# Patient Record
Sex: FEMALE | Race: BLACK OR AFRICAN AMERICAN | NOT HISPANIC OR LATINO | Employment: UNEMPLOYED | URBAN - NONMETROPOLITAN AREA
[De-identification: names, ages, dates, MRNs, and addresses within clinical notes are randomized per-mention and may not be internally consistent; named-entity substitution may affect disease eponyms.]

---

## 2019-03-28 DIAGNOSIS — M17.12 PRIMARY OSTEOARTHRITIS OF LEFT KNEE: Primary | ICD-10-CM

## 2019-11-06 PROBLEM — M17.12 PRIMARY OSTEOARTHRITIS OF LEFT KNEE: Status: ACTIVE | Noted: 2019-11-06

## 2024-01-16 ENCOUNTER — HOSPITAL ENCOUNTER (INPATIENT)
Facility: HOSPITAL | Age: 44
LOS: 10 days | Discharge: HOME | DRG: 561 | End: 2024-01-26
Attending: INTERNAL MEDICINE | Admitting: ORTHOPAEDIC SURGERY
Payer: MEDICAID

## 2024-01-16 DIAGNOSIS — Z96.659 S/P TOTAL KNEE ARTHROPLASTY: ICD-10-CM

## 2024-01-16 DIAGNOSIS — E66.9 OBESITY (BMI 30-39.9): Primary | ICD-10-CM

## 2024-01-16 DIAGNOSIS — S78.119A UNILATERAL AKA: ICD-10-CM

## 2024-01-16 DIAGNOSIS — M17.12 OSTEOARTHRITIS OF LEFT KNEE: ICD-10-CM

## 2024-01-16 DIAGNOSIS — M17.12 PRIMARY OSTEOARTHRITIS OF LEFT KNEE: ICD-10-CM

## 2024-01-16 PROCEDURE — 25000003 PHARM REV CODE 250: Performed by: STUDENT IN AN ORGANIZED HEALTH CARE EDUCATION/TRAINING PROGRAM

## 2024-01-16 PROCEDURE — 11800000 HC REHAB PRIVATE ROOM

## 2024-01-16 PROCEDURE — 97167 OT EVAL HIGH COMPLEX 60 MIN: CPT

## 2024-01-16 RX ORDER — GABAPENTIN 300 MG/1
300 CAPSULE ORAL 3 TIMES DAILY
Status: DISCONTINUED | OUTPATIENT
Start: 2024-01-16 | End: 2024-01-26 | Stop reason: HOSPADM

## 2024-01-16 RX ORDER — METHOCARBAMOL 750 MG/1
750 TABLET, FILM COATED ORAL 3 TIMES DAILY
Status: DISCONTINUED | OUTPATIENT
Start: 2024-01-16 | End: 2024-01-17

## 2024-01-16 RX ORDER — FAMOTIDINE 20 MG/1
20 TABLET, FILM COATED ORAL 2 TIMES DAILY
Status: DISCONTINUED | OUTPATIENT
Start: 2024-01-16 | End: 2024-01-26 | Stop reason: HOSPADM

## 2024-01-16 RX ORDER — BISACODYL 10 MG/1
10 SUPPOSITORY RECTAL DAILY PRN
Status: DISCONTINUED | OUTPATIENT
Start: 2024-01-16 | End: 2024-01-26 | Stop reason: HOSPADM

## 2024-01-16 RX ORDER — ACETAMINOPHEN 500 MG
1000 TABLET ORAL EVERY 8 HOURS
Status: DISCONTINUED | OUTPATIENT
Start: 2024-01-16 | End: 2024-01-17

## 2024-01-16 RX ORDER — POLYETHYLENE GLYCOL 3350 17 G/17G
17 POWDER, FOR SOLUTION ORAL DAILY
Status: DISCONTINUED | OUTPATIENT
Start: 2024-01-17 | End: 2024-01-26 | Stop reason: HOSPADM

## 2024-01-16 RX ORDER — SODIUM CHLORIDE 0.9 % (FLUSH) 0.9 %
10 SYRINGE (ML) INJECTION
Status: DISCONTINUED | OUTPATIENT
Start: 2024-01-16 | End: 2024-01-26 | Stop reason: HOSPADM

## 2024-01-16 RX ORDER — ONDANSETRON 4 MG/1
8 TABLET, ORALLY DISINTEGRATING ORAL EVERY 8 HOURS PRN
Status: DISCONTINUED | OUTPATIENT
Start: 2024-01-16 | End: 2024-01-26 | Stop reason: HOSPADM

## 2024-01-16 RX ORDER — ZIPRASIDONE HYDROCHLORIDE 40 MG/1
40 CAPSULE ORAL NIGHTLY
Status: DISCONTINUED | OUTPATIENT
Start: 2024-01-16 | End: 2024-01-26 | Stop reason: HOSPADM

## 2024-01-16 RX ORDER — IBUPROFEN 400 MG/1
800 TABLET ORAL EVERY 6 HOURS
Status: DISCONTINUED | OUTPATIENT
Start: 2024-01-16 | End: 2024-01-17

## 2024-01-16 RX ORDER — OXYCODONE HYDROCHLORIDE 10 MG/1
10 TABLET ORAL EVERY 4 HOURS PRN
Status: DISCONTINUED | OUTPATIENT
Start: 2024-01-16 | End: 2024-01-26 | Stop reason: HOSPADM

## 2024-01-16 RX ORDER — AMOXICILLIN 250 MG
1 CAPSULE ORAL 2 TIMES DAILY
Status: DISCONTINUED | OUTPATIENT
Start: 2024-01-16 | End: 2024-01-18

## 2024-01-16 RX ORDER — OXYCODONE HYDROCHLORIDE 5 MG/1
5 TABLET ORAL EVERY 4 HOURS PRN
Status: DISCONTINUED | OUTPATIENT
Start: 2024-01-16 | End: 2024-01-26 | Stop reason: HOSPADM

## 2024-01-16 RX ADMIN — IBUPROFEN 800 MG: 400 TABLET, FILM COATED ORAL at 11:01

## 2024-01-16 RX ADMIN — METHOCARBAMOL TABLETS 750 MG: 750 TABLET, COATED ORAL at 09:01

## 2024-01-16 RX ADMIN — GABAPENTIN 300 MG: 300 CAPSULE ORAL at 03:01

## 2024-01-16 RX ADMIN — ZIPRASIDONE HYDROCHLORIDE 40 MG: 40 CAPSULE ORAL at 09:01

## 2024-01-16 RX ADMIN — ACETAMINOPHEN 1000 MG: 500 TABLET ORAL at 03:01

## 2024-01-16 RX ADMIN — ACETAMINOPHEN 1000 MG: 500 TABLET ORAL at 09:01

## 2024-01-16 RX ADMIN — METHOCARBAMOL TABLETS 750 MG: 750 TABLET, COATED ORAL at 03:01

## 2024-01-16 RX ADMIN — IBUPROFEN 800 MG: 400 TABLET, FILM COATED ORAL at 05:01

## 2024-01-16 RX ADMIN — RIVAROXABAN 20 MG: 10 TABLET, FILM COATED ORAL at 05:01

## 2024-01-16 RX ADMIN — GABAPENTIN 300 MG: 300 CAPSULE ORAL at 09:01

## 2024-01-16 RX ADMIN — SENNOSIDES AND DOCUSATE SODIUM 1 TABLET: 8.6; 5 TABLET ORAL at 09:01

## 2024-01-16 RX ADMIN — FAMOTIDINE 20 MG: 20 TABLET, FILM COATED ORAL at 09:01

## 2024-01-16 NOTE — PLAN OF CARE
Problem: Rehabilitation (IRF) Plan of Care  Goal: Plan of Care Review  Outcome: Ongoing, Progressing  Goal: Patient-Specific Goal (Individualized)  Outcome: Ongoing, Progressing  Goal: Absence of New-Onset Illness or Injury  Outcome: Ongoing, Progressing  Goal: Optimal Comfort and Wellbeing  Outcome: Ongoing, Progressing  Goal: Readiness for Transition of Care  Outcome: Ongoing, Progressing     Problem: Bariatric Environmental Safety  Goal: Safety Maintained with Care  Outcome: Ongoing, Progressing

## 2024-01-16 NOTE — PLAN OF CARE
Problem: Occupational Therapy  Goal: Occupational Therapy Goal  Description:  Long Term Goals to be met by: 01/30/24     Patient will increase functional independence with ADLs by performing:    Feeding with [place].  UE Dressing with Modified [place].  LE Dressing with Supervision.  Grooming while seated at sink with Modified [place].  Toileting from toilet with Set-up Assistance for hygiene and clothing management.   Bathing from  shower chair/bench with Set-up Assistance.  Toilet transfer to toilet with Set-up Assistance.  Increased functional strength to 4+/5 to 5/5 for bilateral UE's.    Outcome: Established OT POC

## 2024-01-16 NOTE — PLAN OF CARE
[facility] (Hospital)  Initial Discharge Assessment       Primary Care Provider: No, Primary Doctor    Admission Diagnosis: Unilateral AKA [S78.119A]  S/P total knee arthroplasty [Z96.659]  Osteoarthritis of left knee [M17.12]    Admission Date: 1/16/2024  Expected Discharge Date:     Transition of Care Barriers: Mobility    Payor: MEDICAID / Plan: [employer] [facility] ([employer]) / Product Type: Managed Medicaid /     Extended Emergency Contact Information  Primary Emergency Contact: Liliane Santizo  Address:             [place] [address] [facility]  Home Phone: [phone]  Mobile Phone: [phone]  Relation: Mother    Discharge Plan A: Home with family  Discharge Plan B: Home with family      [facility] [place] [address]  [address]  [place] [address]  Phone: [phone] Fax: [phone]      Initial Assessment (most recent)       Adult Discharge Assessment - 01/16/24 1505          Discharge Assessment    Assessment Type Discharge Planning Assessment     Confirmed/corrected address, phone number and insurance Yes     Confirmed Demographics Correct on Facesheet     Source of Information patient     If unable to respond/provide information was family/caregiver contacted? Yes     Contact Name/Number Liliane Santizo [phone]     Communicated FRED with patient/caregiver Yes     Reason For Admission IPR     People in Home parent(s)     Facility Arrived From: [facility]     Do you expect to return to your current living situation? Yes     Do you have help at home or someone to help you manage your care at home? Yes     Who are your caregiver(s) and their phone number(s)? Liliane Santizo [phone]     Prior to hospitilization cognitive status: Alert/Oriented   Patient is deaf and difficult w/communication    Current cognitive status: Alert/Oriented     Walking or Climbing Stairs Difficulty yes     Walking  or Climbing Stairs ambulation difficulty, requires equipment     Mobility Management Requires rollator or WC     Dressing/Bathing Difficulty yes     Dressing/Bathing bathing difficulty, assistance 1 person     Dressing/Bathing Management Mother assists w/getting in and out of shower     Home Accessibility stairs to enter home     Number of Stairs, Main Entrance three     Home Layout Able to live on 1st floor     Equipment Currently Used at Home wheelchair;rollator     Readmission within 30 days? No     Patient currently being followed by outpatient case management? No     Do you currently have service(s) that help you manage your care at home? No     Do you take prescription medications? Yes     Do you have prescription coverage? Yes     Coverage [email or web address]     Do you have any problems affording any of your prescribed medications? No     Is the patient taking medications as prescribed? yes     Who is going to help you get home at discharge? Liliane Santizo [phone]     How do you get to doctors appointments? family or friend will provide     Are you on dialysis? No     Do you take coumadin? No     Discharge Plan A Home with family     Discharge Plan B Home with family     DME Needed Upon Discharge  other (see comments)   TBD    Discharge Plan discussed with: Parent(s)     Name(s) and Number(s) Liliane Santizo [phone]     Transition of Care Barriers Mobility        Physical Activity    On average, how many days per week do you engage in moderate to strenuous exercise (like a brisk walk)? 0 days     On average, how many minutes do you engage in exercise at this level? 0 min        Financial Resource Strain    How hard is it for you to pay for the very basics like food, housing, medical care, and heating? Not hard at all        Housing Stability    In the last 12 months, was there a time when you were not able to pay the mortgage or rent on time? No     In the last 12 months, was there a time when you  did not have a steady place to sleep or slept in a shelter (including now)? No        Transportation Needs    In the past 12 months, has lack of transportation kept you from medical appointments or from getting medications? No     In the past 12 months, has lack of transportation kept you from meetings, work, or from getting things needed for daily living? No        Food Insecurity    Within the past 12 months, you worried that your food would run out before you got the money to buy more. Never true     Within the past 12 months, the food you bought just didn't last and you didn't have money to get more. Never true        Stress    Do you feel stress - tense, restless, nervous, or anxious, or unable to sleep at night because your mind is troubled all the time - these days? Only a little        Social Connections    In a typical week, how many times do you talk on the phone with family, friends, or neighbors? More than three times a week     How often do you get together with friends or relatives? More than three times a week     Do you belong to any clubs or organizations such as Sikhism groups, unions, fraternal or athletic groups, or school groups? No     Are you , , , , never , or living with a partner? Never         Alcohol Use    Q1: How often do you have a drink containing alcohol? Never     Q2: How many drinks containing alcohol do you have on a typical day when you are drinking? Patient does not drink     Q3: How often do you have six or more drinks on one occasion? Never        OTHER    Name(s) of People in Home Liliane Santizo [phone]                   DCP completed. No concerns noted at this time. CM to remain available for any needs.

## 2024-01-16 NOTE — PT/OT/SLP EVAL
Occupational Therapy Inpatient Rehab Evaluation    Name: Lupe Santizo  MRN: [id number]    Recommendations:     Discharge Recommendations:  Low Intensity Therapy   Discharge Equipment Recommendations: walker, rolling, bedside commode, bath bench   Barriers to discharge:  Medical and functional status    Assessment:  Lupe Santizo is a 43 y.o. female admitted with a medical diagnosis of <principal problem not specified>.  She presents with the following impairments/functional limitations:  weakness, impaired endurance, impaired self care skills, impaired functional mobility, gait instability, impaired balance, visual deficits, impaired cognition, decreased coordination, decreased upper extremity function, decreased lower extremity function, decreased safety awareness, pain, decreased ROM, impaired fine motor, impaired skin, edema, impaired cardiopulmonary response to activity, impaired joint extensibility, impaired muscle length .    General Precautions: Standard, fall, deaf (Communicates utilizing board)     Orthopedic Precautions:LLE weight bearing as tolerated     Braces: N/A    Rehab Prognosis: Good; patient would benefit from acute skilled OT services to address these deficits and reach maximum level of function.      History:     Past Medical History:   Diagnosis Date    Deaf     History of DVT (deep vein thrombosis)     Primary osteoarthritis of left knee        Past Surgical History:   Procedure Laterality Date    CHOLECYSTECTOMY      KNEE SURGERY      TOTAL KNEE ARTHROPLASTY Left 1/11/2024    Procedure: ARTHROPLASTY, KNEE, TOTAL;  Surgeon: Phoenix Godoy MD;  Location: [facility];  Service: Orthopedics;  Laterality: Left;       Subjective     Orientation: Oriented x4    Chief Complaint: weakness and pain    Patient/Family Comments/goals: Pt would like to improve her overall independence with ADL's including functional mobility.     Vitals  Vitals at Rest  BP     HR     O2 Sat     Pain Pain Rating 1:  10/10  Location - Side 1: Left  Location 1: knee  Pain Addressed 1: Reposition, Distraction, Cessation of Activity, Nurse notified  Pain Rating Post-Intervention 1: 6/10     Vitals With Activity  BP     HR     O2 Sat     Pain Pain Rating 1: 10/10  Location - Side 1: Left  Location 1: knee  Pain Addressed 1: Reposition, Distraction, Cessation of Activity, Nurse notified  Pain Rating Post-Intervention 1: 6/10     Respiratory Status: Room air    Patients cultural, spiritual, Druze conflicts given the current situation: no       Living Environment   Living Environment  People in Home: parent(s) (Pt lives with her mom.)  Name(s) of People in Home: Liliane Santizo [phone]  Living Arrangements: house  Home Accessibility: stairs to enter home (3 steps)  Number of Stairs, Main Entrance: three  Stair Railings at Home: none  Home Layout: Able to live on 1st floor  Transportation Anticipated: family or friend will provide  Equipment Currently Used at Home: wheelchair, rollator  Shower Setup: Tub/Shower combo    Prior Level of Function  BADL: Supervision or Set-up Assistance    IADL: Activity did not occur    Equipment used at home: wheelchair, rollator.  DME owned (not currently used): none.      Upon discharge, patient will have assistance from mother.    Objective:     Patient found HOB elevated with    upon OT entry to room.    Mobility   Patient completed:  Rolling/Turning to Left with contact guard assistance  Rolling/Turning to Right with contact guard assistance  Supine to Sit with contact guard assistance  Sit to Stand Transfer with moderate assistance with rolling walker  Bed to Chair Transfer using Step Transfer technique with moderate assistance with rolling walker  Toilet Transfer Step Transfer technique with moderate assistance with  grab bars  Shower Transfer Step Transfer technique with moderate assistance with grab bars and TTB.    Functional Mobility   Pt ambulated 17' between surfaces requiring  steadying assist utilizing RW.     ADLs     Current Status   Eating 5   Oral Hygiene 4   Shower, Bathe Self 2   Upper Body Dressing 3   Lower Body Dressing 2   Toileting Hygiene 2   Toilet Transfer 3   Putting On, Taking Off Footwear 1     Limiting Factors for ADLs: endurance, limited ROM, balance, weakness, body habitus, coordination, cognition, safety awareness, and pain    Exams     ROM: -       WFL    Hand Dominance: Left    ROM Hand  Left Hand: WFL  Right Hand: WFL    Strength  Overall Strength: -       Deficits, Left Upper    Strength Comment   Shoulder Flexion  4-/5    Shoulder Extension 4-/5    Shoulder Abduction  4-/5    Shoulder Adduction 4/5    Shoulder External Rotation 4-/5    Shoulder Internal Rotation 4-/5    Elbow Flexion 4/5    Elbow Extension  4/5    Forearm Pronation 4/5    Forearm Supination 4/5    Wrist Flexion 4/5    Wrist Extension 4/5    MP Flexion     MP Extension     Thumb Flexion     Thumb Extension       -       Deficits, Right Upper    Strength Comment   Shoulder Flexion  4-/5    Shoulder Extension 4-/5    Shoulder Abduction  4-/5    Shoulder Adduction 4/5    Shoulder External Rotation 4-/5    Shoulder Internal Rotation 4-/5    Elbow Flexion 4/5    Elbow Extension  4/5    Forearm Pronation 4/5    Forearm Supination 4/5    Wrist Flexion 4/5    Wrist Extension 4/5    MP Flexion     MP Extension     Thumb Flexion     Thumb Extension          Strength:   Left   Left  Strength Trial 1 Trial 2 Trail 3    Strength 45 38 38       Right   Right  Strength Trial 1 Trial 2 Trail 3    Strength 38 36 37       Sensation  Left: -       WNL  Right: -       WNL    Coordination:   -       Impaired  Right hand, finger to nose  , Right hand, manipulation of objects  , and Fine motor coordination tests 9-Hole Peg Test    Tone  Left: WNL  Right: WNL    Visual/Perceptual  Impaired  acuity    Cognition:   Deficits, STM    Balance    Sitting  Sitting Surface: EOB Unsupported  Static: No UE  Support, Good (-)  Dynamic: No UE extremity support, Fair (+)    Standing  Static: Bilateral UE Extremity Support, Fair (-)  Dynamic: Bilateral UE extremity support, Poor (+)    Righting Reaction:   Deficits     Comment   Left Protective Reactions Present    Right Protective Reactions Present    Forward Protective Reactions Present    Backward Protective Reactions Present    Left Rightign Reactions Delayed    Right Righting Reactions Delayed    Forward Righting Reactions Delayed    Backward Righting Reactions Delayed        Posture/Deviations  Rounded Shoulders    Additional Treatments   9-Hole Peg Test: (L) 34 seconds; (R) 48 seconds    LifeStyle Change and Education     Patient left up in chair with  nurse notified.    Education provided: Roles and goals of OT, ADLs, transfer training, bed mobility, body mechanics, assistive device, wheelchair precautions, safety precautions, fall prevention, post-op precautions, equipment recommendations, and home safety    Short Term Goals to be met by: 01/23/24     Patient will increase functional independence with ADLs by performing:    UE Dressing with Supervision.  LE Dressing with Moderate Assistance.  Grooming while seated at sink with Set-up Assistance.  Toileting from toilet with Minimal Assistance for hygiene and clothing management.   Bathing from  shower chair/bench with Minimal Assistance.  Toilet transfer to toilet with Minimal Assistance.  Increased functional strength to 4/5 for bilateral UE's.     Long Term Goals to be met by: 01/30/24     Patient will increase functional independence with ADLs by performing:    Feeding with [place].  UE Dressing with Modified [place].  LE Dressing with Supervision.  Grooming while seated at sink with Modified [place].  Toileting from toilet with Set-up Assistance for hygiene and clothing management.   Bathing from  shower chair/bench with Set-up Assistance.  Toilet transfer to toilet with Set-up  Assistance.  Increased functional strength to 4+/5 to 5/5 for bilateral UE's.      Plan     During this hospitalization, patient to be seen 5 x/week (for 90 min per day, for 14 days) to address the identified rehab impairments via self-care/home management, community/work re-entry, therapeutic activities, therapeutic exercises, cognitive retraining, wheelchair management/training and progress toward the following goals:    Plan of Care Expires:  01/30/24    Time Tracking     OT Received On: 01/16/24  Time In 1400     Time Out 1535  Total Time 95 min  Therapy Time: OT Individual: 95  Missed Time:    Missed Time Reason:      Billable Minutes: Evaluation 95    01/16/2024

## 2024-01-17 PROBLEM — E66.9 OBESITY (BMI 30-39.9): Status: ACTIVE | Noted: 2024-01-17

## 2024-01-17 PROBLEM — Z86.718 HISTORY OF DVT (DEEP VEIN THROMBOSIS): Status: ACTIVE | Noted: 2024-01-17

## 2024-01-17 PROBLEM — H91.93 BILATERAL DEAFNESS: Status: ACTIVE | Noted: 2024-01-17

## 2024-01-17 PROBLEM — I10 ESSENTIAL HYPERTENSION: Status: ACTIVE | Noted: 2021-01-16

## 2024-01-17 PROBLEM — F81.0: Status: ACTIVE | Noted: 2024-01-17

## 2024-01-17 PROBLEM — Z96.652 STATUS POST TOTAL LEFT KNEE REPLACEMENT: Status: ACTIVE | Noted: 2024-01-17

## 2024-01-17 PROCEDURE — 92523 SPEECH SOUND LANG COMPREHEN: CPT

## 2024-01-17 PROCEDURE — 97162 PT EVAL MOD COMPLEX 30 MIN: CPT

## 2024-01-17 PROCEDURE — 97530 THERAPEUTIC ACTIVITIES: CPT

## 2024-01-17 PROCEDURE — 11800000 HC REHAB PRIVATE ROOM

## 2024-01-17 PROCEDURE — 97110 THERAPEUTIC EXERCISES: CPT

## 2024-01-17 PROCEDURE — 25000003 PHARM REV CODE 250: Performed by: STUDENT IN AN ORGANIZED HEALTH CARE EDUCATION/TRAINING PROGRAM

## 2024-01-17 PROCEDURE — 97535 SELF CARE MNGMENT TRAINING: CPT

## 2024-01-17 PROCEDURE — 97116 GAIT TRAINING THERAPY: CPT

## 2024-01-17 RX ORDER — METHOCARBAMOL 750 MG/1
750 TABLET, FILM COATED ORAL 3 TIMES DAILY PRN
Status: DISCONTINUED | OUTPATIENT
Start: 2024-01-17 | End: 2024-01-26 | Stop reason: HOSPADM

## 2024-01-17 RX ORDER — ACETAMINOPHEN 325 MG/1
650 TABLET ORAL EVERY 6 HOURS PRN
Status: DISCONTINUED | OUTPATIENT
Start: 2024-01-17 | End: 2024-01-26 | Stop reason: HOSPADM

## 2024-01-17 RX ORDER — IBUPROFEN 600 MG/1
600 TABLET ORAL EVERY 6 HOURS PRN
Status: DISCONTINUED | OUTPATIENT
Start: 2024-01-17 | End: 2024-01-26 | Stop reason: HOSPADM

## 2024-01-17 RX ADMIN — POLYETHYLENE GLYCOL (3350) 17 G: 17 POWDER, FOR SOLUTION ORAL at 08:01

## 2024-01-17 RX ADMIN — GABAPENTIN 300 MG: 300 CAPSULE ORAL at 08:01

## 2024-01-17 RX ADMIN — SENNOSIDES AND DOCUSATE SODIUM 1 TABLET: 8.6; 5 TABLET ORAL at 08:01

## 2024-01-17 RX ADMIN — FAMOTIDINE 20 MG: 20 TABLET, FILM COATED ORAL at 08:01

## 2024-01-17 RX ADMIN — IBUPROFEN 800 MG: 400 TABLET, FILM COATED ORAL at 05:01

## 2024-01-17 RX ADMIN — OXYCODONE HYDROCHLORIDE 5 MG: 5 TABLET ORAL at 01:01

## 2024-01-17 RX ADMIN — GABAPENTIN 300 MG: 300 CAPSULE ORAL at 02:01

## 2024-01-17 RX ADMIN — ZIPRASIDONE HYDROCHLORIDE 40 MG: 40 CAPSULE ORAL at 08:01

## 2024-01-17 RX ADMIN — METHOCARBAMOL TABLETS 750 MG: 750 TABLET, COATED ORAL at 08:01

## 2024-01-17 RX ADMIN — RIVAROXABAN 20 MG: 10 TABLET, FILM COATED ORAL at 05:01

## 2024-01-17 RX ADMIN — ACETAMINOPHEN 1000 MG: 500 TABLET ORAL at 05:01

## 2024-01-17 NOTE — NURSING
Called patient's mother twice to let her know that her daughter needed some clothes for therapy. No one answered and was unable to leave message.

## 2024-01-17 NOTE — PLAN OF CARE
Recommendations  1. Rec'd regular diet.   2. RD to follow and make rec's accordingly.  Goals:   1. Pt will continue to consume > 75% of meals by next RD follow up.  Nutrition Goal Status: new

## 2024-01-17 NOTE — PLAN OF CARE
New patient, hearing impaired, communicates with dry erase board and some signing, afraid of the dark and does not want the lights out, no needs at this time, call bell in reach.

## 2024-01-17 NOTE — ASSESSMENT & PLAN NOTE
Patient's pain seems to be fairly well controlled postoperatively agree with current regimen and adjust as needed.

## 2024-01-17 NOTE — PLAN OF CARE
Problem: Physical Therapy  Goal: Physical Therapy Goal  Description:   Short Term Goals: 2024   Long Term Goals: 2024     Transfers Status    Sit to Stand  Short Term Goal: Complete sit to stand with Stand-by Assistance using Rolling Walker with minimal  verbal cues  Long Term Goal:  Complete sit to stand with Supervision or Set-up Assistance using Rolling Walker     Stand Step  Short Term Goal: Complete stand step with  Stand-by Assistance  using Rolling Walker with minimal   verbal cues  Long Term Goal:  Complete stand step with  Supervision or Set-up Assistance  using Rolling Walker     Supine <> Sit    Long Term Goal: Complete supine <> sit with Supervision or Set-up Assistance      Rolling Right/Left  Long Term Goal: Complete rolling  right/left with Supervision or Set-up Assistance     Balance/Coordination    Static Sitting  Long Term Goal: Complete static sitting with Modified Independent      Dynamic Sitting  Long Term Goal: Complete dynamic sitting with Supervision or Set-up Assistance  with  minimal  verbal cues minimal excursions    Static Standing  Short Term Goal: Complete static standing with  Stand-by Assistance with  minimal  verbal cues and RW  Long Term Goal: Complete static standing with Supervision or Set-up Assistance  with   minimal   verbal cues and RW    Dynamic Standing  Short Term Goal: Complete dynamic standing with Stand-by Assistance  with minimal   verbal cues minimal  excursions with RW  Long Term Goal: Complete dynamic standing with Supervision or Set-up Assistance with minimal   verbal cues and minimal  excursions with RW    Endurance    Short Term Goal:   Physical Therapy: 2 times a day, 30-45 minutes  Rest periods: multiple  Sitting: 3 hours/day    Long Term Goal:  Physical Therapy: 2 times a day, 45 minutes  Rest periods: minimal  Sittin-8 hours/day    Mobility/Gait  Short Term Goal: Will ambulate with Stand-by Assistance  Using Rolling Walker with minimal   verbal  cues x 100 ft  Long Term Goal: Will ambulate with Stand-by Assistance  using Rolling Walker with minimal  verbal cues x 150 ft    Stairs Assistance  Long Term Goal: Patient will ascend/descend 4 stairs/steps using both  handrails  nonreciprocal steps with Minimal Assistance and minimal  verbal cues    Wheelchair Skills/Surface  Long Term Goal: Patient will propel Standard wheelchair x 300 feet with Level tileWITH Bilateral upper extremity with Modified Independent       Ramp/Uneven 10 feet surface  Long  Term Goal; Patient will be able to navigate a 10 inch uneven surface with proper A.D. with  contact guard assistance/stand by assistance       2-6  inch curb  Long  Term Goal; Patient will be able to navigate a  2  inch curb with RW with  minimal assistance     Picking up object   Long  Term Goal; Patient will be able to  a small object from the floor  with a RW . with  stand by assistance     Car transfers  Long  Term Goal; Patient will be able to get in and out of a vehicle  with RW  with  minimal assistance     Education  Long Term Goals:  Patient/family/caregivers trained on physical mobility and precautions with Supervision.    Patient/family/caregivers trained on safety awareness with Supervision.    Order and obtain all appropriate equipment.     Outcome: Plan of care established

## 2024-01-17 NOTE — PT/OT/SLP PROGRESS
Occupational Therapy Inpatient Rehab Treatment    Name: Lupe Santizo  MRN: [id number]    Assessment:  Lupe Santizo is a 43 y.o. female admitted with a medical diagnosis of Status post total left knee replacement.  She presents with the following impairments/functional limitations:  weakness, impaired endurance, impaired self care skills, impaired functional mobility, gait instability, impaired balance, visual deficits, impaired cognition, decreased coordination, decreased upper extremity function, decreased lower extremity function, decreased safety awareness, pain, decreased ROM, impaired fine motor, impaired skin, edema, impaired cardiopulmonary response to activity, impaired joint extensibility, impaired muscle length.    Pt demonstrated improved functional performance with LB dressing as noted by max assist with use of adaptive equipment including reacher and sock aide in which patient able to thread (R) LE into pants and jared some of socks utilizing socks aide with cueing, repetition, and demonstration.     General Precautions: Standard, fall, deaf     Orthopedic Precautions:LLE weight bearing as tolerated     Braces: N/A    Rehab Prognosis: Good; patient would benefit from acute skilled OT services to address these deficits and reach maximum level of function.      History:     Past Medical History:   Diagnosis Date    Deaf     History of DVT (deep vein thrombosis)     Primary osteoarthritis of left knee        Past Surgical History:   Procedure Laterality Date    CHOLECYSTECTOMY      KNEE SURGERY      TOTAL KNEE ARTHROPLASTY Left 1/11/2024    Procedure: ARTHROPLASTY, KNEE, TOTAL;  Surgeon: Phoenix Godoy MD;  Location: [facility];  Service: Orthopedics;  Laterality: Left;       Subjective     Orientation: Oriented x4    Chief Complaint: weakness and pain     Patient/Family Comments/goals: Pt would like to improve her overall independence with ADL's including functional mobility.      Respiratory Status:  Room air    Patients cultural, spiritual, Denominational conflicts given the current situation: no       Objective:     Patient found HOB elevated with    upon OT entry to room.    Mobility   Patient completed:  Rolling/Turning to Right with stand by assistance  Supine to Sit with stand by assistance  Sit to Stand Transfer with moderate assistance with rolling walker  Bed to Chair Transfer using Step Transfer technique with moderate assistance with rolling walker  Toilet Transfer Step Transfer technique with moderate assistance with  grab bars    ADLs   Current Status   Eating     Oral Hygiene     Shower, Bathe Self     Upper Body Dressing 3   Lower Body Dressing 2   Toileting Hygiene     Toilet Transfer     Putting On, Taking Off Footwear 2     Limiting Factors for ADLs: endurance, limited ROM, balance, weakness, body habitus, coordination, cognition, safety awareness, and pain     Therapeutic Activities  Pt participated in functional transfer retraining to / from bed, wheelchair, and chair emphasizing fall prevention providing extra time requiring mod assist secondary to lifting assist and steadying with additional tactile cueing for safety awareness and technique. Also, she participated in therapeutic activity addressing trunk mobility, trunk control, dynamic sitting balance, and trunk strength utilizing large stability ball challenging her to perform trunk flexion, extension, and lateral flexion requiring verbal and tactile cueing to facilitate optimal movement patterns and increased range per trial in order to improve active ROM impacting performance with functional tasks below waist.    Therapeutic Exercise  Pt participated in therapeutic exercise performing 5x12 seated pushups requiring verbal and tactile cueing for technique challenging him to lift buttocks off seated surface to end range elbow extension in order to strengthen triceps brachii to improve performance with sit <> stand transitions particularly from  lower surfaces.       Additional Treatments: Pt participated in ADL retraining as further noted above emphasizing use of adaptive equipment, assistive devices, and compensatory strategies providing much extra time with demonstration requiring verbal and tactile cueing for safety awareness and technique.     LifeStyle Change and Education:             Patient left up in chair with  nurse notified.     Education provided: Roles and goals of OT, ADLs, transfer training, bed mobility, body mechanics, assistive device, wheelchair precautions, safety precautions, fall prevention, post-op precautions, equipment recommendations, and home safety    Multidisciplinary Problems       Occupational Therapy Goals          Problem: Occupational Therapy    Goal Priority Disciplines Outcome Interventions   Occupational Therapy Goal     OT, PT/OT Ongoing, Progressing    Description:  Long Term Goals to be met by: 01/30/24     Patient will increase functional independence with ADLs by performing:    Feeding with [place].  UE Dressing with Modified [place].  LE Dressing with Supervision.  Grooming while seated at sink with Modified [place].  Toileting from toilet with Set-up Assistance for hygiene and clothing management.   Bathing from  shower chair/bench with Set-up Assistance.  Toilet transfer to toilet with Set-up Assistance.  Increased functional strength to 4+/5 to 5/5 for bilateral UE's.                         Time Tracking     OT Received On: 01/17/24  Time In 0930     Time Out 1100  Total Time 90 min  Therapy Time: OT Individual: 60  OT Concurrent: 30  Missed Time:    Missed Time Reason:      Billable Minutes: Self Care/Home Management 30, Therapeutic Activity 30, and Therapeutic Exercise 30    01/17/2024

## 2024-01-17 NOTE — PT/OT/SLP PROGRESS
Physical Therapy Inpatient Rehab Treatment    Patient Name:  Lupe Santizo   MRN:  [id number]    Recommendations:     Discharge Recommendations:  Low Intensity Therapy   Discharge Equipment Recommendations: walker, rolling, bedside commode, bath bench   Barriers to discharge: None    Assessment:     Lupe Santizo is a 43 y.o. female admitted with a medical diagnosis of Status post total left knee replacement.  She presents with the following impairments/functional limitations:      weakness, pain, cognitive deficits, impaired coordination, impaired balance, gait deviations, difficulty with functional transfers, difficulty with bed mobility, difficulty with wheelchair propulsion, impaired sensation, impaired proprioception, decreased motor control, decreased safety, and decreased endurance. .    Patient participated in gait training with multiple rest breaks in between.  C/o fatigue and pain.    Rehab Diagnosis:   Left TKA, WBAT     Recent Surgery: * No surgery found *      General Precautions: Standard, deaf, fall     Orthopedic Precautions:LLE weight bearing as tolerated     Braces: N/A    Rehab Prognosis: Fair; patient would benefit from acute skilled PT services to address these deficits and reach maximum level of function.      History:     Past Medical History:   Diagnosis Date    Deaf     History of DVT (deep vein thrombosis)     Primary osteoarthritis of left knee        Past Surgical History:   Procedure Laterality Date    CHOLECYSTECTOMY      KNEE SURGERY      TOTAL KNEE ARTHROPLASTY Left 1/11/2024    Procedure: ARTHROPLASTY, KNEE, TOTAL;  Surgeon: Phoenix Godoy MD;  Location: [facility];  Service: Orthopedics;  Laterality: Left;       Subjective     Chief Complaint: Knee pain     Respiratory Status: Room air    Patients cultural, spiritual, Denominational conflicts given the current situation: no      Objective:     Communicated with nurse and patient  prior to session.  Patient found up in chair with     upon PT entry to room.    Pt is Oriented x3, Oriented to place, and Oriented to situation and Alert, Cooperative, and Motivated.      Pain Left knee pain        Functional Mobility:   Transfers:     Sit to Stand: Supervision or touching assistance  with rolling walker  Gait: Pt ambulates 5 feet, 15 feet, 30 feet, 50 feet and 25 feet  with RW with Partial/moderate assistance .      Current   Status  Discharge   Goal   Functional Area: Care Score:    Roll Left and Right 4 Set-up/clean-up   Sit to Lying 4 Set-up/clean-up   Lying to Sitting on Side of Bed 4 Set-up/clean-up   Sit to Stand 4 Set-up/clean-up   Chair/Bed-to-Chair Transfer 4 Set-up/clean-up   Car Transfer 10 Supervision or touching assistance   Walk 10 Feet 3 Supervision or touching assistance   Walk 50 Feet with Two Turns 88 Supervision or touching assistance   Walk 150 Feet 88 Supervision or touching assistance   Walk 10 Feet Uneven Surface 88 Supervision or touching assistance   1 Step (Curb) 88 Supervision or touching assistance   4 Steps 88 Supervision or touching assistance   12 Steps 88 Not applicable   Picking Up Object 4 Set-up/clean-up   Wheel 50 Feet with Two Turns 5 Independent   Wheel 150 Feet 5 Independent       Therapeutic Activities and Exercises:  Sit <> stand with RW  Gait training with RW  Standing tolerance     Activity Tolerance: Fair    Patient left up in chair with  nurse  notified and ST  present.    Education provided: roles and goals of PT/PTA, transfer training, bed mob, gait training, balance training, safety awareness, assistive device, wheelchair management, strengthening exercises, and fall prevention    Expected compliance: Moderate compliance    GOALS:   Multidisciplinary Problems       Physical Therapy Goals          Problem: Physical Therapy    Goal Priority Disciplines Outcome Goal Variances Interventions   Physical Therapy Goal     PT, PT/OT Ongoing, Progressing     Description:   Short Term Goals: 1/24/2024   Long Term  Goals: 2024     Transfers Status    Sit to Stand  Short Term Goal: Complete sit to stand with Stand-by Assistance using Rolling Walker with minimal  verbal cues  Long Term Goal:  Complete sit to stand with Supervision or Set-up Assistance using Rolling Walker     Stand Step  Short Term Goal: Complete stand step with  Stand-by Assistance  using Rolling Walker with minimal   verbal cues  Long Term Goal:  Complete stand step with  Supervision or Set-up Assistance  using Rolling Walker     Supine <> Sit    Long Term Goal: Complete supine <> sit with Supervision or Set-up Assistance      Rolling Right/Left  Long Term Goal: Complete rolling  right/left with Supervision or Set-up Assistance     Balance/Coordination    Static Sitting  Long Term Goal: Complete static sitting with Modified Independent      Dynamic Sitting  Long Term Goal: Complete dynamic sitting with Supervision or Set-up Assistance  with  minimal  verbal cues minimal excursions    Static Standing  Short Term Goal: Complete static standing with  Stand-by Assistance with  minimal  verbal cues and RW  Long Term Goal: Complete static standing with Supervision or Set-up Assistance  with   minimal   verbal cues and RW    Dynamic Standing  Short Term Goal: Complete dynamic standing with Stand-by Assistance  with minimal   verbal cues minimal  excursions with RW  Long Term Goal: Complete dynamic standing with Supervision or Set-up Assistance with minimal   verbal cues and minimal  excursions with RW    Endurance    Short Term Goal:   Physical Therapy: 2 times a day, 30-45 minutes  Rest periods: multiple  Sitting: 3 hours/day    Long Term Goal:  Physical Therapy: 2 times a day, 45 minutes  Rest periods: minimal  Sittin-8 hours/day    Mobility/Gait  Short Term Goal: Will ambulate with Stand-by Assistance  Using Rolling Walker with minimal   verbal cues x 100 ft  Long Term Goal: Will ambulate with Stand-by Assistance  using Rolling Walker with minimal  verbal  cues x 150 ft    Stairs Assistance  Long Term Goal: Patient will ascend/descend 4 stairs/steps using both  handrails  nonreciprocal steps with Minimal Assistance and minimal  verbal cues    Wheelchair Skills/Surface  Long Term Goal: Patient will propel Standard wheelchair x 300 feet with Level tileWITH Bilateral upper extremity with Modified Independent       Ramp/Uneven 10 feet surface  Long  Term Goal; Patient will be able to navigate a 10 inch uneven surface with proper A.D. with  contact guard assistance/stand by assistance       2-6  inch curb  Long  Term Goal; Patient will be able to navigate a  2  inch curb with RW with  minimal assistance     Picking up object   Long  Term Goal; Patient will be able to  a small object from the floor  with a RW . with  stand by assistance     Car transfers  Long  Term Goal; Patient will be able to get in and out of a vehicle  with RW  with  minimal assistance     Education  Long Term Goals:  Patient/family/caregivers trained on physical mobility and precautions with Supervision.    Patient/family/caregivers trained on safety awareness with Supervision.    Order and obtain all appropriate equipment.                          Plan:     During this hospitalization, patient to be seen 5 x/week to address the identified rehab impairments via gait training, therapeutic activities, therapeutic exercises, neuromuscular re-education, wheelchair management/training and progress toward the following goals:    Plan of Care Expires:  01/31/24  PT Next Visit Date: 01/18/24  Plan of Care reviewed with: patient    Additional Information:         Time Tracking:     Therapy Time  PT Received On: 01/17/24  PT Start Time: 1315  PT Stop Time: 1345  PT Total Time (min): 30 min   PT Individual: 30  PT Concurrent: 30      Billable Minutes: Gait Training 30 01/17/2024

## 2024-01-17 NOTE — SUBJECTIVE & OBJECTIVE
Past Medical History:   Diagnosis Date    Deaf     History of DVT (deep vein thrombosis)     Primary osteoarthritis of left knee        Past Surgical History:   Procedure Laterality Date    CHOLECYSTECTOMY      KNEE SURGERY      TOTAL KNEE ARTHROPLASTY Left 2024    Procedure: ARTHROPLASTY, KNEE, TOTAL;  Surgeon: Phoenix Godoy MD;  Location: [facility];  Service: Orthopedics;  Laterality: Left;       Review of patient's allergies indicates:  No Known Allergies    Current Facility-Administered Medications on File Prior to Encounter   Medication    methylPREDNISolone acetate injection 40 mg    [] mupirocin 2 % ointment 1 g    [DISCONTINUED] acetaminophen tablet 1,000 mg    [DISCONTINUED] bisacodyL suppository 10 mg    [DISCONTINUED] famotidine tablet 20 mg    [DISCONTINUED] gabapentin capsule 300 mg    [DISCONTINUED] ibuprofen tablet 800 mg    [DISCONTINUED] methocarbamoL tablet 750 mg    [DISCONTINUED] metoclopramide injection 5 mg    [DISCONTINUED] morphine injection 2 mg    [DISCONTINUED] ondansetron disintegrating tablet 8 mg    [DISCONTINUED] oxyCODONE immediate release tablet 5 mg    [DISCONTINUED] oxyCODONE immediate release tablet Tab 10 mg    [DISCONTINUED] polyethylene glycol packet 17 g    [DISCONTINUED] rivaroxaban tablet 20 mg    [DISCONTINUED] senna-docusate 8.6-50 mg per tablet 1 tablet    [DISCONTINUED] sodium chloride 0.9% flush 10 mL    [DISCONTINUED] ziprasidone capsule 40 mg     Current Outpatient Medications on File Prior to Encounter   Medication Sig    acetaminophen (TYLENOL) 500 MG tablet Take 2 tablets (1,000 mg total) by mouth every 8 (eight) hours as needed for Pain.    diclofenac (VOLTAREN) 75 MG EC tablet Take 1 tablet (75 mg total) by mouth 2 (two) times daily.    gabapentin (NEURONTIN) 300 MG capsule Take 1 capsule (300 mg total) by mouth 3 (three) times daily.    methocarbamoL (ROBAXIN) 750 MG Tab Take 1 tablet (750 mg total) by mouth 3 (three) times daily as needed (Pain not  relieved with Tylenol, diclofenac, or gabapentin).    oxyCODONE (ROXICODONE) 5 MG immediate release tablet Take 1 tablet (5 mg total) by mouth every 4 (four) hours as needed for Pain (pain not relieved by all other medications).    rivaroxaban (XARELTO) 20 mg Tab Take 20 mg by mouth.    ziprasidone (GEODON) 40 MG Cap TAKE 1 CAPSULE BY MOUTH DAILY AT BEDTIME TAKE WITH FOOD     Family History    None       Tobacco Use    Smoking status: Never    Smokeless tobacco: Current   Substance and Sexual Activity    Alcohol use: Never    Drug use: Never    Sexual activity: Not Currently     Review of Systems   Unable to perform ROS: Patient nonverbal     Objective:     Vital Signs (Most Recent):  Temp: 97.8 °F (36.6 °C) (01/17/24 0427)  Pulse: 71 (01/17/24 0427)  Resp: 20 (01/17/24 0826)  BP: (!) 109/58 (01/17/24 0427)  SpO2: 97 % (01/17/24 0427) Vital Signs (24h Range):  Temp:  [96.7 °F (35.9 °C)-97.8 °F (36.6 °C)] 97.8 °F (36.6 °C)  Pulse:  [71-78] 71  Resp:  [18-20] 20  SpO2:  [97 %] 97 %  BP: (107-115)/(50-75) 109/58     Weight: 94.4 kg (208 lb 3.2 oz)  Body mass index is 35.74 kg/m².     Physical Exam  Constitutional:       General: She is awake. She is not in acute distress.     Appearance: Normal appearance. She is obese. She is not toxic-appearing.   HENT:      Head: Normocephalic and atraumatic.      Nose: Nose normal. No congestion or rhinorrhea.      Mouth/Throat:      Mouth: Mucous membranes are moist.      Pharynx: Oropharynx is clear. No oropharyngeal exudate or posterior oropharyngeal erythema.   Eyes:      General: No scleral icterus.        Right eye: No discharge.         Left eye: No discharge.      Extraocular Movements: Extraocular movements intact.   Neck:      Thyroid: No thyroid mass or thyromegaly.      Vascular: No carotid bruit.      Meningeal: Brudzinski's sign and Kernig's sign absent.   Cardiovascular:      Rate and Rhythm: Normal rate and regular rhythm.      Chest Wall: PMI is not displaced. No  thrill.      Pulses: Normal pulses.      Heart sounds: Normal heart sounds. No murmur heard.     No friction rub. No gallop.   Pulmonary:      Effort: Pulmonary effort is normal. No tachypnea, accessory muscle usage, prolonged expiration or respiratory distress.      Breath sounds: Normal breath sounds. No stridor or decreased air movement. No wheezing, rhonchi or rales.   Chest:      Chest wall: No tenderness.   Abdominal:      General: Bowel sounds are normal. There is no distension.      Palpations: Abdomen is soft. There is no hepatomegaly, splenomegaly or mass.      Tenderness: There is no abdominal tenderness. There is no right CVA tenderness, left CVA tenderness, guarding or rebound.      Hernia: No hernia is present.   Musculoskeletal:         General: Tenderness present. No swelling or deformity.      Cervical back: Neck supple. No rigidity. No muscular tenderness.      Right lower leg: No edema.      Left lower leg: Edema present.      Comments: Surgical site clear   Lymphadenopathy:      Cervical: No cervical adenopathy.   Skin:     General: Skin is warm.      Capillary Refill: Capillary refill takes less than 2 seconds.      Coloration: Skin is not cyanotic, jaundiced or pale.      Findings: No erythema, petechiae or rash.   Neurological:      Mental Status: She is alert. Mental status is at baseline.      Cranial Nerves: No cranial nerve deficit, dysarthria or facial asymmetry.      Motor: Weakness present. No tremor.   Psychiatric:         Mood and Affect: Mood is not anxious or depressed. Affect is not flat.         Speech: Speech is not rapid and pressured or slurred.         Behavior: Behavior is not agitated, aggressive or combative.         Thought Content: Thought content is not paranoid or delusional.         Cognition and Memory: Cognition is not impaired. Memory is not impaired.      Comments: + learning impairment, + deaf              Significant Imaging: I have reviewed all pertinent imaging  results/findings within the past 24 hours.

## 2024-01-17 NOTE — PT/OT/SLP EVAL
"Speech-Language Pathology Evaluation    Lupe Santizo   MRN: [id number]   Admitting Diagnosis: Status post total left knee replacement    Diet recommendations: Solid Diet Level: Regular Diet - IDDSI Level 7  Liquid Diet Level: Thin liquids - IDDSI Level 0 Standard aspiration precautions    SLP Treatment Date: 01/17/24  Speech Start Time: 1345     Speech Stop Time: 1435     Speech Total (min): 50 min       TREATMENT BILLABLE MINUTES:  Eval x 50 min     Diagnosis: Status post total left knee replacement    Past Medical History:   Diagnosis Date    Anticoagulant long-term use     Deaf     History of DVT (deep vein thrombosis)     Primary osteoarthritis of left knee      Past Surgical History:   Procedure Laterality Date    CHOLECYSTECTOMY      KNEE SURGERY      TOTAL KNEE ARTHROPLASTY Left 1/11/2024    Procedure: ARTHROPLASTY, KNEE, TOTAL;  Surgeon: Phoenix Godoy MD;  Location: [facility];  Service: Orthopedics;  Laterality: Left;              General Precautions: Standard, fall, deaf          Social Hx: Patient lives with mother.    Subjective:  Pt received sitting upright in wheelchair. Spoke w/ nursing who denies dysphagia-related reports/concerns.   Pt communicates w/ SLP primarily using a white board 2/2 bilateral deafness; uses some gestures to communicate. Pt denies difficulty w/ swallowing.  Spoke w/ pt's mother who reports pt is able to functionally communicate w/ family via Sign language, written language (on white board and cell phone), and gestures. Pt's mother also reports pt's cognitive-communication skills are at baseline at this time. Mother/pt do not endorse current need/desire for skilled ST services.  Patient goals: "don't know."          Objective: results of this assessment are limited 2/2 communication barrier       Oral Musculature Evaluation  Oral Musculature: WFL  Dentition: present and adequate  Secretion Management: adequate  Mucosal Quality: adequate  Oral Labial Strength and Mobility: " functional retraction     Cognitive Status:  Behavioral Observations: alert, appropriate, and cooperative  Orientation: pt oriented to person, month, date, and state only; not oriented to year, day of week, city, and/or place  Memory: delayed recall of 0/5 words independently; 1/5 words given semantic cueing  Attention:  unable to assess 2/2 to communication barrier.  Problem Solving: unable to assess 2/2 to communication barrier  Pragmatics:  WNL  Executive Function:  unable to fully assess 2/2 communication barrier. Pt demonstrates appropriate clock drawing; however, unable to independently set clock to correct time    Auditory Comprehension: able to identify objects, answers yes/no questions, and able to follow commands    Expressive Language: pt nonverbal 2/2 bilateral deafness. Pt uses a white board as primary method of communication 2/2 bilateral deafness; also demonstrates use of gestures via shaking/nodding head in response to yes/no questions  Naming: Confrontation 4/5 correct and Single word responsive naming 1/3 correct   Object function: 1/3 correct    Pragmatics:  WNL    Assessment:  Lupe Santizo is a 43 y.o. female with a medical diagnosis of Status post total left knee replacement and presents w/ cognitive-communication difficulties for the purpose of assessment, likely 2/2 bilateral deafness. The results of this assessment are limited 2/2 communication barrier; however, pt demonstrates baseline cognitive-communication skills, per caregiver report. Skilled ST services not warranted at this time. Please re-consult if change in cognitive-linguistic/swallowing status occurs.     Spiritual, Cultural Beliefs, Gnosticist Practices, Values that Affect Care: no    Discharge recommendations: Therapy Intensity Recommendations at Discharge: No Therapy Indicated        Plan:     Plan of Care reviewed with: patient, mother  SLP Follow-up?: No; ST to educate medical staff on alternate means of communication and  provide pt/staff w/ relevant materials/resources                01/18/2024

## 2024-01-17 NOTE — CONSULTS
"  [facility])  Adult Nutrition  Consult Note    SUMMARY     Recommendations  1. Rec'd regular diet.   2. RD to follow and make rec's accordingly.  Goals:   1. Pt will continue to consume > 75% of meals by next RD follow up.  Nutrition Goal Status: new  Communication of RD Recs: reviewed with RN    Assessment and Plan    Nutrition Problem  Obese, Class II    Related to (etiology):   Predicted excessive energy intake    Signs and Symptoms (as evidenced by):   BMI = 35.72     Interventions/Recommendations (treatment strategy):  1. Rec'd regular diet.   2. RD to follow and make rec's accordingly.    Nutrition Diagnosis Status:   New     Reason for Assessment    Reason For Assessment: consult (New IPR admit)  Diagnosis: other (see comments) (Status post total knee replacement)  Relevant Medical History: Deaf, History of DVT, Primary osteoarthritis of left knee  Interdisciplinary Rounds: did not attend  General Information Comments: RD consulted for new IPR admit. Spoke with pt about current appetite and PO intake. Pt stated her appetite is good. Pt typically eats % of meals. Attempted to get food preferences/dislikes from pt and pt stated "I dont know" for dislikes. Pt did state that her favorite food is salad. RD to follow and make rec's accordingly.  Nutrition Discharge Planning: TBD as care progresses    Nutrition Risk Screen    Nutrition Risk Screen: no indicators present    Nutrition/Diet History    Patient Reported Diet/Restrictions/Preferences: general  Food Preferences: Likes: Salad  Spiritual, Cultural Beliefs, Jewish Practices, Values that Affect Care: no  Food Allergies: NKFA    Anthropometrics    Temp: 97.8 °F (36.6 °C)  Height Method: Stated  Height: 5' 4" (162.6 cm)  Height (inches): 64 in  Weight Method: Standard Scale  Weight: 94.4 kg (208 lb 1.8 oz)  Weight (lb): 208.12 lb  Ideal Body Weight (IBW), Female: 120 lb  % Ideal Body Weight, Female (lb): 173.43 %  BMI (Calculated): " 35.7    Lab/Procedures/Meds    Pertinent Labs Reviewed: reviewed  Pertinent Medications Reviewed: reviewed    Estimated/Assessed Needs    Weight Used For Calorie Calculations: 64.4 kg (142 lb) (AdjBW)  Energy Calorie Requirements (kcal): 7447-0199 (20-25kcal/kg)  Energy Need Method: Kcal/kg  Protein Requirements: 43-54 (0.8-1.0g/kg IBW)  Weight Used For Protein Calculations: 54.4 kg (119 lb 15.9 oz)  Fluid Requirements (mL): 1511-1273 (1mL/kcal)  Estimated Fluid Requirement Method: RDA Method  RDA Method (mL): 1288    Nutrition Prescription Ordered    Current Diet Order: Regular  Oral Nutrition Supplement: None    Evaluation of Received Nutrient/Fluid Intake    % Kcal Needs: 75%  % Protein Needs: 75%  I/O: -750  Protein Required: meeting needs  Fluid Required: meeting needs  Tolerance: tolerating  % Intake of Estimated Energy Needs: 75 - 100 %  % Meal Intake: 75 - 100 %    Nutrition Risk    Level of Risk/Frequency of Follow-up: low     Monitor and Evaluation    Food and Nutrient Intake: energy intake, food and beverage intake  Food and Nutrient Adminstration: diet order  Knowledge/Beliefs/Attitudes: food and nutrition knowledge/skill, beliefs and attitudes  Physical Activity and Function: nutrition-related ADLs and IADLs  Anthropometric Measurements: height/length, weight, weight change, body mass index  Biochemical Data, Medical Tests and Procedures: electrolyte and renal panel, gastrointestinal profile, glucose/endocrine profile, inflammatory profile, lipid profile  Nutrition-Focused Physical Findings: overall appearance     Nutrition Follow-Up    RD Follow-up?: Yes

## 2024-01-17 NOTE — ASSESSMENT & PLAN NOTE
Chronic, controlled. Latest blood pressure and vitals reviewed-     Temp:  [96.7 °F (35.9 °C)-97.8 °F (36.6 °C)]   Pulse:  [71-78]   Resp:  [18-20]   BP: (107-115)/(50-75)   SpO2:  [97 %] .   Home meds for hypertension were reviewed and noted below.       While in the hospital, will manage blood pressure as follows; Continue home antihypertensive regimen    Will utilize p.r.n. blood pressure medication only if patient's blood pressure greater than 140/90 and she develops symptoms such as worsening chest pain or shortness of breath.

## 2024-01-17 NOTE — H&P
[facility] Medicine  History & Physical / TRACY    Patient Name: Lupe Santizo  MRN: [id number]  Patient Class: IP- Rehab  Admission Date: 1/16/2024  Attending Physician: Ricardo Mckeon III, MD  Primary Care Provider: Alicia Primary Doctor         Patient information was obtained from patient, past medical records, and ER records.     Subjective:     Principal Problem:Status post total left knee replacement    Chief Complaint:   Chief Complaint   Patient presents with    I had surgery        HPI:   History of present illness:      Lupe Santizo is a 43 y.o. female who presents for evaluation of their left knee pain. Mother reports daughter's pain has been present for years. Patient is both unable to hear and unable to speak, therefore history is from mother who is also MPOA. Patient also has a learning disability. Mother reports that the knee is the primary source of pain for the patient. Pain is mostly located medially. She had an injection at her last visit which gave her temporarily relief. She is on Xarelto for a DVT following gallbladder surgery, which she will be on for life. Patient returns today for f/u of L knee OA. She has since obtained clearance form her PCP and cardiologist which shows that she is low to intermediate risk and is optimized for surgery and okay to hold Xarelto 3 days preop that she is a high risk for recurrent DVT. She would like to proceed with TKA.      Patient underwent a successful left total knee arthroplasty and postoperatively her recovery has been uneventful.  Her care team reached out to us because of her learning impairment and communication challenges and felt that it was safest for her to recover and an inpatient rehab setting to avoid further complications and morbidity.  I have seen and evaluated the patient this morning we are communicating through a tablet patient is smiling her mood seems to be good she is complaining of pain at the knee her  surgical site seems clear her dressing does have some serosanguineous discharge.  Patient's chart has been reviewed and reconciled all medications updated.     Pt. Requires acute inpatient rehab admission with 24-hour nursing and active physician oversight to monitor and manage acute medical comorbid conditions, labs, pain, and functional deficits. Patient/family will also require teaching and integration of improving functional skills into daily living. She will also require an individualized, interdisciplinary approach to her care, receiving PT, OT services 3 hours per day, 5 days per week. Required care cannot be provided at a lower level of care. Patient is anticipated to require approximately 10-14 days LOS with expected discharge home with mother and with      Impairment group (IGC):   Unilateral Kneee Replacements 08.61 Etiologic diagnosis/description:   M17.12: Osteoarthritis of left knee   Date of onset:  1/11/2024 Date of surgery:  1/11/2024   Allergies: Patient has no known allergies.   Comorbid condition: Deaf, hx of CVA, unable to speak, hx of DVT, OA   Medical/functional conditions requiring inpatient rehabilitation:      This patient requires medical management/24-hour nursing of complex co morbidities Deaf, hx of CVA, unable to speak, hx of DVT, OA, labs, medications (see medications list), pain, sleep hygiene, anticoagulation, nutrition, hydration, neurological,  and preventive healthcare.     This patient requires intense therapy and an integrated, interdisciplinary approach to address safety, impaired mobility, impaired ADLs (dressing, toileting, grooming, showering), judgment, and memory, communication, bowel/bladder problems,preventive healthcare, medication management, integration of functional skills into daily living, and home caregiver support and training.      Risk for medical/clinical complications:      This patient is at risk for the following complications: DVT/PE, pneumonia,  malnutrition, worsening activity intolerance, complications from anticoagulation,  skin breakdown, inadequate sleep, recurring stroke, and constipation.        Past Medical History:   Diagnosis Date    Deaf     History of DVT (deep vein thrombosis)     Primary osteoarthritis of left knee        Past Surgical History:   Procedure Laterality Date    CHOLECYSTECTOMY      KNEE SURGERY      TOTAL KNEE ARTHROPLASTY Left 2024    Procedure: ARTHROPLASTY, KNEE, TOTAL;  Surgeon: Phoenix Godoy MD;  Location: [facility];  Service: Orthopedics;  Laterality: Left;       Review of patient's allergies indicates:  No Known Allergies    Current Facility-Administered Medications on File Prior to Encounter   Medication    methylPREDNISolone acetate injection 40 mg    [] mupirocin 2 % ointment 1 g    [DISCONTINUED] acetaminophen tablet 1,000 mg    [DISCONTINUED] bisacodyL suppository 10 mg    [DISCONTINUED] famotidine tablet 20 mg    [DISCONTINUED] gabapentin capsule 300 mg    [DISCONTINUED] ibuprofen tablet 800 mg    [DISCONTINUED] methocarbamoL tablet 750 mg    [DISCONTINUED] metoclopramide injection 5 mg    [DISCONTINUED] morphine injection 2 mg    [DISCONTINUED] ondansetron disintegrating tablet 8 mg    [DISCONTINUED] oxyCODONE immediate release tablet 5 mg    [DISCONTINUED] oxyCODONE immediate release tablet Tab 10 mg    [DISCONTINUED] polyethylene glycol packet 17 g    [DISCONTINUED] rivaroxaban tablet 20 mg    [DISCONTINUED] senna-docusate 8.6-50 mg per tablet 1 tablet    [DISCONTINUED] sodium chloride 0.9% flush 10 mL    [DISCONTINUED] ziprasidone capsule 40 mg     Current Outpatient Medications on File Prior to Encounter   Medication Sig    acetaminophen (TYLENOL) 500 MG tablet Take 2 tablets (1,000 mg total) by mouth every 8 (eight) hours as needed for Pain.    diclofenac (VOLTAREN) 75 MG EC tablet Take 1 tablet (75 mg total) by mouth 2 (two) times daily.    gabapentin (NEURONTIN) 300 MG capsule Take 1 capsule (300  mg total) by mouth 3 (three) times daily.    methocarbamoL (ROBAXIN) 750 MG Tab Take 1 tablet (750 mg total) by mouth 3 (three) times daily as needed (Pain not relieved with Tylenol, diclofenac, or gabapentin).    oxyCODONE (ROXICODONE) 5 MG immediate release tablet Take 1 tablet (5 mg total) by mouth every 4 (four) hours as needed for Pain (pain not relieved by all other medications).    rivaroxaban (XARELTO) 20 mg Tab Take 20 mg by mouth.    ziprasidone (GEODON) 40 MG Cap TAKE 1 CAPSULE BY MOUTH DAILY AT BEDTIME TAKE WITH FOOD     Family History    None       Tobacco Use    Smoking status: Never    Smokeless tobacco: Current   Substance and Sexual Activity    Alcohol use: Never    Drug use: Never    Sexual activity: Not Currently     Review of Systems   Unable to perform ROS: Patient nonverbal     Objective:     Vital Signs (Most Recent):  Temp: 97.8 °F (36.6 °C) (01/17/24 0427)  Pulse: 71 (01/17/24 0427)  Resp: 20 (01/17/24 0826)  BP: (!) 109/58 (01/17/24 0427)  SpO2: 97 % (01/17/24 0427) Vital Signs (24h Range):  Temp:  [96.7 °F (35.9 °C)-97.8 °F (36.6 °C)] 97.8 °F (36.6 °C)  Pulse:  [71-78] 71  Resp:  [18-20] 20  SpO2:  [97 %] 97 %  BP: (107-115)/(50-75) 109/58     Weight: 94.4 kg (208 lb 3.2 oz)  Body mass index is 35.74 kg/m².     Physical Exam  Constitutional:       General: She is awake. She is not in acute distress.     Appearance: Normal appearance. She is obese. She is not toxic-appearing.   HENT:      Head: Normocephalic and atraumatic.      Nose: Nose normal. No congestion or rhinorrhea.      Mouth/Throat:      Mouth: Mucous membranes are moist.      Pharynx: Oropharynx is clear. No oropharyngeal exudate or posterior oropharyngeal erythema.   Eyes:      General: No scleral icterus.        Right eye: No discharge.         Left eye: No discharge.      Extraocular Movements: Extraocular movements intact.   Neck:      Thyroid: No thyroid mass or thyromegaly.      Vascular: No carotid bruit.      Meningeal:  Brudzinski's sign and Kernig's sign absent.   Cardiovascular:      Rate and Rhythm: Normal rate and regular rhythm.      Chest Wall: PMI is not displaced. No thrill.      Pulses: Normal pulses.      Heart sounds: Normal heart sounds. No murmur heard.     No friction rub. No gallop.   Pulmonary:      Effort: Pulmonary effort is normal. No tachypnea, accessory muscle usage, prolonged expiration or respiratory distress.      Breath sounds: Normal breath sounds. No stridor or decreased air movement. No wheezing, rhonchi or rales.   Chest:      Chest wall: No tenderness.   Abdominal:      General: Bowel sounds are normal. There is no distension.      Palpations: Abdomen is soft. There is no hepatomegaly, splenomegaly or mass.      Tenderness: There is no abdominal tenderness. There is no right CVA tenderness, left CVA tenderness, guarding or rebound.      Hernia: No hernia is present.   Musculoskeletal:         General: Tenderness present. No swelling or deformity.      Cervical back: Neck supple. No rigidity. No muscular tenderness.      Right lower leg: No edema.      Left lower leg: Edema present.      Comments: Surgical site clear   Lymphadenopathy:      Cervical: No cervical adenopathy.   Skin:     General: Skin is warm.      Capillary Refill: Capillary refill takes less than 2 seconds.      Coloration: Skin is not cyanotic, jaundiced or pale.      Findings: No erythema, petechiae or rash.   Neurological:      Mental Status: She is alert. Mental status is at baseline.      Cranial Nerves: No cranial nerve deficit, dysarthria or facial asymmetry.      Motor: Weakness present. No tremor.   Psychiatric:         Mood and Affect: Mood is not anxious or depressed. Affect is not flat.         Speech: Speech is not rapid and pressured or slurred.         Behavior: Behavior is not agitated, aggressive or combative.         Thought Content: Thought content is not paranoid or delusional.         Cognition and Memory: Cognition  is not impaired. Memory is not impaired.      Comments: + learning impairment, + deaf              Significant Imaging: I have reviewed all pertinent imaging results/findings within the past 24 hours.  Assessment/Plan:     * Status post total left knee replacement  Patient's pain seems to be fairly well controlled postoperatively agree with current regimen and adjust as needed.  PT/OT evaluations reviewed.      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      Essential hypertension  Chronic, controlled. Latest blood pressure and vitals reviewed-     Temp:  [96.7 °F (35.9 °C)-97.8 °F (36.6 °C)]   Pulse:  [71-78]   Resp:  [18-20]   BP: (107-115)/(50-75)   SpO2:  [97 %] .   Home meds for hypertension were reviewed and noted below.       While in the hospital, will manage blood pressure as follows; Continue home antihypertensive regimen    Will utilize p.r.n. blood pressure medication only if patient's blood pressure greater than 140/90 and she develops symptoms such as worsening chest pain or shortness of breath.    Bilateral deafness  Patient able to communicate with tablet and has some basic reading and writing skills.      Obesity (BMI 30-39.9)  Body mass index is 35.74 kg/m². Morbid obesity complicates all aspects of disease management from diagnostic modalities to treatment. Weight loss encouraged and health benefits explained to patient.         Primary osteoarthritis of left knee  Patient's pain seems to be fairly well controlled postoperatively agree with current regimen and adjust as needed.        VTE Risk Mitigation (From admission, onward)           Ordered     rivaroxaban tablet 20 mg  with dinner         01/16/24 1435     IP VTE LOW RISK PATIENT  Once         01/16/24 1435     Place sequential compression device  Until discontinued         01/16/24 1435                                    Ricardo Mckeon  III, MD  Department of Hospital Medicine  [facility]

## 2024-01-17 NOTE — ASSESSMENT & PLAN NOTE
Patient's pain seems to be fairly well controlled postoperatively agree with current regimen and adjust as needed.  PT/OT evaluations reviewed.

## 2024-01-17 NOTE — HPI
History of present illness:      Lupe Santizo is a 43 y.o. female who presents for evaluation of their left knee pain. Mother reports daughter's pain has been present for years. Patient is both unable to hear and unable to speak, therefore history is from mother who is also MPOA. Patient also has a learning disability. Mother reports that the knee is the primary source of pain for the patient. Pain is mostly located medially. She had an injection at her last visit which gave her temporarily relief. She is on Xarelto for a DVT following gallbladder surgery, which she will be on for life. Patient returns today for f/u of L knee OA. She has since obtained clearance form her PCP and cardiologist which shows that she is low to intermediate risk and is optimized for surgery and okay to hold Xarelto 3 days preop that she is a high risk for recurrent DVT. She would like to proceed with TKA.      Patient underwent a successful left total knee arthroplasty and postoperatively her recovery has been uneventful.  Her care team reached out to us because of her learning impairment and communication challenges and felt that it was safest for her to recover and an inpatient rehab setting to avoid further complications and morbidity.  I have seen and evaluated the patient this morning we are communicating through a tablet patient is smiling her mood seems to be good she is complaining of pain at the knee her surgical site seems clear her dressing does have some serosanguineous discharge.  Patient's chart has been reviewed and reconciled all medications updated.     Pt. Requires acute inpatient rehab admission with 24-hour nursing and active physician oversight to monitor and manage acute medical comorbid conditions, labs, pain, and functional deficits. Patient/family will also require teaching and integration of improving functional skills into daily living. She will also require an individualized, interdisciplinary approach to  her care, receiving PT, OT services 3 hours per day, 5 days per week. Required care cannot be provided at a lower level of care. Patient is anticipated to require approximately 10-14 days LOS with expected discharge home with mother and with      Impairment group (IGC):   Unilateral Kneee Replacements 08.61 Etiologic diagnosis/description:   M17.12: Osteoarthritis of left knee   Date of onset:  1/11/2024 Date of surgery:  1/11/2024   Allergies: Patient has no known allergies.   Comorbid condition: Deaf, hx of CVA, unable to speak, hx of DVT, OA   Medical/functional conditions requiring inpatient rehabilitation:      This patient requires medical management/24-hour nursing of complex co morbidities Deaf, hx of CVA, unable to speak, hx of DVT, OA, labs, medications (see medications list), pain, sleep hygiene, anticoagulation, nutrition, hydration, neurological,  and preventive healthcare.     This patient requires intense therapy and an integrated, interdisciplinary approach to address safety, impaired mobility, impaired ADLs (dressing, toileting, grooming, showering), judgment, and memory, communication, bowel/bladder problems,preventive healthcare, medication management, integration of functional skills into daily living, and home caregiver support and training.      Risk for medical/clinical complications:      This patient is at risk for the following complications: DVT/PE, pneumonia, malnutrition, worsening activity intolerance, complications from anticoagulation,  skin breakdown, inadequate sleep, recurring stroke, and constipation.

## 2024-01-17 NOTE — PT/OT/SLP EVAL
Physical Therapy Rehab Evaluation    Patient Name:  Lupe Santizo   MRN:  [id number]    Recommendations:     Discharge Recommendations:  Low Intensity Therapy   Discharge Equipment Recommendations: walker, rolling, bedside commode, bath bench   Barriers to discharge: None    Assessment:     Lupe Santizo is a 43 y.o. female admitted with a medical diagnosis of Status post total left knee replacement.  She presents with the following impairments/functional limitations:    weakness, pain, cognitive deficits, impaired coordination, impaired balance, gait deviations, difficulty with functional transfers, difficulty with bed mobility, difficulty with wheelchair propulsion, impaired sensation, impaired proprioception, decreased motor control, decreased safety, and decreased endurance.    Patient is deaf, she communicates via writing. Patient presents with 0/5 strength on the right ankle dorsiflexors, decrease active ROM on both ankle (can go to neutral only), hyperextension of the right knee passively done. Absent sensation on the right leg.  Patient presents with plantarflexed foot, knee flexed and forward flexed posture with antalgic gait pattern.  Attempted to do stairs, curb and ramp but patient is unable to complete  due to fear and/or pain.     .    Rehab Diagnosis:  Left TKA, WBAT    Recent Surgery: * No surgery found *      General Precautions: Standard, fall, deaf     Orthopedic Precautions: LLE weight bearing as tolerated     Braces: N/A    Rehab Prognosis: Good and Fair; patient would benefit from acute skilled PT services to address these deficits and reach maximum level of function.      History:     Past Medical History:   Diagnosis Date    Deaf     History of DVT (deep vein thrombosis)     Primary osteoarthritis of left knee        Past Surgical History:   Procedure Laterality Date    CHOLECYSTECTOMY      KNEE SURGERY      TOTAL KNEE ARTHROPLASTY Left 1/11/2024    Procedure: ARTHROPLASTY, KNEE, TOTAL;   Surgeon: Phoenix Godoy MD;  Location: [facility];  Service: Orthopedics;  Laterality: Left;       Subjective     Chief Complaint: Pain on the left knee   Patient/Family Comments/goals: Unstated     Patients cultural, spiritual, Taoism conflicts given the current situation: no       Living Environment  People in Home: parent(s)  Name(s) of People in Home: Liliane Santizo [phone]  Living Arrangements: house  Home Accessibility: stairs to enter home  Number of Stairs, Main Entrance: three  Stair Railings at Home: none  Home Layout: Able to live on 1st floor  Transportation Anticipated: family or friend will provide  Equipment Currently Used at Home: wheelchair, rollator    Prior Level of Function  Ambulation Skills: needs device and assist  Stairs: unable to perform  Transfer Skills: needs device and assist  ADL Skills: needs device and assist  Work/Leisure Activity: needs device and assist  Cognitive Communication: needs device    Equipment used at home: wheelchair, rollator.  DME owned (not currently used): none.      Upon discharge, patient will have assistance from mother. .    Objective:     Communicated with nurse, patient and OT  prior to session.  Patient found up in chair with    upon PT entry to room.    Vitals   Vitals at Rest  BP  120/72 mmHg   HR  78 bpm    O2 Sat  96%    Pain Pain Rating 1: 10/10  Location - Side 1: Left  Location 1: knee  Pain Addressed 1: Distraction, Reposition, Nurse notified  Pain Rating Post-Intervention 1: 10/10       Respiratory Status: Room air    Exams  Cognitive Exam:  Patient is oriented to Person, Place, and Situation  Postural Exam:  Patient presented with the following abnormalities:    -       Rounded shoulders  -       left knee flexed  Sensation:    impaired on the right LE       Skin Integrity/Edema:   edema on the left LE  RLE ROM:    decrease hip, knee, and ankle (neutral ankle dorsiflexion)       RLE Strength:    grossly graded 3-/5    except ankle  dorsiflexion 0/5   LLE ROM:  decrease hip, knee (-45 to 85 degrees done actively, -15 to 95 degrees done passively, measured in sitting) and ankle (neutral dorsiflexion)         LLE Strength:   grossly graded 3/5        Tone:   normotonic        -       WFL  Coordination:  intact           Functional Mobility  Bed Mobility:     Rolling Left:  Supervision or touching assistance   Rolling Right: Supervision or touching assistance   Scooting: Supervision or touching assistance   Supine to Sit: Supervision or touching assistance   Sit to Supine: Supervision or touching assistance   Transfers:     Sit to Stand: Supervision or touching assistance  with rolling walker  Chair to mat: Supervision or touching assistance  with  rolling walker  using  Step Transfer  Gait: Pt ambulates 15 feet  with RW with Partial/moderate assistance .   Balance: Static Sitting/Standing  Patient performed static sitting on low mat using  no A.D.   with Supervision or touching assistance  and minimal verbal cues.    Static Sit: FAIR: Maintains without assist, but unable to take any challenges   Static Stand: FAIR: Maintains without assist but unable to take challenges    Dynamic Sitting/Standing  Patient performed dynamic sitting on low mat using  no A.D.   with Supervision or touching assistance  and minimal verbal cues during minimal excursions.    Dynamic Sit: FAIR+: Maintains balance through MINIMAL excursions of active trunk motion  Dynamic Stand: POOR+: Needs MIN (minimal ) assist during gait   object from ground in standing position with reacher with rolling walker with Supervision or touching assistance   Wheelchair Propulsion:  Pt propelled Standard wheelchair x 300 feet on Level tile with  Bilateral upper extremity with Set-up or clean-up assistance .     GG quality indicator     Advance gait activities  .   Going up and down a ramp (10 feet uneven surface) Initiated but unable to complete the task   2 inch curb  Activity did  not occur due to safety    Car transfers Activity did not occur due to environmental limitations      GGs   Admit Current   Status Goal   Functional Area: Care Score: Care Score: Care Score:   Roll Left and Right 4 4 Set-up/clean-up   Sit to Lying 4 4 Set-up/clean-up   Lying to Sitting on Side of Bed 4 4 Set-up/clean-up   Sit to Stand 4 4 Set-up/clean-up   Chair/Bed-to-Chair Transfer 4 4 Set-up/clean-up   Car Transfer 10 10 Supervision or touching assistance   Walk 10 Feet 3 3 Supervision or touching assistance   Walk 50 Feet with Two Turns 88 88 Supervision or touching assistance   Walk 150 Feet 88 88 Supervision or touching assistance   Walk 10 Feet Uneven Surface 88 88 Supervision or touching assistance   1 Step (Curb) 88 88 Supervision or touching assistance   4 Steps 88 88 Supervision or touching assistance   12 Steps 88 88 Not applicable   Picking Up Object 4 4 Set-up/clean-up   Wheel 50 Feet with Two Turns 5 5 Independent   Wheel 150 Feet 5 5 Independent     Therapeutic Activities and Exercises:  P.T. evaluation, education on P.T. roles and goals, safety with functional mobility         Activity Tolerance: Fair    Patient left up in chair with call button in reach and nurse  notified.    Education Provided: roles and goals of PT/PTA, transfer training, bed mob, gait training, balance training, safety awareness, body mechanics, assistive device, wheelchair management, strengthening exercises, fall prevention, and WBAT     Expected compliance: Moderate compliance    GOALS:   Multidisciplinary Problems       Physical Therapy Goals          Problem: Physical Therapy    Goal Priority Disciplines Outcome Goal Variances Interventions   Physical Therapy Goal     PT, PT/OT Ongoing, Progressing     Description:   Short Term Goals: 1/24/2024   Long Term Goals: 1/31/2024     Transfers Status    Sit to Stand  Short Term Goal: Complete sit to stand with Stand-by Assistance using Rolling Walker with minimal  verbal  cues  Long Term Goal:  Complete sit to stand with Supervision or Set-up Assistance using Rolling Walker     Stand Step  Short Term Goal: Complete stand step with  Stand-by Assistance  using Rolling Walker with minimal   verbal cues  Long Term Goal:  Complete stand step with  Supervision or Set-up Assistance  using Rolling Walker     Supine <> Sit    Long Term Goal: Complete supine <> sit with Supervision or Set-up Assistance      Rolling Right/Left  Long Term Goal: Complete rolling  right/left with Supervision or Set-up Assistance     Balance/Coordination    Static Sitting  Long Term Goal: Complete static sitting with Modified Independent      Dynamic Sitting  Long Term Goal: Complete dynamic sitting with Supervision or Set-up Assistance  with  minimal  verbal cues minimal excursions    Static Standing  Short Term Goal: Complete static standing with  Stand-by Assistance with  minimal  verbal cues and RW  Long Term Goal: Complete static standing with Supervision or Set-up Assistance  with   minimal   verbal cues and RW    Dynamic Standing  Short Term Goal: Complete dynamic standing with Stand-by Assistance  with minimal   verbal cues minimal  excursions with RW  Long Term Goal: Complete dynamic standing with Supervision or Set-up Assistance with minimal   verbal cues and minimal  excursions with RW    Endurance    Short Term Goal:   Physical Therapy: 2 times a day, 30-45 minutes  Rest periods: multiple  Sitting: 3 hours/day    Long Term Goal:  Physical Therapy: 2 times a day, 45 minutes  Rest periods: minimal  Sittin-8 hours/day    Mobility/Gait  Short Term Goal: Will ambulate with Stand-by Assistance  Using Rolling Walker with minimal   verbal cues x 100 ft  Long Term Goal: Will ambulate with Stand-by Assistance  using Rolling Walker with minimal  verbal cues x 150 ft    Stairs Assistance  Long Term Goal: Patient will ascend/descend 4 stairs/steps using both  handrails  nonreciprocal steps with Minimal  Assistance and minimal  verbal cues    Wheelchair Skills/Surface  Long Term Goal: Patient will propel Standard wheelchair x 300 feet with Level tileWITH Bilateral upper extremity with Modified Independent       Ramp/Uneven 10 feet surface  Long  Term Goal; Patient will be able to navigate a 10 inch uneven surface with proper A.D. with  contact guard assistance/stand by assistance       2-6  inch curb  Long  Term Goal; Patient will be able to navigate a  2  inch curb with RW with  minimal assistance     Picking up object   Long  Term Goal; Patient will be able to  a small object from the floor  with a RW . with  stand by assistance     Car transfers  Long  Term Goal; Patient will be able to get in and out of a vehicle  with RW  with  minimal assistance     Education  Long Term Goals:  Patient/family/caregivers trained on physical mobility and precautions with Supervision.    Patient/family/caregivers trained on safety awareness with Supervision.    Order and obtain all appropriate equipment.                          Plan:     During this hospitalization, patient to be seen 5 x/week to address the identified rehab impairments via gait training, therapeutic activities, therapeutic exercises, neuromuscular re-education, wheelchair management/training and progress toward the following goals:    Plan of Care Expires:  01/31/24  PT Next Visit Date: 01/18/24  Plan of Care reviewed with: patient      Additional Infomation:           Time Tracking:     Therapy Time   PT Received On: 01/17/24  PT Start Time: 1100  PT Stop Time: 1200  PT Total Time (min): 60 min  PT Individual: 30  PT Concurrent: 30      Billable Minutes: Evaluation moderate complexity     01/17/2024

## 2024-01-17 NOTE — PLAN OF CARE
No issues reported over night.     Problem: Rehabilitation (IRF) Plan of Care  Goal: Plan of Care Review  Outcome: Ongoing, Progressing  Goal: Patient-Specific Goal (Individualized)  Outcome: Ongoing, Progressing  Goal: Absence of New-Onset Illness or Injury  Outcome: Ongoing, Progressing  Goal: Optimal Comfort and Wellbeing  Outcome: Ongoing, Progressing  Goal: Readiness for Transition of Care  Outcome: Ongoing, Progressing     Problem: Bariatric Environmental Safety  Goal: Safety Maintained with Care  Outcome: Ongoing, Progressing     Problem: Fall Injury Risk  Goal: Absence of Fall and Fall-Related Injury  Outcome: Ongoing, Progressing     Problem: Bariatric Environmental Safety  Goal: Safety Maintained with Care  Outcome: Ongoing, Progressing     Problem: Fall Injury Risk  Goal: Absence of Fall and Fall-Related Injury  Outcome: Ongoing, Progressing     Problem: Skin Injury Risk Increased  Goal: Skin Health and Integrity  Outcome: Ongoing, Progressing     Problem: Pain Acute  Goal: Acceptable Pain Control and Functional Ability  Outcome: Ongoing, Progressing     Problem: Surgical Site Infection  Goal: Absence of Infection Signs and Symptoms  Outcome: Ongoing, Progressing     Problem: Mobility Impairment  Goal: Optimal Mobility  Outcome: Ongoing, Progressing

## 2024-01-18 PROBLEM — Z79.899 ON DEEP VEIN THROMBOSIS (DVT) PROPHYLAXIS: Status: ACTIVE | Noted: 2024-01-18

## 2024-01-18 LAB
ALBUMIN SERPL BCP-MCNC: 2.9 G/DL (ref 3.5–5.2)
ALP SERPL-CCNC: 62 U/L (ref 55–135)
ALT SERPL W/O P-5'-P-CCNC: 39 U/L (ref 10–44)
ANION GAP SERPL CALC-SCNC: 4 MMOL/L (ref 3–11)
AST SERPL-CCNC: 44 U/L (ref 10–40)
BASOPHILS # BLD AUTO: 0.05 K/UL (ref 0–0.2)
BASOPHILS NFR BLD: 0.6 % (ref 0–1.9)
BILIRUB SERPL-MCNC: 0.4 MG/DL (ref 0.1–1)
BUN SERPL-MCNC: 14 MG/DL (ref 6–20)
CALCIUM SERPL-MCNC: 9 MG/DL (ref 8.7–10.5)
CHLORIDE SERPL-SCNC: 108 MMOL/L (ref 95–110)
CO2 SERPL-SCNC: 29 MMOL/L (ref 23–29)
CREAT SERPL-MCNC: 0.8 MG/DL (ref 0.5–1.4)
DIFFERENTIAL METHOD BLD: ABNORMAL
EOSINOPHIL # BLD AUTO: 0.3 K/UL (ref 0–0.5)
EOSINOPHIL NFR BLD: 3.4 % (ref 0–8)
ERYTHROCYTE [DISTWIDTH] IN BLOOD BY AUTOMATED COUNT: 14.4 % (ref 11.5–14.5)
EST. GFR  (NO RACE VARIABLE): >60 ML/MIN/1.73 M^2
GLUCOSE SERPL-MCNC: 101 MG/DL (ref 70–110)
HCT VFR BLD AUTO: 36.6 % (ref 37–48.5)
HGB BLD-MCNC: 11.7 G/DL (ref 12–16)
IMM GRANULOCYTES # BLD AUTO: 0.02 K/UL (ref 0–0.04)
IMM GRANULOCYTES NFR BLD AUTO: 0.3 % (ref 0–0.5)
LYMPHOCYTES # BLD AUTO: 2.1 K/UL (ref 1–4.8)
LYMPHOCYTES NFR BLD: 27.2 % (ref 18–48)
MCH RBC QN AUTO: 26.6 PG (ref 27–31)
MCHC RBC AUTO-ENTMCNC: 32 G/DL (ref 32–36)
MCV RBC AUTO: 83 FL (ref 82–98)
MONOCYTES # BLD AUTO: 0.7 K/UL (ref 0.3–1)
MONOCYTES NFR BLD: 8.8 % (ref 4–15)
NEUTROPHILS # BLD AUTO: 4.6 K/UL (ref 1.8–7.7)
NEUTROPHILS NFR BLD: 59.7 % (ref 38–73)
NRBC BLD-RTO: 0 /100 WBC
PLATELET # BLD AUTO: 247 K/UL (ref 150–450)
PMV BLD AUTO: 11.6 FL (ref 9.2–12.9)
POTASSIUM SERPL-SCNC: 3.5 MMOL/L (ref 3.5–5.1)
PROT SERPL-MCNC: 6.7 G/DL (ref 6–8.4)
RBC # BLD AUTO: 4.4 M/UL (ref 4–5.4)
SODIUM SERPL-SCNC: 141 MMOL/L (ref 136–145)
WBC # BLD AUTO: 7.73 K/UL (ref 3.9–12.7)

## 2024-01-18 PROCEDURE — 97112 NEUROMUSCULAR REEDUCATION: CPT

## 2024-01-18 PROCEDURE — 80053 COMPREHEN METABOLIC PANEL: CPT | Performed by: INTERNAL MEDICINE

## 2024-01-18 PROCEDURE — 85025 COMPLETE CBC W/AUTO DIFF WBC: CPT | Performed by: INTERNAL MEDICINE

## 2024-01-18 PROCEDURE — 97530 THERAPEUTIC ACTIVITIES: CPT

## 2024-01-18 PROCEDURE — 25000003 PHARM REV CODE 250: Performed by: STUDENT IN AN ORGANIZED HEALTH CARE EDUCATION/TRAINING PROGRAM

## 2024-01-18 PROCEDURE — 97110 THERAPEUTIC EXERCISES: CPT

## 2024-01-18 PROCEDURE — 97535 SELF CARE MNGMENT TRAINING: CPT

## 2024-01-18 PROCEDURE — 25000003 PHARM REV CODE 250: Performed by: INTERNAL MEDICINE

## 2024-01-18 PROCEDURE — 97116 GAIT TRAINING THERAPY: CPT

## 2024-01-18 PROCEDURE — 11800000 HC REHAB PRIVATE ROOM

## 2024-01-18 PROCEDURE — 36415 COLL VENOUS BLD VENIPUNCTURE: CPT | Performed by: INTERNAL MEDICINE

## 2024-01-18 RX ORDER — AMOXICILLIN 250 MG
1 CAPSULE ORAL DAILY
Status: DISCONTINUED | OUTPATIENT
Start: 2024-01-19 | End: 2024-01-26 | Stop reason: HOSPADM

## 2024-01-18 RX ADMIN — FAMOTIDINE 20 MG: 20 TABLET, FILM COATED ORAL at 08:01

## 2024-01-18 RX ADMIN — GABAPENTIN 300 MG: 300 CAPSULE ORAL at 02:01

## 2024-01-18 RX ADMIN — ACETAMINOPHEN 650 MG: 325 TABLET ORAL at 08:01

## 2024-01-18 RX ADMIN — OXYCODONE HYDROCHLORIDE 5 MG: 5 TABLET ORAL at 11:01

## 2024-01-18 RX ADMIN — ZIPRASIDONE HYDROCHLORIDE 40 MG: 40 CAPSULE ORAL at 08:01

## 2024-01-18 RX ADMIN — GABAPENTIN 300 MG: 300 CAPSULE ORAL at 08:01

## 2024-01-18 RX ADMIN — SENNOSIDES AND DOCUSATE SODIUM 1 TABLET: 8.6; 5 TABLET ORAL at 08:01

## 2024-01-18 RX ADMIN — RIVAROXABAN 20 MG: 10 TABLET, FILM COATED ORAL at 04:01

## 2024-01-18 NOTE — PT/OT/SLP PROGRESS
Occupational Therapy Inpatient Rehab Treatment    Name: Lupe Santizo  MRN: [id number]    Assessment:  Lupe Santizo is a 43 y.o. female admitted with a medical diagnosis of Status post total left knee replacement.  She presents with the following impairments/functional limitations:  weakness, impaired endurance, impaired self care skills, impaired functional mobility, gait instability, impaired balance, visual deficits, impaired cognition, decreased coordination, decreased upper extremity function, decreased lower extremity function, decreased safety awareness, pain, decreased ROM, impaired fine motor, impaired skin, edema, impaired cardiopulmonary response to activity, impaired joint extensibility, impaired muscle length.    Pt demonstrated improved functional performance with toileting as noted by mod assist in which patient able to perform clothing management with steadying assist requiring most assist with perineal hygiene with additional tactile cueing for technique.     General Precautions: Standard, deaf, fall     Orthopedic Precautions:LLE weight bearing as tolerated     Braces: N/A    Rehab Prognosis: Good; patient would benefit from acute skilled OT services to address these deficits and reach maximum level of function.      History:     Past Medical History:   Diagnosis Date    Anticoagulant long-term use     Deaf     History of DVT (deep vein thrombosis)     Primary osteoarthritis of left knee        Past Surgical History:   Procedure Laterality Date    CHOLECYSTECTOMY      KNEE SURGERY      TOTAL KNEE ARTHROPLASTY Left 1/11/2024    Procedure: ARTHROPLASTY, KNEE, TOTAL;  Surgeon: Phoenix Godoy MD;  Location: [facility];  Service: Orthopedics;  Laterality: Left;       Subjective     Orientation: Oriented x4    Chief Complaint: weakness and pain     Patient/Family Comments/goals: Pt would like to improve her overall independence with ADL's including functional mobility.       Respiratory Status: Room  air    Patients cultural, spiritual, Mandaen conflicts given the current situation: no       Objective:     Patient found up in chair  upon OT entry to room.    Mobility   Patient completed:  Sit to Stand Transfer with minimum assistance with rolling walker  Bed to Chair Transfer using Step Transfer technique with minimum assistance with rolling walker  Toilet Transfer Step Transfer technique with minimum assistance with  grab bars    ADLs   Current Status   Eating     Oral Hygiene     Shower, Bathe Self     Upper Body Dressing     Lower Body Dressing     Toileting Hygiene 3   Toilet Transfer 3   Putting On, Taking Off Footwear       Limiting Factors for ADLs: endurance, limited ROM, balance, weakness, body habitus, coordination, cognition, safety awareness, and pain     Therapeutic Activities  Pt participated in therapeutic activity addressing trunk mobility, trunk control, dynamic sitting balance, and trunk strength utilizing large stability ball challenging her to perform trunk flexion, extension, and lateral flexion requiring verbal and tactile cueing to facilitate optimal movement patterns and increased range per trial in order to improve active ROM impacting performance with functional tasks below waist.     Therapeutic Exercise  Pt participated in therapeutic exercise performing 5x12 seated pushups requiring verbal and tactile cueing for technique challenging her to lift buttocks off seated surface to end range elbow extension in order to strengthen triceps brachii to improve performance with sit <> stand transitions particularly from lower surfaces. Pt then participated in therapeutic exercise performing 3x12 bilateral UE proximal strengthening exercises utilizing heavy resistance theraband with scapular retraction, shoulder extension, shoulder adduction, horizontal abduction, shoulder flexion in plane of scaption, internal rotation and external rotation requiring verbal and tactile cueing for technique  while emphasizing quality of movement.        Additional Treatments: Pt participated in ADL retraining as further noted above emphasizing use of adaptive equipment, assistive devices, and compensatory strategies providing much extra time with demonstration requiring verbal and tactile cueing for safety awareness and technique.      LifeStyle Change and Education:             Patient left up in chair with  nurse notified.     Education provided: Roles and goals of OT, ADLs, transfer training, bed mobility, body mechanics, assistive device, wheelchair precautions, safety precautions, fall prevention, post-op precautions, equipment recommendations, and home safety    Multidisciplinary Problems       Occupational Therapy Goals          Problem: Occupational Therapy    Goal Priority Disciplines Outcome Interventions   Occupational Therapy Goal     OT, PT/OT Ongoing, Progressing    Description:  Long Term Goals to be met by: 01/30/24     Patient will increase functional independence with ADLs by performing:    Feeding with Carter.  UE Dressing with Modified Carter.  LE Dressing with Supervision.  Grooming while seated at sink with Modified Carter.  Toileting from toilet with Set-up Assistance for hygiene and clothing management.   Bathing from  shower chair/bench with Set-up Assistance.  Toilet transfer to toilet with Set-up Assistance.  Increased functional strength to 4+/5 to 5/5 for bilateral UE's.                         Time Tracking     OT Received On: 01/18/24  Time In 1100     Time Out 1230  Total Time 90 min  Therapy Time: OT Individual: 60  OT Concurrent: 30  Missed Time:    Missed Time Reason:      Billable Minutes: Self Care/Home Management 30, Therapeutic Activity 15, and Therapeutic Exercise 45    01/18/2024

## 2024-01-18 NOTE — PLAN OF CARE
Problem: Physical Therapy  Goal: Physical Therapy Goal  Description:   Short Term Goals: 2024   Long Term Goals: 2024     Transfers Status    Sit to Stand  Short Term Goal: Complete sit to stand with Stand-by Assistance using Rolling Walker with minimal  verbal cues  Long Term Goal:  Complete sit to stand with Supervision or Set-up Assistance using Rolling Walker     Stand Step  Short Term Goal: Complete stand step with  Stand-by Assistance  using Rolling Walker with minimal   verbal cues  Long Term Goal:  Complete stand step with  Supervision or Set-up Assistance  using Rolling Walker     Supine <> Sit    Long Term Goal: Complete supine <> sit with Supervision or Set-up Assistance      Rolling Right/Left  Long Term Goal: Complete rolling  right/left with Supervision or Set-up Assistance     Balance/Coordination    Static Sitting  Long Term Goal: Complete static sitting with Modified Independent      Dynamic Sitting  Long Term Goal: Complete dynamic sitting with Supervision or Set-up Assistance  with  minimal  verbal cues minimal excursions    Static Standing  Short Term Goal: Complete static standing with  Stand-by Assistance with  minimal  verbal cues and RW  Long Term Goal: Complete static standing with Supervision or Set-up Assistance  with   minimal   verbal cues and RW    Dynamic Standing  Short Term Goal: Complete dynamic standing with Stand-by Assistance  with minimal   verbal cues minimal  excursions with RW  Long Term Goal: Complete dynamic standing with Supervision or Set-up Assistance with minimal   verbal cues and minimal  excursions with RW    Endurance    Short Term Goal:   Physical Therapy: 2 times a day, 30-45 minutes  Rest periods: multiple  Sitting: 3 hours/day    Long Term Goal:  Physical Therapy: 2 times a day, 45 minutes  Rest periods: minimal  Sittin-8 hours/day    Mobility/Gait  Short Term Goal: Will ambulate with Stand-by Assistance  Using Rolling Walker with minimal   verbal  cues x 100 ft  Long Term Goal: Will ambulate with Stand-by Assistance  using Rolling Walker with minimal  verbal cues x 150 ft    Stairs Assistance  Long Term Goal: Patient will ascend/descend 4 stairs/steps using both  handrails  nonreciprocal steps with Minimal Assistance and minimal  verbal cues    Wheelchair Skills/Surface  Long Term Goal: Patient will propel Standard wheelchair x 300 feet with Level tileWITH Bilateral upper extremity with Modified Independent       Ramp/Uneven 10 feet surface  Long  Term Goal; Patient will be able to navigate a 10 inch uneven surface with proper A.D. with  contact guard assistance/stand by assistance       2-6  inch curb  Long  Term Goal; Patient will be able to navigate a  2  inch curb with RW with  minimal assistance     Picking up object   Long  Term Goal; Patient will be able to  a small object from the floor  with a RW . with  stand by assistance     Car transfers  Long  Term Goal; Patient will be able to get in and out of a vehicle  with RW  with  minimal assistance     Education  Long Term Goals:  Patient/family/caregivers trained on physical mobility and precautions with Supervision.    Patient/family/caregivers trained on safety awareness with Supervision.    Order and obtain all appropriate equipment.     Outcome: Ongoing, Progressing

## 2024-01-18 NOTE — SUBJECTIVE & OBJECTIVE
Past Medical History:   Diagnosis Date    Anticoagulant long-term use     Deaf     History of DVT (deep vein thrombosis)     Primary osteoarthritis of left knee        Past Surgical History:   Procedure Laterality Date    CHOLECYSTECTOMY      KNEE SURGERY      TOTAL KNEE ARTHROPLASTY Left 1/11/2024    Procedure: ARTHROPLASTY, KNEE, TOTAL;  Surgeon: Phoenix Godoy MD;  Location: [facility];  Service: Orthopedics;  Laterality: Left;       Review of patient's allergies indicates:  No Known Allergies    Current Facility-Administered Medications on File Prior to Encounter   Medication    methylPREDNISolone acetate injection 40 mg     Current Outpatient Medications on File Prior to Encounter   Medication Sig    acetaminophen (TYLENOL) 500 MG tablet Take 2 tablets (1,000 mg total) by mouth every 8 (eight) hours as needed for Pain.    diclofenac (VOLTAREN) 75 MG EC tablet Take 1 tablet (75 mg total) by mouth 2 (two) times daily.    gabapentin (NEURONTIN) 300 MG capsule Take 1 capsule (300 mg total) by mouth 3 (three) times daily.    methocarbamoL (ROBAXIN) 750 MG Tab Take 1 tablet (750 mg total) by mouth 3 (three) times daily as needed (Pain not relieved with Tylenol, diclofenac, or gabapentin).    oxyCODONE (ROXICODONE) 5 MG immediate release tablet Take 1 tablet (5 mg total) by mouth every 4 (four) hours as needed for Pain (pain not relieved by all other medications).    rivaroxaban (XARELTO) 20 mg Tab Take 20 mg by mouth.    ziprasidone (GEODON) 40 MG Cap TAKE 1 CAPSULE BY MOUTH DAILY AT BEDTIME TAKE WITH FOOD     Family History    None       Tobacco Use    Smoking status: Never    Smokeless tobacco: Current   Substance and Sexual Activity    Alcohol use: Never    Drug use: Never    Sexual activity: Not Currently     Review of Systems   Constitutional:  Positive for activity change and fatigue. Negative for appetite change, chills, diaphoresis, fever and unexpected weight change.   HENT:  Positive for hearing loss.  Negative for congestion, dental problem, drooling, facial swelling, mouth sores, nosebleeds, sore throat and trouble swallowing.    Eyes:  Negative for visual disturbance.   Respiratory:  Negative for cough, shortness of breath and wheezing.    Cardiovascular:  Negative for chest pain, palpitations and leg swelling.   Gastrointestinal:  Positive for diarrhea. Negative for abdominal distention, abdominal pain, blood in stool, constipation, nausea and vomiting.   Endocrine: Negative for polyphagia.   Genitourinary:  Negative for difficulty urinating, dysuria, flank pain and frequency.   Musculoskeletal:  Positive for arthralgias, gait problem, joint swelling and myalgias. Negative for back pain.   Skin:  Negative for rash.        Surgical incision site L knee   Allergic/Immunologic: Negative.    Neurological:  Positive for speech difficulty and weakness. Negative for dizziness, tremors, seizures, syncope, facial asymmetry, light-headedness, numbness and headaches.   Hematological:  Negative for adenopathy. Bruises/bleeds easily.   Psychiatric/Behavioral:  Negative for agitation, confusion and hallucinations. The patient is not nervous/anxious.      Objective:     Vital Signs (Most Recent):  Temp: 97.9 °F (36.6 °C) (01/18/24 0514)  Pulse: 73 (01/18/24 0514)  Resp: 20 (01/18/24 0514)  BP: 124/60 (01/18/24 0514)  SpO2: 98 % (01/18/24 0514) Vital Signs (24h Range):  Temp:  [97.9 °F (36.6 °C)-98 °F (36.7 °C)] 97.9 °F (36.6 °C)  Pulse:  [73-82] 73  Resp:  [18-20] 20  SpO2:  [98 %-99 %] 98 %  BP: (117-124)/(59-60) 124/60     Weight: 94.4 kg (208 lb 1.8 oz)  Body mass index is 35.72 kg/m².     Physical Exam  Vitals and nursing note reviewed.   Constitutional:       General: She is awake. She is not in acute distress.     Appearance: Normal appearance. She is obese. She is not ill-appearing, toxic-appearing or diaphoretic.   HENT:      Head: Normocephalic and atraumatic.      Right Ear: External ear normal. Decreased hearing  noted.      Left Ear: External ear normal. Decreased hearing noted.      Nose: Nose normal. No congestion or rhinorrhea.      Mouth/Throat:      Mouth: Mucous membranes are moist.      Pharynx: Oropharynx is clear. No oropharyngeal exudate or posterior oropharyngeal erythema.   Eyes:      General: No scleral icterus.        Right eye: No discharge.         Left eye: No discharge.      Extraocular Movements: Extraocular movements intact.      Conjunctiva/sclera: Conjunctivae normal.      Pupils: Pupils are equal, round, and reactive to light.   Neck:      Thyroid: No thyroid mass or thyromegaly.      Vascular: No carotid bruit.      Meningeal: Brudzinski's sign and Kernig's sign absent.   Cardiovascular:      Rate and Rhythm: Normal rate and regular rhythm.      Chest Wall: PMI is not displaced. No thrill.      Pulses: Normal pulses.      Heart sounds: Normal heart sounds. No murmur heard.     No friction rub. No gallop.   Pulmonary:      Effort: Pulmonary effort is normal. No tachypnea, accessory muscle usage, prolonged expiration or respiratory distress.      Breath sounds: Normal breath sounds. No stridor or decreased air movement. No wheezing, rhonchi or rales.   Chest:      Chest wall: No tenderness.   Abdominal:      General: Bowel sounds are normal. There is no distension.      Palpations: Abdomen is soft. There is no hepatomegaly, splenomegaly or mass.      Tenderness: There is no abdominal tenderness. There is no right CVA tenderness, left CVA tenderness, guarding or rebound.      Hernia: No hernia is present.   Musculoskeletal:         General: Tenderness present. No swelling or deformity.      Cervical back: Neck supple. No rigidity. No muscular tenderness.      Right lower leg: No edema.      Left lower leg: Edema present.      Comments: Surgical site clear   Lymphadenopathy:      Cervical: No cervical adenopathy.   Skin:     General: Skin is warm.      Capillary Refill: Capillary refill takes less than 2  seconds.      Coloration: Skin is not cyanotic, jaundiced or pale.      Findings: No erythema, petechiae or rash.   Neurological:      Mental Status: She is alert and oriented to person, place, and time. Mental status is at baseline.      Cranial Nerves: Cranial nerve deficit present. No dysarthria or facial asymmetry.      Motor: Weakness present. No tremor.      Gait: Gait abnormal.   Psychiatric:         Mood and Affect: Mood normal. Mood is not anxious or depressed. Affect is not flat.         Speech: Speech is not rapid and pressured or slurred.         Behavior: Behavior normal. Behavior is not agitated, aggressive or combative.         Thought Content: Thought content normal. Thought content is not paranoid or delusional.         Cognition and Memory: Cognition is not impaired. Memory is not impaired.      Comments: + learning impairment, + deaf              Significant Imaging: I have reviewed all pertinent imaging results/findings within the past 24 hours.

## 2024-01-18 NOTE — PLAN OF CARE
Problem: Occupational Therapy  Goal: Occupational Therapy Goal  Description:  Long Term Goals to be met by: 01/30/24     Patient will increase functional independence with ADLs by performing:    Feeding with [place].  UE Dressing with Modified [place].  LE Dressing with Supervision.  Grooming while seated at sink with Modified [place].  Toileting from toilet with Set-up Assistance for hygiene and clothing management.   Bathing from  shower chair/bench with Set-up Assistance.  Toilet transfer to toilet with Set-up Assistance.  Increased functional strength to 4+/5 to 5/5 for bilateral UE's.    Outcome: Ongoing, Progressing

## 2024-01-18 NOTE — ASSESSMENT & PLAN NOTE
Chronic, controlled. Latest blood pressure and vitals reviewed-     Temp:  [97.9 °F (36.6 °C)-98 °F (36.7 °C)]   Pulse:  [73-82]   Resp:  [18-20]   BP: (117-124)/(59-60)   SpO2:  [98 %-99 %] .   Home meds for hypertension were reviewed and noted below.       While in the hospital, will manage blood pressure as follows; Continue home antihypertensive regimen    Will utilize p.r.n. blood pressure medication only if patient's blood pressure greater than 140/90 and she develops symptoms such as worsening chest pain or shortness of breath.

## 2024-01-18 NOTE — PLAN OF CARE
No issues reported over night. Will continue to monitor.  Problem: Rehabilitation (IRF) Plan of Care  Goal: Plan of Care Review  Outcome: Ongoing, Progressing  Goal: Patient-Specific Goal (Individualized)  Outcome: Ongoing, Progressing  Goal: Absence of New-Onset Illness or Injury  Outcome: Ongoing, Progressing  Goal: Optimal Comfort and Wellbeing  Outcome: Ongoing, Progressing  Goal: Readiness for Transition of Care  Outcome: Ongoing, Progressing     Problem: Bariatric Environmental Safety  Goal: Safety Maintained with Care  Outcome: Ongoing, Progressing     Problem: Fall Injury Risk  Goal: Absence of Fall and Fall-Related Injury  Outcome: Ongoing, Progressing     Problem: Skin Injury Risk Increased  Goal: Skin Health and Integrity  Outcome: Ongoing, Progressing     Problem: Pain Acute  Goal: Acceptable Pain Control and Functional Ability  Outcome: Ongoing, Progressing     Problem: Surgical Site Infection  Goal: Absence of Infection Signs and Symptoms  Outcome: Ongoing, Progressing     Problem: Mobility Impairment  Goal: Optimal Mobility  Outcome: Ongoing, Progressing

## 2024-01-18 NOTE — PROGRESS NOTES
[facility] Medicine  Progress Note    Patient Name: Lupe Santizo  MRN: [id number]  Patient Class: IP- Rehab   Admission Date: 1/16/2024  Length of Stay: 2 days  Attending Physician: Ricardo Mckeon III, MD  Primary Care Provider: Alicia, Primary Doctor        Subjective:     Principal Problem:Status post total left knee replacement        HPI:  History of present illness:      Lupe Santizo is a 43 y.o. female who presents for evaluation of their left knee pain. Mother reports daughter's pain has been present for years. Patient is both unable to hear and unable to speak, therefore history is from mother who is also MPOA. Patient also has a learning disability. Mother reports that the knee is the primary source of pain for the patient. Pain is mostly located medially. She had an injection at her last visit which gave her temporarily relief. She is on Xarelto for a DVT following gallbladder surgery, which she will be on for life. Patient returns today for f/u of L knee OA. She has since obtained clearance form her PCP and cardiologist which shows that she is low to intermediate risk and is optimized for surgery and okay to hold Xarelto 3 days preop that she is a high risk for recurrent DVT. She would like to proceed with TKA.      Patient underwent a successful left total knee arthroplasty and postoperatively her recovery has been uneventful.  Her care team reached out to us because of her learning impairment and communication challenges and felt that it was safest for her to recover and an inpatient rehab setting to avoid further complications and morbidity.  I have seen and evaluated the patient this morning we are communicating through a tablet patient is smiling her mood seems to be good she is complaining of pain at the knee her surgical site seems clear her dressing does have some serosanguineous discharge.  Patient's chart has been reviewed and reconciled all medications updated.      Pt. Requires acute inpatient rehab admission with 24-hour nursing and active physician oversight to monitor and manage acute medical comorbid conditions, labs, pain, and functional deficits. Patient/family will also require teaching and integration of improving functional skills into daily living. She will also require an individualized, interdisciplinary approach to her care, receiving PT, OT services 3 hours per day, 5 days per week. Required care cannot be provided at a lower level of care. Patient is anticipated to require approximately 10-14 days LOS with expected discharge home with mother and with      Impairment group (IGC):   Unilateral Kneee Replacements 08.61 Etiologic diagnosis/description:   M17.12: Osteoarthritis of left knee   Date of onset:  1/11/2024 Date of surgery:  1/11/2024   Allergies: Patient has no known allergies.   Comorbid condition: Deaf, hx of CVA, unable to speak, hx of DVT, OA   Medical/functional conditions requiring inpatient rehabilitation:      This patient requires medical management/24-hour nursing of complex co morbidities Deaf, hx of CVA, unable to speak, hx of DVT, OA, labs, medications (see medications list), pain, sleep hygiene, anticoagulation, nutrition, hydration, neurological,  and preventive healthcare.     This patient requires intense therapy and an integrated, interdisciplinary approach to address safety, impaired mobility, impaired ADLs (dressing, toileting, grooming, showering), judgment, and memory, communication, bowel/bladder problems,preventive healthcare, medication management, integration of functional skills into daily living, and home caregiver support and training.      Risk for medical/clinical complications:      This patient is at risk for the following complications: DVT/PE, pneumonia, malnutrition, worsening activity intolerance, complications from anticoagulation,  skin breakdown, inadequate sleep, recurring stroke, and constipation.         Overview/Hospital Course:  01/18 CP: Pt c/o pain/soreness after working with therapy. Also reports loose stools. Med orders placed. Pt is in good spirits and working hard with PT this am. Encouraged good therapy efforts. Unable to visualize surgical site today.    Past Medical History:   Diagnosis Date    Anticoagulant long-term use     Deaf     History of DVT (deep vein thrombosis)     Primary osteoarthritis of left knee        Past Surgical History:   Procedure Laterality Date    CHOLECYSTECTOMY      KNEE SURGERY      TOTAL KNEE ARTHROPLASTY Left 1/11/2024    Procedure: ARTHROPLASTY, KNEE, TOTAL;  Surgeon: Phoenix Godoy MD;  Location: [facility];  Service: Orthopedics;  Laterality: Left;       Review of patient's allergies indicates:  No Known Allergies    Current Facility-Administered Medications on File Prior to Encounter   Medication    methylPREDNISolone acetate injection 40 mg     Current Outpatient Medications on File Prior to Encounter   Medication Sig    acetaminophen (TYLENOL) 500 MG tablet Take 2 tablets (1,000 mg total) by mouth every 8 (eight) hours as needed for Pain.    diclofenac (VOLTAREN) 75 MG EC tablet Take 1 tablet (75 mg total) by mouth 2 (two) times daily.    gabapentin (NEURONTIN) 300 MG capsule Take 1 capsule (300 mg total) by mouth 3 (three) times daily.    methocarbamoL (ROBAXIN) 750 MG Tab Take 1 tablet (750 mg total) by mouth 3 (three) times daily as needed (Pain not relieved with Tylenol, diclofenac, or gabapentin).    oxyCODONE (ROXICODONE) 5 MG immediate release tablet Take 1 tablet (5 mg total) by mouth every 4 (four) hours as needed for Pain (pain not relieved by all other medications).    rivaroxaban (XARELTO) 20 mg Tab Take 20 mg by mouth.    ziprasidone (GEODON) 40 MG Cap TAKE 1 CAPSULE BY MOUTH DAILY AT BEDTIME TAKE WITH FOOD     Family History    None       Tobacco Use    Smoking status: Never    Smokeless tobacco: Current   Substance and Sexual Activity    Alcohol use:  Never    Drug use: Never    Sexual activity: Not Currently     Review of Systems   Constitutional:  Positive for activity change and fatigue. Negative for appetite change, chills, diaphoresis, fever and unexpected weight change.   HENT:  Positive for hearing loss. Negative for congestion, dental problem, drooling, facial swelling, mouth sores, nosebleeds, sore throat and trouble swallowing.    Eyes:  Negative for visual disturbance.   Respiratory:  Negative for cough, shortness of breath and wheezing.    Cardiovascular:  Negative for chest pain, palpitations and leg swelling.   Gastrointestinal:  Positive for diarrhea. Negative for abdominal distention, abdominal pain, blood in stool, constipation, nausea and vomiting.   Endocrine: Negative for polyphagia.   Genitourinary:  Negative for difficulty urinating, dysuria, flank pain and frequency.   Musculoskeletal:  Positive for arthralgias, gait problem, joint swelling and myalgias. Negative for back pain.   Skin:  Negative for rash.        Surgical incision site L knee   Allergic/Immunologic: Negative.    Neurological:  Positive for speech difficulty and weakness. Negative for dizziness, tremors, seizures, syncope, facial asymmetry, light-headedness, numbness and headaches.   Hematological:  Negative for adenopathy. Bruises/bleeds easily.   Psychiatric/Behavioral:  Negative for agitation, confusion and hallucinations. The patient is not nervous/anxious.      Objective:     Vital Signs (Most Recent):  Temp: 97.9 °F (36.6 °C) (01/18/24 0514)  Pulse: 73 (01/18/24 0514)  Resp: 20 (01/18/24 0514)  BP: 124/60 (01/18/24 0514)  SpO2: 98 % (01/18/24 0514) Vital Signs (24h Range):  Temp:  [97.9 °F (36.6 °C)-98 °F (36.7 °C)] 97.9 °F (36.6 °C)  Pulse:  [73-82] 73  Resp:  [18-20] 20  SpO2:  [98 %-99 %] 98 %  BP: (117-124)/(59-60) 124/60     Weight: 94.4 kg (208 lb 1.8 oz)  Body mass index is 35.72 kg/m².     Physical Exam  Vitals and nursing note reviewed.   Constitutional:        General: She is awake. She is not in acute distress.     Appearance: Normal appearance. She is obese. She is not ill-appearing, toxic-appearing or diaphoretic.   HENT:      Head: Normocephalic and atraumatic.      Right Ear: External ear normal. Decreased hearing noted.      Left Ear: External ear normal. Decreased hearing noted.      Nose: Nose normal. No congestion or rhinorrhea.      Mouth/Throat:      Mouth: Mucous membranes are moist.      Pharynx: Oropharynx is clear. No oropharyngeal exudate or posterior oropharyngeal erythema.   Eyes:      General: No scleral icterus.        Right eye: No discharge.         Left eye: No discharge.      Extraocular Movements: Extraocular movements intact.      Conjunctiva/sclera: Conjunctivae normal.      Pupils: Pupils are equal, round, and reactive to light.   Neck:      Thyroid: No thyroid mass or thyromegaly.      Vascular: No carotid bruit.      Meningeal: Brudzinski's sign and Kernig's sign absent.   Cardiovascular:      Rate and Rhythm: Normal rate and regular rhythm.      Chest Wall: PMI is not displaced. No thrill.      Pulses: Normal pulses.      Heart sounds: Normal heart sounds. No murmur heard.     No friction rub. No gallop.   Pulmonary:      Effort: Pulmonary effort is normal. No tachypnea, accessory muscle usage, prolonged expiration or respiratory distress.      Breath sounds: Normal breath sounds. No stridor or decreased air movement. No wheezing, rhonchi or rales.   Chest:      Chest wall: No tenderness.   Abdominal:      General: Bowel sounds are normal. There is no distension.      Palpations: Abdomen is soft. There is no hepatomegaly, splenomegaly or mass.      Tenderness: There is no abdominal tenderness. There is no right CVA tenderness, left CVA tenderness, guarding or rebound.      Hernia: No hernia is present.   Musculoskeletal:         General: Tenderness present. No swelling or deformity.      Cervical back: Neck supple. No rigidity. No muscular  tenderness.      Right lower leg: No edema.      Left lower leg: Edema present.      Comments: Surgical site clear   Lymphadenopathy:      Cervical: No cervical adenopathy.   Skin:     General: Skin is warm.      Capillary Refill: Capillary refill takes less than 2 seconds.      Coloration: Skin is not cyanotic, jaundiced or pale.      Findings: No erythema, petechiae or rash.   Neurological:      Mental Status: She is alert and oriented to person, place, and time. Mental status is at baseline.      Cranial Nerves: Cranial nerve deficit present. No dysarthria or facial asymmetry.      Motor: Weakness present. No tremor.      Gait: Gait abnormal.   Psychiatric:         Mood and Affect: Mood normal. Mood is not anxious or depressed. Affect is not flat.         Speech: Speech is not rapid and pressured or slurred.         Behavior: Behavior normal. Behavior is not agitated, aggressive or combative.         Thought Content: Thought content normal. Thought content is not paranoid or delusional.         Cognition and Memory: Cognition is not impaired. Memory is not impaired.      Comments: + learning impairment, + deaf              Significant Imaging: I have reviewed all pertinent imaging results/findings within the past 24 hours.    Assessment/Plan:      * Status post total left knee replacement  Patient's pain seems to be fairly well controlled postoperatively agree with current regimen and adjust as needed.  PT/OT evaluations reviewed.      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      Essential hypertension  Chronic, controlled. Latest blood pressure and vitals reviewed-     Temp:  [97.9 °F (36.6 °C)-98 °F (36.7 °C)]   Pulse:  [73-82]   Resp:  [18-20]   BP: (117-124)/(59-60)   SpO2:  [98 %-99 %] .    Home meds for hypertension were reviewed and noted below.       While in the hospital, will manage blood pressure as follows; Continue home antihypertensive regimen    Will utilize p.r.n. blood pressure medication only if patient's blood pressure greater than 140/90 and she develops symptoms such as worsening chest pain or shortness of breath.    Bilateral deafness  Patient able to communicate with tablet and has some basic reading and writing skills.      Obesity (BMI 30-39.9)  Body mass index is 35.72 kg/m². Morbid obesity complicates all aspects of disease management from diagnostic modalities to treatment. Weight loss encouraged and health benefits explained to patient.         Primary osteoarthritis of left knee  Patient's pain seems to be fairly well controlled postoperatively agree with current regimen and adjust as needed.        VTE Risk Mitigation (From admission, onward)           Ordered     rivaroxaban tablet 20 mg  with dinner         01/16/24 1435     IP VTE LOW RISK PATIENT  Once         01/16/24 1435     Place sequential compression device  Until discontinued         01/16/24 1435                    Discharge Planning   FRED:      Code Status: Full Code   Is the patient medically ready for discharge?:     Reason for patient still in hospital (select all that apply): Patient trending condition, Laboratory test, Treatment, and PT / OT recommendations  Discharge Plan A: Home with family                  CARMELO WOOTEN  Department of Hospital Medicine   [facility] ([facility])

## 2024-01-18 NOTE — PLAN OF CARE
Problem: Rehabilitation (IRF) Plan of Care  Goal: Plan of Care Review  Outcome: Ongoing, Progressing  Goal: Patient-Specific Goal (Individualized)  Outcome: Ongoing, Progressing  Goal: Absence of New-Onset Illness or Injury  Outcome: Ongoing, Progressing  Goal: Optimal Comfort and Wellbeing  Outcome: Ongoing, Progressing  Goal: Readiness for Transition of Care  Outcome: Ongoing, Progressing     Problem: Bariatric Environmental Safety  Goal: Safety Maintained with Care  Outcome: Ongoing, Progressing     Problem: Fall Injury Risk  Goal: Absence of Fall and Fall-Related Injury  Outcome: Ongoing, Progressing     Problem: Skin Injury Risk Increased  Goal: Skin Health and Integrity  Outcome: Ongoing, Progressing     Problem: Pain Acute  Goal: Acceptable Pain Control and Functional Ability  Outcome: Ongoing, Progressing     Problem: Surgical Site Infection  Goal: Absence of Infection Signs and Symptoms  Outcome: Ongoing, Progressing     Problem: Mobility Impairment  Goal: Optimal Mobility  Outcome: Ongoing, Progressing

## 2024-01-18 NOTE — HOSPITAL COURSE
01/18 CP: Pt c/o pain/soreness after working with therapy. Also reports loose stools. Med orders placed. Pt is in good spirits and working hard with PT this am. Encouraged good therapy efforts. Unable to visualize surgical site today.  1/19 DL:  Patient doing well.  No acute events reported.  Labs and vital signs stable.  Patient doing well with therapy.  Encouraged to continue great therapy efforts.  1/21 [place] weekend crossover. No reported concerns with therapy efforts.   1/22 DL:  Patient doing well.  She is sitting in dining room and is awake, alert, oriented.  Vital signs stable.  Patient reports mild pain that is well controlled with current p.r.n. medications.  Surgical dressing to left knee dry and intact.  We have contacted patient's orthopedic surgeon who states patient has appointment 1/29.  Surgeon states he will remove dressing and staples at that appointment.  Patient continues to tolerate therapy well.  Encouraged to keep up great therapy efforts.  1/23 FM:  Patient seen and examined after team conference, patient's mood is good she is smiling patient has regained function and getting close to her baseline.  May discharge home before the weekend as she has improved more rapidly than expected.  1/24 DL:  Patient continues to do well.  No acute events reported.  Labs and vital signs stable.  Patient continues to be highly motivated and is working well with therapy.  Patient encouraged to continue great therapy efforts.  1/25:  Patient continues to do well.  She is sitting up in wheelchair in dining room and is awake, alert, oriented.  No acute events reported.  No acute distress noted.  Patient currently working with family and occupational therapist for home training.  Labs and vital signs stable.  Patient continues to work well with therapy.  Encouraged patient to keep up great therapy efforts.    Discharge Note:  Patient transitioned well into the inpatient rehab unit and was very comfortable with our  staff and rehabilitative care.  Patient has a learning and communication barrier but was able to put in good effort and make functional gains.  Patient's dressing was left intact for her surgeon to remove patient participated and made her daily hours in therapy and overall improved with our team functionally.  Patient's safety strength range of motion all improved and there were no significant complications during this stay.

## 2024-01-18 NOTE — PT/OT/SLP PROGRESS
Physical Therapy Inpatient Rehab Treatment    Patient Name:  Lupe Santizo   MRN:  [id number]    Recommendations:     Discharge Recommendations:  Low Intensity Therapy   Discharge Equipment Recommendations: walker, rolling, bedside commode, bath bench   Barriers to discharge: None    Assessment:     Lupe Santizo is a 43 y.o. female admitted with a medical diagnosis of Status post total left knee replacement.  She presents with the following impairments/functional limitations:      weakness, pain, cognitive deficits, impaired coordination, impaired balance, gait deviations, difficulty with functional transfers, difficulty with bed mobility, difficulty with wheelchair propulsion, impaired sensation, impaired proprioception, decreased motor control, decreased safety, and decreased endurance.Patient is deaf, she communicates via writing. Patient presents with 0/5 strength on the right ankle dorsiflexors, decrease active ROM on both ankle (can go to neutral only), hyperextension of the right knee passively done. Absent sensation on the right leg.     Patient tolerated treatment with tolerable amount of left knee pain, multiple rest breaks needed during gait training.  Increase knee extension noted while supine, lacking 25 degrees actively  and lacking 21 degrees passively towards neutral extension.     Rehab Diagnosis:   Left TKA, WBAT     Recent Surgery: * No surgery found *      General Precautions: Standard, deaf, fall     Orthopedic Precautions:LLE weight bearing as tolerated     Braces: N/A    Rehab Prognosis: Fair; patient would benefit from acute skilled PT services to address these deficits and reach maximum level of function.      History:     Past Medical History:   Diagnosis Date    Anticoagulant long-term use     Deaf     History of DVT (deep vein thrombosis)     Primary osteoarthritis of left knee        Past Surgical History:   Procedure Laterality Date    CHOLECYSTECTOMY      KNEE SURGERY      TOTAL  KNEE ARTHROPLASTY Left 1/11/2024    Procedure: ARTHROPLASTY, KNEE, TOTAL;  Surgeon: Phoenix Godoy MD;  Location: [facility];  Service: Orthopedics;  Laterality: Left;       Subjective     Chief Complaint: Patient wrote knee pain on the dry erase board..     Respiratory Status: Room air    Patients cultural, spiritual, Jew conflicts given the current situation: no      Objective:     Communicated with nurse and patient  prior to session.  Patient found up in chair with    upon PT entry to room.    Pt is Oriented x3 and Oriented to place and Alert and Cooperative.    Vitals   Vitals at Rest  /61 mmHg    HR 82 bpm    O2 Sat 96%    Pain Left knee, did not quantify          Functional Mobility:   Bed Mobility:     Rolling Left:  Supervision or touching assistance   Rolling Right: Supervision or touching assistance   Scooting: Supervision or touching assistance   Supine to Sit: Supervision or touching assistance   Sit to Supine: Supervision or touching assistance   Transfers:     Sit to Stand: Supervision or touching assistance  with rolling walker  Chair to mat: Supervision or touching assistance  with  rolling walker  using  Step Transfer  Gait: Pt ambulates 14 feet, 11 feet, 5 feet, 26 feet, 28 feet, 29 feet and 17 feet with RW with Partial/moderate assistance .   Balance: Static Sitting/Standing  Patient performed static standing on level surface using rolling walker with Supervision or touching assistance  and minimal verbal cues.    Static Sit: GOOD: Takes MODERATE challenges from all directions  Static Stand: FAIR: Maintains without assist but unable to take challenges     object from ground in standing position with reacher with rolling walker with Supervision or touching assistance   Wheelchair Propulsion:  Pt propelled Standard wheelchair x 300 feet on Level tile with  Bilateral upper extremity with Set-up or clean-up assistance .      Current   Status  Discharge   Goal   Functional Area: Care  Score:    Roll Left and Right 4 Set-up/clean-up   Sit to Lying 4 Set-up/clean-up   Lying to Sitting on Side of Bed 4 Set-up/clean-up   Sit to Stand 4 Set-up/clean-up   Chair/Bed-to-Chair Transfer 4 Set-up/clean-up   Car Transfer 10 Supervision or touching assistance   Walk 10 Feet 3 Supervision or touching assistance   Walk 50 Feet with Two Turns 88 Supervision or touching assistance   Walk 150 Feet 88 Supervision or touching assistance   Walk 10 Feet Uneven Surface 88 Supervision or touching assistance   1 Step (Curb) 88 Supervision or touching assistance   4 Steps 88 Supervision or touching assistance   12 Steps 88 Not applicable   Picking Up Object 4 Set-up/clean-up   Wheel 50 Feet with Two Turns 5 Independent   Wheel 150 Feet 5 Independent       Therapeutic Activities and Exercises:  Wheelchair mobility using both UE  Gait training with RW on flat level surface   Picking up a small object from the floor   Sit <> stand with both UE support  Stand step transfer with a RW   Sit <> supine  Standing balance   Patient education on DME needs, family training, care giver assistance and follow up therapy    SUPINE Left Lower Extremity   Active assisted  ROM Sets / Reps / Resistance / Etc.   Ankle PF and DF 3 x 10   Short Arc Quads 3 x 10    Quad Sets 3 x 10      Stretching of left knee towards extension with overpressure at end range as tolerated       Activity Tolerance: Good and Fair    Patient left up in chair with call button in reach, nurse  notified, and OT  present.    Education provided: roles and goals of PT/PTA, transfer training, bed mob, gait training, balance training, safety awareness, assistive device, wheelchair management, and strengthening exercises    Expected compliance: Moderate compliance    GOALS:   Multidisciplinary Problems       Physical Therapy Goals          Problem: Physical Therapy    Goal Priority Disciplines Outcome Goal Variances Interventions   Physical Therapy Goal     PT, PT/OT  Ongoing, Progressing     Description:   Short Term Goals: 2024   Long Term Goals: 2024     Transfers Status    Sit to Stand  Short Term Goal: Complete sit to stand with Stand-by Assistance using Rolling Walker with minimal  verbal cues  Long Term Goal:  Complete sit to stand with Supervision or Set-up Assistance using Rolling Walker     Stand Step  Short Term Goal: Complete stand step with  Stand-by Assistance  using Rolling Walker with minimal   verbal cues  Long Term Goal:  Complete stand step with  Supervision or Set-up Assistance  using Rolling Walker     Supine <> Sit    Long Term Goal: Complete supine <> sit with Supervision or Set-up Assistance      Rolling Right/Left  Long Term Goal: Complete rolling  right/left with Supervision or Set-up Assistance     Balance/Coordination    Static Sitting  Long Term Goal: Complete static sitting with Modified Independent      Dynamic Sitting  Long Term Goal: Complete dynamic sitting with Supervision or Set-up Assistance  with  minimal  verbal cues minimal excursions    Static Standing  Short Term Goal: Complete static standing with  Stand-by Assistance with  minimal  verbal cues and RW  Long Term Goal: Complete static standing with Supervision or Set-up Assistance  with   minimal   verbal cues and RW    Dynamic Standing  Short Term Goal: Complete dynamic standing with Stand-by Assistance  with minimal   verbal cues minimal  excursions with RW  Long Term Goal: Complete dynamic standing with Supervision or Set-up Assistance with minimal   verbal cues and minimal  excursions with RW    Endurance    Short Term Goal:   Physical Therapy: 2 times a day, 30-45 minutes  Rest periods: multiple  Sitting: 3 hours/day    Long Term Goal:  Physical Therapy: 2 times a day, 45 minutes  Rest periods: minimal  Sittin-8 hours/day    Mobility/Gait  Short Term Goal: Will ambulate with Stand-by Assistance  Using Rolling Walker with minimal   verbal cues x 100 ft  Long Term Goal:  Will ambulate with Stand-by Assistance  using Rolling Walker with minimal  verbal cues x 150 ft    Stairs Assistance  Long Term Goal: Patient will ascend/descend 4 stairs/steps using both  handrails  nonreciprocal steps with Minimal Assistance and minimal  verbal cues    Wheelchair Skills/Surface  Long Term Goal: Patient will propel Standard wheelchair x 300 feet with Level tileWITH Bilateral upper extremity with Modified Independent       Ramp/Uneven 10 feet surface  Long  Term Goal; Patient will be able to navigate a 10 inch uneven surface with proper A.D. with  contact guard assistance/stand by assistance       2-6  inch curb  Long  Term Goal; Patient will be able to navigate a  2  inch curb with RW with  minimal assistance     Picking up object   Long  Term Goal; Patient will be able to  a small object from the floor  with a RW . with  stand by assistance     Car transfers  Long  Term Goal; Patient will be able to get in and out of a vehicle  with RW  with  minimal assistance     Education  Long Term Goals:  Patient/family/caregivers trained on physical mobility and precautions with Supervision.    Patient/family/caregivers trained on safety awareness with Supervision.    Order and obtain all appropriate equipment.                          Plan:     During this hospitalization, patient to be seen 5 x/week to address the identified rehab impairments via gait training, therapeutic activities, therapeutic exercises, neuromuscular re-education, wheelchair management/training and progress toward the following goals:    Plan of Care Expires:  01/31/24  PT Next Visit Date: 01/19/24  Plan of Care reviewed with: patient    Additional Information:     Utilizing concurrent therapy to address goals related to safe bed mobility, transfers, and  gait activities.      Time Tracking:     Therapy Time  PT Received On: 01/18/24  PT Start Time: 0855  PT Stop Time: 1035  PT Total Time (min): 100 min   PT Individual: 60  PT  Concurrent: 40      Billable Minutes: Gait Training 45, Therapeutic Activity 15, Therapeutic Exercise 30, and Neuromuscular Re-education 10    01/18/2024

## 2024-01-19 PROCEDURE — 97530 THERAPEUTIC ACTIVITIES: CPT

## 2024-01-19 PROCEDURE — 25000003 PHARM REV CODE 250: Performed by: INTERNAL MEDICINE

## 2024-01-19 PROCEDURE — 97535 SELF CARE MNGMENT TRAINING: CPT

## 2024-01-19 PROCEDURE — 97110 THERAPEUTIC EXERCISES: CPT

## 2024-01-19 PROCEDURE — 25000003 PHARM REV CODE 250: Performed by: STUDENT IN AN ORGANIZED HEALTH CARE EDUCATION/TRAINING PROGRAM

## 2024-01-19 PROCEDURE — 25000003 PHARM REV CODE 250

## 2024-01-19 PROCEDURE — 11800000 HC REHAB PRIVATE ROOM

## 2024-01-19 PROCEDURE — 97116 GAIT TRAINING THERAPY: CPT

## 2024-01-19 RX ADMIN — ZIPRASIDONE HYDROCHLORIDE 40 MG: 40 CAPSULE ORAL at 09:01

## 2024-01-19 RX ADMIN — GABAPENTIN 300 MG: 300 CAPSULE ORAL at 08:01

## 2024-01-19 RX ADMIN — OXYCODONE HYDROCHLORIDE 5 MG: 5 TABLET ORAL at 11:01

## 2024-01-19 RX ADMIN — SENNOSIDES AND DOCUSATE SODIUM 1 TABLET: 8.6; 5 TABLET ORAL at 08:01

## 2024-01-19 RX ADMIN — FAMOTIDINE 20 MG: 20 TABLET, FILM COATED ORAL at 08:01

## 2024-01-19 RX ADMIN — METHOCARBAMOL TABLETS 750 MG: 750 TABLET, COATED ORAL at 08:01

## 2024-01-19 RX ADMIN — FAMOTIDINE 20 MG: 20 TABLET, FILM COATED ORAL at 09:01

## 2024-01-19 RX ADMIN — GABAPENTIN 300 MG: 300 CAPSULE ORAL at 04:01

## 2024-01-19 RX ADMIN — OXYCODONE HYDROCHLORIDE 5 MG: 5 TABLET ORAL at 08:01

## 2024-01-19 RX ADMIN — POLYETHYLENE GLYCOL (3350) 17 G: 17 POWDER, FOR SOLUTION ORAL at 08:01

## 2024-01-19 RX ADMIN — GABAPENTIN 300 MG: 300 CAPSULE ORAL at 09:01

## 2024-01-19 RX ADMIN — RIVAROXABAN 20 MG: 10 TABLET, FILM COATED ORAL at 04:01

## 2024-01-19 NOTE — PT/OT/SLP PROGRESS
"Physical Therapy Inpatient Rehab Treatment    Patient Name:  Lupe Santizo   MRN:  [id number]    Recommendations:     Discharge Recommendations:  Low Intensity Therapy   Discharge Equipment Recommendations: walker, rolling, bedside commode, bath bench   Barriers to discharge: None    Assessment:     Lupe Santizo is a 43 y.o. female admitted with a medical diagnosis of Status post total left knee replacement.  She presents with the following impairments/functional limitations:      weakness, pain, cognitive deficits, impaired coordination, impaired balance, gait deviations, difficulty with functional transfers, difficulty with bed mobility, difficulty with wheelchair propulsion, impaired sensation, impaired proprioception, decreased motor control, decreased safety, and decreased endurance.Patient is deaf, she communicates via writing. Patient presents with 0/5 strength on the right ankle dorsiflexors, decrease active ROM on both ankle (can go to neutral only), hyperextension of the right knee passively done. Absent sensation on the right leg.       Communicated with patient's mother and "aunt/caregiver" in the presence of attending nurse Evon Cummings LPN to get a prior level of function and learn a better communication strategy to assist with patient's treatment.  Mother reports that patient had a "stroke: and she can not remember when it happened, mother seems to have difficulty with details..  They reported that patient needs help and assistance with all basic ADL's and she can only ambulate about 20 feet with a RW with assistance.  Patient  can understand one word gestures such as "walk, mother and work" and can communicate via writing but only simple things. They reported that the patient has trouble with her foot and she is seeing an orthopedic doctor but they do not know which foot and what the problem is being treated.     Patient had an incontinent bladder episode  during this session, patient is " "unaware that she had a bowel movement.     Rehab Diagnosis:  Left TKA, WBAT   and CVA with residual deficits     Recent Surgery: * No surgery found *      General Precautions: Standard, deaf, fall     Orthopedic Precautions:LLE weight bearing as tolerated     Braces: N/A    Rehab Prognosis: Fair and Poor; patient would benefit from acute skilled PT services to address these deficits and reach maximum level of function.      History:     Past Medical History:   Diagnosis Date    Anticoagulant long-term use     Deaf     History of DVT (deep vein thrombosis)     Primary osteoarthritis of left knee        Past Surgical History:   Procedure Laterality Date    CHOLECYSTECTOMY      KNEE SURGERY      TOTAL KNEE ARTHROPLASTY Left 1/11/2024    Procedure: ARTHROPLASTY, KNEE, TOTAL;  Surgeon: Phoenix Godoy MD;  Location: [facility];  Service: Orthopedics;  Laterality: Left;       Subjective     Chief Complaint: "Hurt"     Respiratory Status: Room air    Patients cultural, spiritual, Bahai conflicts given the current situation: no      Objective:     Communicated with nurse, patient, mother and aunt prior to session.  Patient found up in chair with    upon PT entry to room.    Pt is Oriented x3, Not oriented to place, Not oriented to situation, and Not oriented to time and Alert and Cooperative.      Pain Left knee       Functional Mobility:   Bed Mobility:     Rolling Left:  Supervision or touching assistance   Rolling Right: Supervision or touching assistance   Scooting: Supervision or touching assistance   Bridging: Supervision or touching assistance   Supine to Sit: Supervision or touching assistance   Sit to Supine: Supervision or touching assistance   Transfers:     Sit to Stand: Supervision or touching assistance  with rolling walker  Bed to Chair: Supervision or touching assistance  with rolling walker  using  Step Transfer  Chair to mat: Supervision or touching assistance  with  rolling walker  using  Step " Transfer  Toilet Transfer: Supervision or touching assistance  with  rolling walker  using  Step Transfer  Balance: Static Sitting/Standing  Patient performed static standing on level surface using rolling walker with Supervision or touching assistance  and minimal verbal cues.    Static Sit: GOOD-: Takes MODERATE challenges from all directions but inconsistently  Static Stand: FAIR+: Takes MINIMAL challenges from all directions     Current   Status  Discharge   Goal   Functional Area: Care Score:    Roll Left and Right 4 Set-up/clean-up   Sit to Lying 4 Set-up/clean-up   Lying to Sitting on Side of Bed 4 Set-up/clean-up   Sit to Stand 4 Set-up/clean-up   Chair/Bed-to-Chair Transfer 4 Set-up/clean-up   Car Transfer 10 Supervision or touching assistance   Walk 10 Feet 3 Supervision or touching assistance   Walk 50 Feet with Two Turns 88 Supervision or touching assistance   Walk 150 Feet 88 Supervision or touching assistance   Walk 10 Feet Uneven Surface 88 Supervision or touching assistance   1 Step (Curb) 88 Supervision or touching assistance   4 Steps 88 Supervision or touching assistance   12 Steps 88 Not applicable   Picking Up Object 4 Set-up/clean-up   Wheel 50 Feet with Two Turns 6 Independent   Wheel 150 Feet 6 Independent       Therapeutic Activities and Exercises:  Sit <> stand with both UE support  Stand step transfer with a RW, wheelchair <> mat, wheelchair <> regular toilet, wheelchair to bed    Sit <> supine  Rolling side <> side   Standing balance      SUPINE Left Lower Extremity   Active assisted  ROM Sets / Reps / Resistance / Etc.   Ankle PF and DF 3 x 10   Short Arc Quads 3 x 10    Quad Sets 3 x 10       Stretching of left knee towards extension with overpressure at end range as tolerated       Activity Tolerance: Fair    Patient left HOB elevated with call button in reach, bed alarm on, and nurse  notified.    Education provided: roles and goals of PT/PTA, transfer training, bed mob, balance  training, safety awareness, strengthening exercises, and stretching     Expected compliance: Low compliance    GOALS:   Multidisciplinary Problems       Physical Therapy Goals          Problem: Physical Therapy    Goal Priority Disciplines Outcome Goal Variances Interventions   Physical Therapy Goal     PT, PT/OT Ongoing, Progressing     Description:   Short Term Goals: 1/24/2024   Long Term Goals: 1/31/2024     Transfers Status    Sit to Stand  Short Term Goal: Complete sit to stand with Stand-by Assistance using Rolling Walker with minimal  verbal cues  Long Term Goal:  Complete sit to stand with Supervision or Set-up Assistance using Rolling Walker     Stand Step  Short Term Goal: Complete stand step with  Stand-by Assistance  using Rolling Walker with minimal   verbal cues  Long Term Goal:  Complete stand step with  Supervision or Set-up Assistance  using Rolling Walker     Supine <> Sit    Long Term Goal: Complete supine <> sit with Supervision or Set-up Assistance      Rolling Right/Left  Long Term Goal: Complete rolling  right/left with Supervision or Set-up Assistance     Balance/Coordination    Static Sitting  Long Term Goal: Complete static sitting with Modified Independent      Dynamic Sitting  Long Term Goal: Complete dynamic sitting with Supervision or Set-up Assistance  with  minimal  verbal cues minimal excursions    Static Standing  Short Term Goal: Complete static standing with  Stand-by Assistance with  minimal  verbal cues and RW  Long Term Goal: Complete static standing with Supervision or Set-up Assistance  with   minimal   verbal cues and RW    Dynamic Standing  Short Term Goal: Complete dynamic standing with Stand-by Assistance  with minimal   verbal cues minimal  excursions with RW  Long Term Goal: Complete dynamic standing with Supervision or Set-up Assistance with minimal   verbal cues and minimal  excursions with RW    Endurance    Short Term Goal:   Physical Therapy: 2 times a day, 30-45  minutes  Rest periods: multiple  Sitting: 3 hours/day    Long Term Goal:  Physical Therapy: 2 times a day, 45 minutes  Rest periods: minimal  Sittin-8 hours/day    Mobility/Gait  Short Term Goal: Will ambulate with Stand-by Assistance  Using Rolling Walker with minimal   verbal cues x 100 ft  Long Term Goal: Will ambulate with Stand-by Assistance  using Rolling Walker with minimal  verbal cues x 150 ft    Stairs Assistance  Long Term Goal: Patient will ascend/descend 4 stairs/steps using both  handrails  nonreciprocal steps with Minimal Assistance and minimal  verbal cues    Wheelchair Skills/Surface  Long Term Goal: Patient will propel Standard wheelchair x 300 feet with Level tileWITH Bilateral upper extremity with Modified Independent       Ramp/Uneven 10 feet surface  Long  Term Goal; Patient will be able to navigate a 10 inch uneven surface with proper A.D. with  contact guard assistance/stand by assistance       2-6  inch curb  Long  Term Goal; Patient will be able to navigate a  2  inch curb with RW with  minimal assistance     Picking up object   Long  Term Goal; Patient will be able to  a small object from the floor  with a RW . with  stand by assistance     Car transfers  Long  Term Goal; Patient will be able to get in and out of a vehicle  with RW  with  minimal assistance     Education  Long Term Goals:  Patient/family/caregivers trained on physical mobility and precautions with Supervision.    Patient/family/caregivers trained on safety awareness with Supervision.    Order and obtain all appropriate equipment.                          Plan:     During this hospitalization, patient to be seen 5 x/week to address the identified rehab impairments via gait training, therapeutic activities, therapeutic exercises, neuromuscular re-education, wheelchair management/training and progress toward the following goals:    Plan of Care Expires:  24  PT Next Visit Date: 24  Plan of Care reviewed  with: patient    Additional Information:         Time Tracking:     Therapy Time  PT Received On: 01/19/24  PT Start Time: 1300  PT Stop Time: 1400  PT Total Time (min): 60 min   PT Individual: 60      Billable Minutes: Therapeutic Activity 45 and Therapeutic Exercise 15    01/19/2024

## 2024-01-19 NOTE — SUBJECTIVE & OBJECTIVE
Past Medical History:   Diagnosis Date    Anticoagulant long-term use     Deaf     History of DVT (deep vein thrombosis)     Primary osteoarthritis of left knee        Past Surgical History:   Procedure Laterality Date    CHOLECYSTECTOMY      KNEE SURGERY      TOTAL KNEE ARTHROPLASTY Left 1/11/2024    Procedure: ARTHROPLASTY, KNEE, TOTAL;  Surgeon: Phoenix Godoy MD;  Location: [facility];  Service: Orthopedics;  Laterality: Left;       Review of patient's allergies indicates:  No Known Allergies    Current Facility-Administered Medications on File Prior to Encounter   Medication    methylPREDNISolone acetate injection 40 mg     Current Outpatient Medications on File Prior to Encounter   Medication Sig    acetaminophen (TYLENOL) 500 MG tablet Take 2 tablets (1,000 mg total) by mouth every 8 (eight) hours as needed for Pain.    diclofenac (VOLTAREN) 75 MG EC tablet Take 1 tablet (75 mg total) by mouth 2 (two) times daily.    gabapentin (NEURONTIN) 300 MG capsule Take 1 capsule (300 mg total) by mouth 3 (three) times daily.    methocarbamoL (ROBAXIN) 750 MG Tab Take 1 tablet (750 mg total) by mouth 3 (three) times daily as needed (Pain not relieved with Tylenol, diclofenac, or gabapentin).    oxyCODONE (ROXICODONE) 5 MG immediate release tablet Take 1 tablet (5 mg total) by mouth every 4 (four) hours as needed for Pain (pain not relieved by all other medications).    rivaroxaban (XARELTO) 20 mg Tab Take 20 mg by mouth.    ziprasidone (GEODON) 40 MG Cap TAKE 1 CAPSULE BY MOUTH DAILY AT BEDTIME TAKE WITH FOOD     Family History    None       Tobacco Use    Smoking status: Never    Smokeless tobacco: Current   Substance and Sexual Activity    Alcohol use: Never    Drug use: Never    Sexual activity: Not Currently     Review of Systems   Constitutional:  Positive for activity change and fatigue. Negative for appetite change, chills, diaphoresis, fever and unexpected weight change.   HENT:  Positive for hearing loss.  Negative for congestion, dental problem, drooling, facial swelling, mouth sores, nosebleeds, sore throat and trouble swallowing.    Eyes:  Negative for visual disturbance.   Respiratory:  Negative for cough, shortness of breath and wheezing.    Cardiovascular:  Negative for chest pain, palpitations and leg swelling.   Gastrointestinal:  Positive for diarrhea. Negative for abdominal distention, abdominal pain, blood in stool, constipation, nausea and vomiting.   Endocrine: Negative for polyphagia.   Genitourinary:  Negative for difficulty urinating, dysuria, flank pain and frequency.   Musculoskeletal:  Positive for arthralgias, gait problem, joint swelling and myalgias. Negative for back pain.   Skin:  Negative for rash.        Surgical incision site L knee   Allergic/Immunologic: Negative.    Neurological:  Positive for speech difficulty and weakness. Negative for dizziness, tremors, seizures, syncope, facial asymmetry, light-headedness, numbness and headaches.   Hematological:  Negative for adenopathy. Bruises/bleeds easily.   Psychiatric/Behavioral:  Negative for agitation, confusion and hallucinations. The patient is not nervous/anxious.      Objective:     Vital Signs (Most Recent):  Temp: 98.4 °F (36.9 °C) (01/19/24 0515)  Pulse: 101 (01/19/24 0515)  Resp: 20 (01/19/24 0515)  BP: 117/62 (01/19/24 0515)  SpO2: 95 % (01/19/24 0515) Vital Signs (24h Range):  Temp:  [98.1 °F (36.7 °C)-98.4 °F (36.9 °C)] 98.4 °F (36.9 °C)  Pulse:  [] 101  Resp:  [18-20] 20  SpO2:  [95 %-98 %] 95 %  BP: (117-124)/(60-62) 117/62     Weight: 94.4 kg (208 lb 1.8 oz)  Body mass index is 35.72 kg/m².     Physical Exam  Vitals and nursing note reviewed.   Constitutional:       General: She is awake. She is not in acute distress.     Appearance: Normal appearance. She is obese. She is not ill-appearing, toxic-appearing or diaphoretic.   HENT:      Head: Normocephalic and atraumatic.      Right Ear: External ear normal. Decreased  hearing noted.      Left Ear: External ear normal. Decreased hearing noted.      Nose: Nose normal. No congestion or rhinorrhea.      Mouth/Throat:      Mouth: Mucous membranes are moist.      Pharynx: Oropharynx is clear. No oropharyngeal exudate or posterior oropharyngeal erythema.   Eyes:      General: No scleral icterus.        Right eye: No discharge.         Left eye: No discharge.      Extraocular Movements: Extraocular movements intact.      Conjunctiva/sclera: Conjunctivae normal.      Pupils: Pupils are equal, round, and reactive to light.   Neck:      Thyroid: No thyroid mass or thyromegaly.      Vascular: No carotid bruit.      Meningeal: Brudzinski's sign and Kernig's sign absent.   Cardiovascular:      Rate and Rhythm: Normal rate and regular rhythm.      Chest Wall: PMI is not displaced. No thrill.      Pulses: Normal pulses.      Heart sounds: Normal heart sounds. No murmur heard.     No friction rub. No gallop.   Pulmonary:      Effort: Pulmonary effort is normal. No tachypnea, accessory muscle usage, prolonged expiration or respiratory distress.      Breath sounds: Normal breath sounds. No stridor or decreased air movement. No wheezing, rhonchi or rales.   Chest:      Chest wall: No tenderness.   Abdominal:      General: Bowel sounds are normal. There is no distension.      Palpations: Abdomen is soft. There is no hepatomegaly, splenomegaly or mass.      Tenderness: There is no abdominal tenderness. There is no right CVA tenderness, left CVA tenderness, guarding or rebound.      Hernia: No hernia is present.   Musculoskeletal:         General: Tenderness present. No swelling or deformity.      Cervical back: Neck supple. No rigidity. No muscular tenderness.      Right lower leg: No edema.      Left lower leg: Edema present.      Comments: Surgical site clear   Lymphadenopathy:      Cervical: No cervical adenopathy.   Skin:     General: Skin is warm.      Capillary Refill: Capillary refill takes  less than 2 seconds.      Coloration: Skin is not cyanotic, jaundiced or pale.      Findings: No erythema, petechiae or rash.   Neurological:      Mental Status: She is alert and oriented to person, place, and time. Mental status is at baseline.      Cranial Nerves: Cranial nerve deficit present. No dysarthria or facial asymmetry.      Motor: Weakness present. No tremor.      Gait: Gait abnormal.   Psychiatric:         Mood and Affect: Mood normal. Mood is not anxious or depressed. Affect is not flat.         Speech: Speech is not rapid and pressured or slurred.         Behavior: Behavior normal. Behavior is not agitated, aggressive or combative.         Thought Content: Thought content normal. Thought content is not paranoid or delusional.         Cognition and Memory: Cognition is not impaired. Memory is not impaired.      Comments: + learning impairment, + deaf              Significant Imaging: I have reviewed all pertinent imaging results/findings within the past 24 hours.

## 2024-01-19 NOTE — PROGRESS NOTES
[facility] Medicine  Progress Note    Patient Name: Lupe Santizo  MRN: [id number]  Patient Class: IP- Rehab   Admission Date: 1/16/2024  Length of Stay: 3 days  Attending Physician: Ricardo Mckeon III, MD  Primary Care Provider: Alicia, Primary Doctor        Subjective:     Principal Problem:Status post total left knee replacement        HPI:  History of present illness:      Lupe Santizo is a 43 y.o. female who presents for evaluation of their left knee pain. Mother reports daughter's pain has been present for years. Patient is both unable to hear and unable to speak, therefore history is from mother who is also MPOA. Patient also has a learning disability. Mother reports that the knee is the primary source of pain for the patient. Pain is mostly located medially. She had an injection at her last visit which gave her temporarily relief. She is on Xarelto for a DVT following gallbladder surgery, which she will be on for life. Patient returns today for f/u of L knee OA. She has since obtained clearance form her PCP and cardiologist which shows that she is low to intermediate risk and is optimized for surgery and okay to hold Xarelto 3 days preop that she is a high risk for recurrent DVT. She would like to proceed with TKA.      Patient underwent a successful left total knee arthroplasty and postoperatively her recovery has been uneventful.  Her care team reached out to us because of her learning impairment and communication challenges and felt that it was safest for her to recover and an inpatient rehab setting to avoid further complications and morbidity.  I have seen and evaluated the patient this morning we are communicating through a tablet patient is smiling her mood seems to be good she is complaining of pain at the knee her surgical site seems clear her dressing does have some serosanguineous discharge.  Patient's chart has been reviewed and reconciled all medications updated.      Pt. Requires acute inpatient rehab admission with 24-hour nursing and active physician oversight to monitor and manage acute medical comorbid conditions, labs, pain, and functional deficits. Patient/family will also require teaching and integration of improving functional skills into daily living. She will also require an individualized, interdisciplinary approach to her care, receiving PT, OT services 3 hours per day, 5 days per week. Required care cannot be provided at a lower level of care. Patient is anticipated to require approximately 10-14 days LOS with expected discharge home with mother and with      Impairment group (IGC):   Unilateral Kneee Replacements 08.61 Etiologic diagnosis/description:   M17.12: Osteoarthritis of left knee   Date of onset:  1/11/2024 Date of surgery:  1/11/2024   Allergies: Patient has no known allergies.   Comorbid condition: Deaf, hx of CVA, unable to speak, hx of DVT, OA   Medical/functional conditions requiring inpatient rehabilitation:      This patient requires medical management/24-hour nursing of complex co morbidities Deaf, hx of CVA, unable to speak, hx of DVT, OA, labs, medications (see medications list), pain, sleep hygiene, anticoagulation, nutrition, hydration, neurological,  and preventive healthcare.     This patient requires intense therapy and an integrated, interdisciplinary approach to address safety, impaired mobility, impaired ADLs (dressing, toileting, grooming, showering), judgment, and memory, communication, bowel/bladder problems,preventive healthcare, medication management, integration of functional skills into daily living, and home caregiver support and training.      Risk for medical/clinical complications:      This patient is at risk for the following complications: DVT/PE, pneumonia, malnutrition, worsening activity intolerance, complications from anticoagulation,  skin breakdown, inadequate sleep, recurring stroke, and constipation.         Overview/Hospital Course:  01/18 CP: Pt c/o pain/soreness after working with therapy. Also reports loose stools. Med orders placed. Pt is in good spirits and working hard with PT this am. Encouraged good therapy efforts. Unable to visualize surgical site today.  1/19 DL:  Patient doing well.  No acute events reported.  Labs and vital signs stable.  Patient doing well with therapy.  Encouraged to continue great therapy efforts.    Past Medical History:   Diagnosis Date    Anticoagulant long-term use     Deaf     History of DVT (deep vein thrombosis)     Primary osteoarthritis of left knee        Past Surgical History:   Procedure Laterality Date    CHOLECYSTECTOMY      KNEE SURGERY      TOTAL KNEE ARTHROPLASTY Left 1/11/2024    Procedure: ARTHROPLASTY, KNEE, TOTAL;  Surgeon: Phoenix Godoy MD;  [facility]  Service: Orthopedics;  Laterality: Left;       Review of patient's allergies indicates:  No Known Allergies    Current Facility-Administered Medications on File Prior to Encounter   Medication    methylPREDNISolone acetate injection 40 mg     Current Outpatient Medications on File Prior to Encounter   Medication Sig    acetaminophen (TYLENOL) 500 MG tablet Take 2 tablets (1,000 mg total) by mouth every 8 (eight) hours as needed for Pain.    diclofenac (VOLTAREN) 75 MG EC tablet Take 1 tablet (75 mg total) by mouth 2 (two) times daily.    gabapentin (NEURONTIN) 300 MG capsule Take 1 capsule (300 mg total) by mouth 3 (three) times daily.    methocarbamoL (ROBAXIN) 750 MG Tab Take 1 tablet (750 mg total) by mouth 3 (three) times daily as needed (Pain not relieved with Tylenol, diclofenac, or gabapentin).    oxyCODONE (ROXICODONE) 5 MG immediate release tablet Take 1 tablet (5 mg total) by mouth every 4 (four) hours as needed for Pain (pain not relieved by all other medications).    rivaroxaban (XARELTO) 20 mg Tab Take 20 mg by mouth.    ziprasidone (GEODON) 40 MG Cap TAKE 1 CAPSULE BY MOUTH DAILY AT  BEDTIME TAKE WITH FOOD     Family History    None       Tobacco Use    Smoking status: Never    Smokeless tobacco: Current   Substance and Sexual Activity    Alcohol use: Never    Drug use: Never    Sexual activity: Not Currently     Review of Systems   Constitutional:  Positive for activity change and fatigue. Negative for appetite change, chills, diaphoresis, fever and unexpected weight change.   HENT:  Positive for hearing loss. Negative for congestion, dental problem, drooling, facial swelling, mouth sores, nosebleeds, sore throat and trouble swallowing.    Eyes:  Negative for visual disturbance.   Respiratory:  Negative for cough, shortness of breath and wheezing.    Cardiovascular:  Negative for chest pain, palpitations and leg swelling.   Gastrointestinal:  Positive for diarrhea. Negative for abdominal distention, abdominal pain, blood in stool, constipation, nausea and vomiting.   Endocrine: Negative for polyphagia.   Genitourinary:  Negative for difficulty urinating, dysuria, flank pain and frequency.   Musculoskeletal:  Positive for arthralgias, gait problem, joint swelling and myalgias. Negative for back pain.   Skin:  Negative for rash.        Surgical incision site L knee   Allergic/Immunologic: Negative.    Neurological:  Positive for speech difficulty and weakness. Negative for dizziness, tremors, seizures, syncope, facial asymmetry, light-headedness, numbness and headaches.   Hematological:  Negative for adenopathy. Bruises/bleeds easily.   Psychiatric/Behavioral:  Negative for agitation, confusion and hallucinations. The patient is not nervous/anxious.      Objective:     Vital Signs (Most Recent):  Temp: 98.4 °F (36.9 °C) (01/19/24 0515)  Pulse: 101 (01/19/24 0515)  Resp: 20 (01/19/24 0515)  BP: 117/62 (01/19/24 0515)  SpO2: 95 % (01/19/24 0515) Vital Signs (24h Range):  Temp:  [98.1 °F (36.7 °C)-98.4 °F (36.9 °C)] 98.4 °F (36.9 °C)  Pulse:  [] 101  Resp:  [18-20] 20  SpO2:  [95 %-98 %] 95  %  BP: (117-124)/(60-62) 117/62     Weight: 94.4 kg (208 lb 1.8 oz)  Body mass index is 35.72 kg/m².     Physical Exam  Vitals and nursing note reviewed.   Constitutional:       General: She is awake. She is not in acute distress.     Appearance: Normal appearance. She is obese. She is not ill-appearing, toxic-appearing or diaphoretic.   HENT:      Head: Normocephalic and atraumatic.      Right Ear: External ear normal. Decreased hearing noted.      Left Ear: External ear normal. Decreased hearing noted.      Nose: Nose normal. No congestion or rhinorrhea.      Mouth/Throat:      Mouth: Mucous membranes are moist.      Pharynx: Oropharynx is clear. No oropharyngeal exudate or posterior oropharyngeal erythema.   Eyes:      General: No scleral icterus.        Right eye: No discharge.         Left eye: No discharge.      Extraocular Movements: Extraocular movements intact.      Conjunctiva/sclera: Conjunctivae normal.      Pupils: Pupils are equal, round, and reactive to light.   Neck:      Thyroid: No thyroid mass or thyromegaly.      Vascular: No carotid bruit.      Meningeal: Brudzinski's sign and Kernig's sign absent.   Cardiovascular:      Rate and Rhythm: Normal rate and regular rhythm.      Chest Wall: PMI is not displaced. No thrill.      Pulses: Normal pulses.      Heart sounds: Normal heart sounds. No murmur heard.     No friction rub. No gallop.   Pulmonary:      Effort: Pulmonary effort is normal. No tachypnea, accessory muscle usage, prolonged expiration or respiratory distress.      Breath sounds: Normal breath sounds. No stridor or decreased air movement. No wheezing, rhonchi or rales.   Chest:      Chest wall: No tenderness.   Abdominal:      General: Bowel sounds are normal. There is no distension.      Palpations: Abdomen is soft. There is no hepatomegaly, splenomegaly or mass.      Tenderness: There is no abdominal tenderness. There is no right CVA tenderness, left CVA tenderness, guarding or  rebound.      Hernia: No hernia is present.   Musculoskeletal:         General: Tenderness present. No swelling or deformity.      Cervical back: Neck supple. No rigidity. No muscular tenderness.      Right lower leg: No edema.      Left lower leg: Edema present.      Comments: Surgical site clear   Lymphadenopathy:      Cervical: No cervical adenopathy.   Skin:     General: Skin is warm.      Capillary Refill: Capillary refill takes less than 2 seconds.      Coloration: Skin is not cyanotic, jaundiced or pale.      Findings: No erythema, petechiae or rash.   Neurological:      Mental Status: She is alert and oriented to person, place, and time. Mental status is at baseline.      Cranial Nerves: Cranial nerve deficit present. No dysarthria or facial asymmetry.      Motor: Weakness present. No tremor.      Gait: Gait abnormal.   Psychiatric:         Mood and Affect: Mood normal. Mood is not anxious or depressed. Affect is not flat.         Speech: Speech is not rapid and pressured or slurred.         Behavior: Behavior normal. Behavior is not agitated, aggressive or combative.         Thought Content: Thought content normal. Thought content is not paranoid or delusional.         Cognition and Memory: Cognition is not impaired. Memory is not impaired.      Comments: + learning impairment, + deaf              Significant Imaging: I have reviewed all pertinent imaging results/findings within the past 24 hours.    Assessment/Plan:      * Status post total left knee replacement  Patient's pain seems to be fairly well controlled postoperatively agree with current regimen and adjust as needed.  PT/OT evaluations reviewed.      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      Essential hypertension  Chronic,  controlled. Latest blood pressure and vitals reviewed-     Temp:  [98.1 °F (36.7 °C)-98.4 °F (36.9 °C)]   Pulse:  []   Resp:  [18-20]   BP: (117-124)/(60-62)   SpO2:  [95 %-98 %] .   Home meds for hypertension were reviewed and noted below.       While in the hospital, will manage blood pressure as follows; Continue home antihypertensive regimen    Will utilize p.r.n. blood pressure medication only if patient's blood pressure greater than 140/90 and she develops symptoms such as worsening chest pain or shortness of breath.    Bilateral deafness  Patient able to communicate with tablet and has some basic reading and writing skills.      Obesity (BMI 30-39.9)  Body mass index is 35.72 kg/m². Morbid obesity complicates all aspects of disease management from diagnostic modalities to treatment. Weight loss encouraged and health benefits explained to patient.         Primary osteoarthritis of left knee  Patient's pain seems to be fairly well controlled postoperatively agree with current regimen and adjust as needed.        VTE Risk Mitigation (From admission, onward)           Ordered     rivaroxaban tablet 20 mg  with dinner         01/16/24 1435     IP VTE LOW RISK PATIENT  Once         01/16/24 1435     Place sequential compression device  Until discontinued         01/16/24 1435                    Discharge Planning   FRED:      Code Status: Full Code   Is the patient medically ready for discharge?:     Reason for patient still in hospital (select all that apply): Patient trending condition and PT / OT recommendations  Discharge Plan A: Home with family                  Leslie Guidry NP  Department of Hospital Medicine   [facility] ([facility])

## 2024-01-19 NOTE — PLAN OF CARE
Problem: Occupational Therapy  Goal: Occupational Therapy Goal  Description:  Long Term Goals to be met by: 01/30/24     Patient will increase functional independence with ADLs by performing:    Feeding with [place].  UE Dressing with Modified [place].  LE Dressing with Supervision.  Grooming while seated at sink with Modified [place].  Toileting from toilet with Set-up Assistance for hygiene and clothing management.   Bathing from  shower chair/bench with Set-up Assistance.  Toilet transfer to toilet with Set-up Assistance.  Increased functional strength to 4+/5 to 5/5 for bilateral UE's.    Outcome: Ongoing, Progressing

## 2024-01-19 NOTE — PT/OT/SLP PROGRESS
Occupational Therapy Inpatient Rehab Treatment    Name: Lupe Santizo  MRN: [id number]    Assessment:  Lupe Santizo is a 43 y.o. female admitted with a medical diagnosis of Status post total left knee replacement.  She presents with the following impairments/functional limitations:  weakness, impaired endurance, impaired self care skills, impaired functional mobility, gait instability, impaired balance, visual deficits, impaired cognition, decreased coordination, decreased upper extremity function, decreased lower extremity function, decreased safety awareness, pain, decreased ROM, impaired fine motor, impaired skin, edema, impaired cardiopulmonary response to activity, impaired joint extensibility, impaired muscle length.    Pt demonstrated improved functional performance with UB dressing as noted by supervision in which patient able to thread bilateral Ue's and head, and pull / adjust pullover shirt to waist on back with cueing.    General Precautions: Standard, deaf, fall     Orthopedic Precautions:LLE weight bearing as tolerated     Braces: N/A    Rehab Prognosis: Good; patient would benefit from acute skilled OT services to address these deficits and reach maximum level of function.      History:     Past Medical History:   Diagnosis Date    Anticoagulant long-term use     Deaf     History of DVT (deep vein thrombosis)     Primary osteoarthritis of left knee        Past Surgical History:   Procedure Laterality Date    CHOLECYSTECTOMY      KNEE SURGERY      TOTAL KNEE ARTHROPLASTY Left 1/11/2024    Procedure: ARTHROPLASTY, KNEE, TOTAL;  Surgeon: Phoenix Godoy MD;  Location: [facility];  Service: Orthopedics;  Laterality: Left;       Subjective     Orientation: Oriented x4    Chief Complaint: weakness and pain     Patient/Family Comments/goals: Pt would like to improve her overall independence with ADL's including functional mobility.      Respiratory Status: Room air    Patients cultural, spiritual,  Mormonism conflicts given the current situation: no       Objective:     Patient found up in chair with    upon OT entry to room.    Mobility   Patient completed:  Sit to Stand Transfer with minimum assistance with rolling walker  Bed to Chair Transfer using Step Transfer technique with minimum assistance with rolling walker  Toilet Transfer Step Transfer technique with minimum assistance with  grab bars    ADLs   Current Status   Eating     Oral Hygiene     Shower, Bathe Self     Upper Body Dressing 4   Lower Body Dressing 2   Toileting Hygiene     Toilet Transfer     Putting On, Taking Off Footwear 2     Limiting Factors for ADLs: endurance, limited ROM, balance, weakness, body habitus, coordination, cognition, safety awareness, and pain     Therapeutic Activities  Pt participated in therapeutic activity addressing trunk mobility, trunk control, dynamic sitting balance, and trunk strength utilizing large stability ball challenging her to perform trunk flexion, extension, and lateral flexion requiring verbal and tactile cueing to facilitate optimal movement patterns and increased range per trial in order to improve active ROM impacting performance with functional tasks below waist.     Therapeutic Exercise  Pt participated in therapeutic exercise performing 5x12 seated pushups requiring verbal and tactile cueing for technique challenging him to lift buttocks off seated surface to end range elbow extension in order to strengthen triceps brachii to improve performance with sit <> stand transitions particularly from lower surfaces. Pt then participated in therapeutic exercise performing 3x12-15 bilateral UE proximal strengthening exercises utilizing heavy resistance theraband with scapular retraction, shoulder extension, shoulder adduction, horizontal abduction, shoulder flexion in plane of scaption, internal rotation and external rotation requiring verbal and tactile cueing for technique while emphasizing quality of  movement.      Additional Treatments: Pt participated in ADL retraining as further noted above emphasizing use of adaptive equipment, assistive devices, and compensatory strategies providing much extra time with demonstration requiring verbal and tactile cueing for safety awareness and technique.      LifeStyle Change and Education:             Patient left up in chair with  nurse notified.     Education provided: Roles and goals of OT, ADLs, transfer training, bed mobility, body mechanics, assistive device, wheelchair precautions, safety precautions, fall prevention, post-op precautions, equipment recommendations, and home safety    Multidisciplinary Problems       Occupational Therapy Goals          Problem: Occupational Therapy    Goal Priority Disciplines Outcome Interventions   Occupational Therapy Goal     OT, PT/OT Ongoing, Progressing    Description:  Long Term Goals to be met by: 01/30/24     Patient will increase functional independence with ADLs by performing:    Feeding with [place].  UE Dressing with Modified [place].  LE Dressing with Supervision.  Grooming while seated at sink with Modified [place].  Toileting from toilet with Set-up Assistance for hygiene and clothing management.   Bathing from  shower chair/bench with Set-up Assistance.  Toilet transfer to toilet with Set-up Assistance.  Increased functional strength to 4+/5 to 5/5 for bilateral UE's.                         Time Tracking     OT Received On: 01/19/24  Time In 0830     Time Out 1000  Total Time 90 min  Therapy Time: OT Individual: 90  Missed Time:    Missed Time Reason:      Billable Minutes: Self Care/Home Management 30, Therapeutic Activity 15, and Therapeutic Exercise 45    01/19/2024

## 2024-01-19 NOTE — PT/OT/SLP PROGRESS
Physical Therapy Inpatient Rehab Treatment    Patient Name:  Lupe Santizo   MRN:  [id number]    Recommendations:     Discharge Recommendations:  Low Intensity Therapy   Discharge Equipment Recommendations: walker, rolling, bedside commode, bath bench   Barriers to discharge: None    Assessment:     Lupe Santizo is a 43 y.o. female admitted with a medical diagnosis of Status post total left knee replacement.  She presents with the following impairments/functional limitations:    weakness, pain, cognitive deficits, impaired coordination, impaired balance, gait deviations, difficulty with functional transfers, difficulty with bed mobility, difficulty with wheelchair propulsion, impaired sensation, impaired proprioception, decreased motor control, decreased safety, and decreased endurance.Patient is deaf, she communicates via writing. Patient presents with 0/5 strength on the right ankle dorsiflexors, decrease active ROM on both ankle (can go to neutral only), hyperextension of the right knee passively done. Absent sensation on the right leg.      Noted inconsistent and inaccurate responses to yes and no questions.  Unsafe technique during gait training, patient sits down without warning.  Huge communication barrier with respect to being deaf and cognitive deficits.  Dry erase board and gestures use for communication.  Progress as tolerated. Reach to to speech therapist for better communication strategies.     Rehab Diagnosis:  Left TKA, WBAT      Recent Surgery: * No surgery found *      General Precautions: Standard, deaf, fall     Orthopedic Precautions:LLE weight bearing as tolerated     Braces: N/A    Rehab Prognosis: Fair; patient would benefit from acute skilled PT services to address these deficits and reach maximum level of function.      History:     Past Medical History:   Diagnosis Date    Anticoagulant long-term use     Deaf     History of DVT (deep vein thrombosis)     Primary osteoarthritis of left  knee        Past Surgical History:   Procedure Laterality Date    CHOLECYSTECTOMY      KNEE SURGERY      TOTAL KNEE ARTHROPLASTY Left 1/11/2024    Procedure: ARTHROPLASTY, KNEE, TOTAL;  Surgeon: Phoenix Godoy MD;  Location: [facility];  Service: Orthopedics;  Laterality: Left;       Subjective     Chief Complaint: Pain on the left knee    Respiratory Status: Room air    Patients cultural, spiritual, Episcopalian conflicts given the current situation: no      Objective:     Communicated with nurse and patient  prior to session.  Patient found up in chair with    upon PT entry to room.    Pt is Oriented x3, Not oriented to place, and Not oriented to situation and Alert and Cooperative.    Vitals   Vitals at Rest  /67 mmHg    HR 81 bpm    O2 Sat 100%    Pain Left knee         Functional Mobility:   Bed Mobility:     Rolling Left:  Supervision or touching assistance   Rolling Right: Supervision or touching assistance   Scooting: Supervision or touching assistance   Bridging: Supervision or touching assistance   Supine to Sit: Supervision or touching assistance   Sit to Supine: Supervision or touching assistance   Transfers:     Sit to Stand: Supervision or touching assistance  with rolling walker  Chair to mat: Supervision or touching assistance  with  rolling walker  using  Step Transfer  Gait: Pt ambulates 10 feet, 23 feet, 20 feet, 32 feet and 27 feet  with RW with Partial/moderate assistance .   Impairments contributing to gait deviations include impaired balance, impaired coordination, decreased flexibility, impaired motor control, pain, impaired postural control, decreased ROM, decreased sensation, impaired sensory feedback, and decreased strength.  Balance: Static Sitting/Standing  Patient performed static standing on level surface using rolling walker with Supervision or touching assistance  and minimal verbal cues.    Static Sit: GOOD-: Takes MODERATE challenges from all directions but inconsistently  Static  Stand: FAIR: Maintains without assist but unable to take challenges     object from ground in standing position with reacher with rolling walker with Supervision or touching assistance   Wheelchair Propulsion:  Pt propelled Standard wheelchair x 300 feet on Level tile with  Bilateral upper extremity with Independent.      Current   Status  Discharge   Goal   Functional Area: Care Score:    Roll Left and Right 4 Set-up/clean-up   Sit to Lying 4 Set-up/clean-up   Lying to Sitting on Side of Bed 4 Set-up/clean-up   Sit to Stand 4 Set-up/clean-up   Chair/Bed-to-Chair Transfer 4 Set-up/clean-up   Car Transfer 10 Supervision or touching assistance   Walk 10 Feet 3 Supervision or touching assistance   Walk 50 Feet with Two Turns 88 Supervision or touching assistance   Walk 150 Feet 88 Supervision or touching assistance   Walk 10 Feet Uneven Surface 88 Supervision or touching assistance   1 Step (Curb) 88 Supervision or touching assistance   4 Steps 88 Supervision or touching assistance   12 Steps 88 Not applicable   Picking Up Object 4 Set-up/clean-up   Wheel 50 Feet with Two Turns 6 Independent   Wheel 150 Feet 6 Independent       Therapeutic Activities and Exercises:  Sit <> stand with RW  Gait with RW  Wheelchair mobility using both UE  Standing balance/tolerance  Picking up a small object from the floor     Activity Tolerance: Fair    Patient left up in chair with call button in reach and nurse notified.    Education provided: roles and goals of PT/PTA, transfer training, gait training, balance training, safety awareness, wheelchair management, and strengthening exercises    Expected compliance: Low compliance    GOALS:   Multidisciplinary Problems       Physical Therapy Goals          Problem: Physical Therapy    Goal Priority Disciplines Outcome Goal Variances Interventions   Physical Therapy Goal     PT, PT/OT Ongoing, Progressing     Description:   Short Term Goals: 1/24/2024   Long Term Goals: 1/31/2024      Transfers Status    Sit to Stand  Short Term Goal: Complete sit to stand with Stand-by Assistance using Rolling Walker with minimal  verbal cues  Long Term Goal:  Complete sit to stand with Supervision or Set-up Assistance using Rolling Walker     Stand Step  Short Term Goal: Complete stand step with  Stand-by Assistance  using Rolling Walker with minimal   verbal cues  Long Term Goal:  Complete stand step with  Supervision or Set-up Assistance  using Rolling Walker     Supine <> Sit    Long Term Goal: Complete supine <> sit with Supervision or Set-up Assistance      Rolling Right/Left  Long Term Goal: Complete rolling  right/left with Supervision or Set-up Assistance     Balance/Coordination    Static Sitting  Long Term Goal: Complete static sitting with Modified Independent      Dynamic Sitting  Long Term Goal: Complete dynamic sitting with Supervision or Set-up Assistance  with  minimal  verbal cues minimal excursions    Static Standing  Short Term Goal: Complete static standing with  Stand-by Assistance with  minimal  verbal cues and RW  Long Term Goal: Complete static standing with Supervision or Set-up Assistance  with   minimal   verbal cues and RW    Dynamic Standing  Short Term Goal: Complete dynamic standing with Stand-by Assistance  with minimal   verbal cues minimal  excursions with RW  Long Term Goal: Complete dynamic standing with Supervision or Set-up Assistance with minimal   verbal cues and minimal  excursions with RW    Endurance    Short Term Goal:   Physical Therapy: 2 times a day, 30-45 minutes  Rest periods: multiple  Sitting: 3 hours/day    Long Term Goal:  Physical Therapy: 2 times a day, 45 minutes  Rest periods: minimal  Sittin-8 hours/day    Mobility/Gait  Short Term Goal: Will ambulate with Stand-by Assistance  Using Rolling Walker with minimal   verbal cues x 100 ft  Long Term Goal: Will ambulate with Stand-by Assistance  using Rolling Walker with minimal  verbal cues x 150  ft    Stairs Assistance  Long Term Goal: Patient will ascend/descend 4 stairs/steps using both  handrails  nonreciprocal steps with Minimal Assistance and minimal  verbal cues    Wheelchair Skills/Surface  Long Term Goal: Patient will propel Standard wheelchair x 300 feet with Level tileWITH Bilateral upper extremity with Modified Independent       Ramp/Uneven 10 feet surface  Long  Term Goal; Patient will be able to navigate a 10 inch uneven surface with proper A.D. with  contact guard assistance/stand by assistance       2-6  inch curb  Long  Term Goal; Patient will be able to navigate a  2  inch curb with RW with  minimal assistance     Picking up object   Long  Term Goal; Patient will be able to  a small object from the floor  with a RW . with  stand by assistance     Car transfers  Long  Term Goal; Patient will be able to get in and out of a vehicle  with RW  with  minimal assistance     Education  Long Term Goals:  Patient/family/caregivers trained on physical mobility and precautions with Supervision.    Patient/family/caregivers trained on safety awareness with Supervision.    Order and obtain all appropriate equipment.                          Plan:     During this hospitalization, patient to be seen 5 x/week to address the identified rehab impairments via gait training, therapeutic activities, therapeutic exercises, neuromuscular re-education, wheelchair management/training and progress toward the following goals:    Plan of Care Expires:  01/31/24  PT Next Visit Date: 01/20/24  Plan of Care reviewed with: patient    Additional Information:         Time Tracking:     Therapy Time  PT Received On: 01/19/24  PT Start Time: 1045  PT Stop Time: 1145  PT Total Time (min): 60 min   PT Individual: 60      Billable Minutes: Gait Training 45 and Therapeutic Activity 15    01/19/2024

## 2024-01-19 NOTE — ASSESSMENT & PLAN NOTE
Chronic, controlled. Latest blood pressure and vitals reviewed-     Temp:  [98.1 °F (36.7 °C)-98.4 °F (36.9 °C)]   Pulse:  []   Resp:  [18-20]   BP: (117-124)/(60-62)   SpO2:  [95 %-98 %] .   Home meds for hypertension were reviewed and noted below.       While in the hospital, will manage blood pressure as follows; Continue home antihypertensive regimen    Will utilize p.r.n. blood pressure medication only if patient's blood pressure greater than 140/90 and she develops symptoms such as worsening chest pain or shortness of breath.

## 2024-01-20 PROCEDURE — 97110 THERAPEUTIC EXERCISES: CPT

## 2024-01-20 PROCEDURE — 25000003 PHARM REV CODE 250: Performed by: STUDENT IN AN ORGANIZED HEALTH CARE EDUCATION/TRAINING PROGRAM

## 2024-01-20 PROCEDURE — 97530 THERAPEUTIC ACTIVITIES: CPT

## 2024-01-20 PROCEDURE — 97116 GAIT TRAINING THERAPY: CPT

## 2024-01-20 PROCEDURE — 11800000 HC REHAB PRIVATE ROOM

## 2024-01-20 RX ADMIN — ZIPRASIDONE HYDROCHLORIDE 40 MG: 40 CAPSULE ORAL at 08:01

## 2024-01-20 RX ADMIN — FAMOTIDINE 20 MG: 20 TABLET, FILM COATED ORAL at 08:01

## 2024-01-20 RX ADMIN — RIVAROXABAN 20 MG: 10 TABLET, FILM COATED ORAL at 04:01

## 2024-01-20 RX ADMIN — GABAPENTIN 300 MG: 300 CAPSULE ORAL at 08:01

## 2024-01-20 RX ADMIN — GABAPENTIN 300 MG: 300 CAPSULE ORAL at 02:01

## 2024-01-20 NOTE — PT/OT/SLP PROGRESS
Physical Therapy Inpatient Rehab Treatment    Patient Name:  Lupe Santizo   MRN:  [id number]    Recommendations:     Discharge Recommendations:  Low Intensity Therapy   Discharge Equipment Recommendations: walker, rolling, bedside commode, bath bench   Barriers to discharge: None    Assessment:     Lupe Santizo is a 43 y.o. female admitted with a medical diagnosis of Status post total left knee replacement.  She presents with the following impairments/functional limitations:     weakness, pain, cognitive deficits, impaired coordination, impaired balance, gait deviations, difficulty with functional transfers, difficulty with bed mobility, difficulty with wheelchair propulsion, impaired sensation, impaired proprioception, decreased motor control, decreased safety, and decreased endurance.Patient is deaf, she communicates via writing. Patient presents with 0/5 strength on the right ankle dorsiflexors, decrease active ROM on both ankle (can go to neutral only), hyperextension of the right knee passively done. Absent sensation on the right leg.        Patient was found sitting on the wheelchair, agreeable to therapy, still c/o pain on the left knee.  Ambulated ~5-32 feet with RW , decrease left knee extension and plantar flexed on the left knee during stance phase of the gait.  Achieve -20 (measured in supine)  to 90 degrees (measured in sitting) of left knee ROM.    Rehab Diagnosis:   Left TKA, WBAT   and CVA with residual deficits     Recent Surgery: * No surgery found *      General Precautions: Standard, deaf, fall     Orthopedic Precautions:LLE weight bearing as tolerated     Braces: N/A    Rehab Prognosis: Fair; patient would benefit from acute skilled PT services to address these deficits and reach maximum level of function.      History:     Past Medical History:   Diagnosis Date    Anticoagulant long-term use     Deaf     History of DVT (deep vein thrombosis)     Primary osteoarthritis of left knee         Past Surgical History:   Procedure Laterality Date    CHOLECYSTECTOMY      KNEE SURGERY      TOTAL KNEE ARTHROPLASTY Left 1/11/2024    Procedure: ARTHROPLASTY, KNEE, TOTAL;  Surgeon: Phoenix Godoy MD;  Location: [facility];  Service: Orthopedics;  Laterality: Left;       Subjective     Chief Complaint: Gestures pain on the left knee    Respiratory Status: Room air    Patients cultural, spiritual, Advent conflicts given the current situation: no      Objective:     Communicated with nurse, patient and PCT  prior to session.  Patient found up in chair with    upon PT entry to room.    Pt is Oriented x3 and Alert and Cooperative.      Pain Left knee rated 10/10        Functional Mobility:   Bed Mobility:     Rolling Left:  Supervision or touching assistance   Rolling Right: Supervision or touching assistance   Scooting: Supervision or touching assistance   Bridging: Supervision or touching assistance   Supine to Sit: Supervision or touching assistance   Sit to Supine: Supervision or touching assistance   Transfers:     Sit to Stand: Supervision or touching assistance  with rolling walker  Chair to mat: Supervision or touching assistance  with  rolling walker  using  Step Transfer  Gait: Pt ambulates ~5 feet, 10 feet and 32 feet  with RW with Partial/moderate assistance .   Balance: Static Sitting/Standing  Patient performed static standing on level surface using rolling walker with Supervision or touching assistance  and minimal verbal cues.    Static Sit: GOOD: Takes MODERATE challenges from all directions  Static Stand: FAIR+: Takes MINIMAL challenges from all directions     object from ground in standing position with reacher with rolling walker with Supervision or touching assistance   Wheelchair Propulsion:  Pt propelled Standard wheelchair x 300 feet on Level tile with  Bilateral upper extremity with Independent.      Current   Status  Discharge   Goal   Functional Area: Care Score:    Roll Left  and Right 4 Set-up/clean-up   Sit to Lying 4 Set-up/clean-up   Lying to Sitting on Side of Bed 4 Set-up/clean-up   Sit to Stand 4 Set-up/clean-up   Chair/Bed-to-Chair Transfer 4 Set-up/clean-up   Car Transfer 10 Supervision or touching assistance   Walk 10 Feet 3 Supervision or touching assistance   Walk 50 Feet with Two Turns 88 Supervision or touching assistance   Walk 150 Feet 88 Supervision or touching assistance   Walk 10 Feet Uneven Surface 88 Supervision or touching assistance   1 Step (Curb) 88 Supervision or touching assistance   4 Steps 88 Supervision or touching assistance   12 Steps 88 Not applicable   Picking Up Object 4 Set-up/clean-up   Wheel 50 Feet with Two Turns 6 Independent   Wheel 150 Feet 6 Independent       Therapeutic Activities and Exercises:  Wheelchair mobility using both UE  Gait training with RW on flat level surface   Picking up a small object from the floor   Sit <> stand with both UE support  Stand step transfer with a RW   Sit <> supine  Rolling side <> side  Supine <> prone   Standing balance   Patient education on DME needs, family training, care giver assistance and follow up therapy      SUPINE Left Lower Extremity   Active assisted  ROM Sets / Reps / Resistance / Etc.   Ankle PF and DF 3 x 10   Short Arc Quads 3 x 10    Quad Sets 3 x 10       Stretching of left knee towards extension with overpressure at end range as tolerated      Activity Tolerance: Fair    Patient left up in chair with call button in reach and nurse  notified.    Education provided: roles and goals of PT/PTA, transfer training, bed mob, gait training, balance training, safety awareness, assistive device, wheelchair management, and strengthening exercises    Expected compliance: Low compliance    GOALS:   Multidisciplinary Problems       Physical Therapy Goals          Problem: Physical Therapy    Goal Priority Disciplines Outcome Goal Variances Interventions   Physical Therapy Goal     PT, PT/OT Ongoing,  Progressing     Description:   Short Term Goals: 2024   Long Term Goals: 2024     Transfers Status    Sit to Stand  Short Term Goal: Complete sit to stand with Stand-by Assistance using Rolling Walker with minimal  verbal cues  Long Term Goal:  Complete sit to stand with Supervision or Set-up Assistance using Rolling Walker     Stand Step  Short Term Goal: Complete stand step with  Stand-by Assistance  using Rolling Walker with minimal   verbal cues  Long Term Goal:  Complete stand step with  Supervision or Set-up Assistance  using Rolling Walker     Supine <> Sit    Long Term Goal: Complete supine <> sit with Supervision or Set-up Assistance      Rolling Right/Left  Long Term Goal: Complete rolling  right/left with Supervision or Set-up Assistance     Balance/Coordination    Static Sitting  Long Term Goal: Complete static sitting with Modified Independent      Dynamic Sitting  Long Term Goal: Complete dynamic sitting with Supervision or Set-up Assistance  with  minimal  verbal cues minimal excursions    Static Standing  Short Term Goal: Complete static standing with  Stand-by Assistance with  minimal  verbal cues and RW  Long Term Goal: Complete static standing with Supervision or Set-up Assistance  with   minimal   verbal cues and RW    Dynamic Standing  Short Term Goal: Complete dynamic standing with Stand-by Assistance  with minimal   verbal cues minimal  excursions with RW  Long Term Goal: Complete dynamic standing with Supervision or Set-up Assistance with minimal   verbal cues and minimal  excursions with RW    Endurance    Short Term Goal:   Physical Therapy: 2 times a day, 30-45 minutes  Rest periods: multiple  Sitting: 3 hours/day    Long Term Goal:  Physical Therapy: 2 times a day, 45 minutes  Rest periods: minimal  Sittin-8 hours/day    Mobility/Gait  Short Term Goal: Will ambulate with Stand-by Assistance  Using Rolling Walker with minimal   verbal cues x 100 ft  Long Term Goal: Will  ambulate with Stand-by Assistance  using Rolling Walker with minimal  verbal cues x 150 ft    Stairs Assistance  Long Term Goal: Patient will ascend/descend 4 stairs/steps using both  handrails  nonreciprocal steps with Minimal Assistance and minimal  verbal cues    Wheelchair Skills/Surface  Long Term Goal: Patient will propel Standard wheelchair x 300 feet with Level tileWITH Bilateral upper extremity with Modified Independent       Ramp/Uneven 10 feet surface  Long  Term Goal; Patient will be able to navigate a 10 inch uneven surface with proper A.D. with  contact guard assistance/stand by assistance       2-6  inch curb  Long  Term Goal; Patient will be able to navigate a  2  inch curb with RW with  minimal assistance     Picking up object   Long  Term Goal; Patient will be able to  a small object from the floor  with a RW . with  stand by assistance     Car transfers  Long  Term Goal; Patient will be able to get in and out of a vehicle  with RW  with  minimal assistance     Education  Long Term Goals:  Patient/family/caregivers trained on physical mobility and precautions with Supervision.    Patient/family/caregivers trained on safety awareness with Supervision.    Order and obtain all appropriate equipment.                          Plan:     During this hospitalization, patient to be seen 5 x/week to address the identified rehab impairments via gait training, therapeutic activities, therapeutic exercises, neuromuscular re-education, wheelchair management/training and progress toward the following goals:    Plan of Care Expires:  01/31/24  PT Next Visit Date: 01/22/24  Plan of Care reviewed with: patient    Additional Information:         Time Tracking:     Therapy Time  PT Received On: 01/20/24  PT Start Time: 1200  PT Stop Time: 1245  PT Total Time (min): 45 min   PT Individual: 45    Billable Minutes: Gait Training 15, Therapeutic Activity 15, and Therapeutic Exercise 15    01/20/2024

## 2024-01-20 NOTE — PLAN OF CARE
Patient goals remain ongoing.  Patient in room with the start of shift, resting and watching TV.  Patient using sign language to indicate pain scale .  Patient is also using writing board to communicate information with staff.  Staff completes the writing and patient will usually sign to indicate by gesture her needs and wants.  Bandage in place to surgery site and should not be removed.  Patient indicated to staff by sign language that she was in pain at the surgery site and was given pain medication prior to bed.  Patient slept through the night with no concerns.

## 2024-01-20 NOTE — PLAN OF CARE
Problem: Rehabilitation ([facility]) Plan of Care  Goal: Plan of Care Review  Outcome: Ongoing, Progressing  Goal: Patient-Specific Goal (Individualized)  Outcome: Ongoing, Progressing  Goal: Absence of New-Onset Illness or Injury  Outcome: Ongoing, Progressing  Goal: Optimal Comfort and Wellbeing  Outcome: Ongoing, Progressing  Goal: Readiness for Transition of Care  Outcome: Ongoing, Progressing     Problem: Bariatric Environmental Safety  Goal: Safety Maintained with Care  Outcome: Ongoing, Progressing     Problem: Fall Injury Risk  Goal: Absence of Fall and Fall-Related Injury  Outcome: Ongoing, Progressing     Problem: Surgical Site Infection  Goal: Absence of Infection Signs and Symptoms  Outcome: Ongoing, Progressing     Problem: Mobility Impairment  Goal: Optimal Mobility  Outcome: Ongoing, Progressing   Will continue to monitor.

## 2024-01-21 LAB
ALBUMIN SERPL BCP-MCNC: 3 G/DL (ref 3.5–5.2)
ALP SERPL-CCNC: 70 U/L (ref 55–135)
ALT SERPL W/O P-5'-P-CCNC: 43 U/L (ref 10–44)
ANION GAP SERPL CALC-SCNC: 4 MMOL/L (ref 3–11)
AST SERPL-CCNC: 32 U/L (ref 10–40)
BASOPHILS # BLD AUTO: 0.06 K/UL (ref 0–0.2)
BASOPHILS NFR BLD: 0.7 % (ref 0–1.9)
BILIRUB SERPL-MCNC: 0.5 MG/DL (ref 0.1–1)
BUN SERPL-MCNC: 12 MG/DL (ref 6–20)
CALCIUM SERPL-MCNC: 9.2 MG/DL (ref 8.7–10.5)
CHLORIDE SERPL-SCNC: 109 MMOL/L (ref 95–110)
CO2 SERPL-SCNC: 28 MMOL/L (ref 23–29)
CREAT SERPL-MCNC: 0.8 MG/DL (ref 0.5–1.4)
DIFFERENTIAL METHOD BLD: ABNORMAL
EOSINOPHIL # BLD AUTO: 0.2 K/UL (ref 0–0.5)
EOSINOPHIL NFR BLD: 2.5 % (ref 0–8)
ERYTHROCYTE [DISTWIDTH] IN BLOOD BY AUTOMATED COUNT: 14 % (ref 11.5–14.5)
EST. GFR  (NO RACE VARIABLE): >60 ML/MIN/1.73 M^2
GLUCOSE SERPL-MCNC: 101 MG/DL (ref 70–110)
HCT VFR BLD AUTO: 35 % (ref 37–48.5)
HGB BLD-MCNC: 11.2 G/DL (ref 12–16)
IMM GRANULOCYTES # BLD AUTO: 0.02 K/UL (ref 0–0.04)
IMM GRANULOCYTES NFR BLD AUTO: 0.2 % (ref 0–0.5)
LYMPHOCYTES # BLD AUTO: 2.4 K/UL (ref 1–4.8)
LYMPHOCYTES NFR BLD: 27.3 % (ref 18–48)
MCH RBC QN AUTO: 26.4 PG (ref 27–31)
MCHC RBC AUTO-ENTMCNC: 32 G/DL (ref 32–36)
MCV RBC AUTO: 83 FL (ref 82–98)
MONOCYTES # BLD AUTO: 0.9 K/UL (ref 0.3–1)
MONOCYTES NFR BLD: 10.3 % (ref 4–15)
NEUTROPHILS # BLD AUTO: 5.1 K/UL (ref 1.8–7.7)
NEUTROPHILS NFR BLD: 59 % (ref 38–73)
NRBC BLD-RTO: 0 /100 WBC
PLATELET # BLD AUTO: 289 K/UL (ref 150–450)
PMV BLD AUTO: 11.1 FL (ref 9.2–12.9)
POTASSIUM SERPL-SCNC: 4 MMOL/L (ref 3.5–5.1)
PROT SERPL-MCNC: 6.8 G/DL (ref 6–8.4)
RBC # BLD AUTO: 4.24 M/UL (ref 4–5.4)
SODIUM SERPL-SCNC: 141 MMOL/L (ref 136–145)
WBC # BLD AUTO: 8.71 K/UL (ref 3.9–12.7)

## 2024-01-21 PROCEDURE — 99233 SBSQ HOSP IP/OBS HIGH 50: CPT | Mod: ,,, | Performed by: STUDENT IN AN ORGANIZED HEALTH CARE EDUCATION/TRAINING PROGRAM

## 2024-01-21 PROCEDURE — 80053 COMPREHEN METABOLIC PANEL: CPT | Performed by: INTERNAL MEDICINE

## 2024-01-21 PROCEDURE — 85025 COMPLETE CBC W/AUTO DIFF WBC: CPT | Performed by: INTERNAL MEDICINE

## 2024-01-21 PROCEDURE — 36415 COLL VENOUS BLD VENIPUNCTURE: CPT | Performed by: INTERNAL MEDICINE

## 2024-01-21 PROCEDURE — 11800000 HC REHAB PRIVATE ROOM

## 2024-01-21 PROCEDURE — 25000003 PHARM REV CODE 250: Performed by: INTERNAL MEDICINE

## 2024-01-21 PROCEDURE — 25000003 PHARM REV CODE 250: Performed by: STUDENT IN AN ORGANIZED HEALTH CARE EDUCATION/TRAINING PROGRAM

## 2024-01-21 RX ADMIN — OXYCODONE HYDROCHLORIDE 10 MG: 10 TABLET ORAL at 08:01

## 2024-01-21 RX ADMIN — FAMOTIDINE 20 MG: 20 TABLET, FILM COATED ORAL at 08:01

## 2024-01-21 RX ADMIN — GABAPENTIN 300 MG: 300 CAPSULE ORAL at 08:01

## 2024-01-21 RX ADMIN — RIVAROXABAN 20 MG: 10 TABLET, FILM COATED ORAL at 04:01

## 2024-01-21 RX ADMIN — ZIPRASIDONE HYDROCHLORIDE 40 MG: 40 CAPSULE ORAL at 08:01

## 2024-01-21 RX ADMIN — GABAPENTIN 300 MG: 300 CAPSULE ORAL at 02:01

## 2024-01-21 RX ADMIN — METHOCARBAMOL TABLETS 750 MG: 750 TABLET, COATED ORAL at 08:01

## 2024-01-21 NOTE — PLAN OF CARE
Problem: Rehabilitation ([facility]) Plan of Care  Goal: Plan of Care Review  Outcome: Ongoing, Progressing  Goal: Patient-Specific Goal (Individualized)  Outcome: Ongoing, Progressing  Goal: Absence of New-Onset Illness or Injury  Outcome: Ongoing, Progressing  Goal: Optimal Comfort and Wellbeing  Outcome: Ongoing, Progressing  Goal: Readiness for Transition of Care  Outcome: Ongoing, Progressing     Problem: Bariatric Environmental Safety  Goal: Safety Maintained with Care  Outcome: Ongoing, Progressing     Problem: Fall Injury Risk  Goal: Absence of Fall and Fall-Related Injury  Outcome: Ongoing, Progressing     Problem: Skin Injury Risk Increased  Goal: Skin Health and Integrity  Outcome: Ongoing, Progressing     Problem: Pain Acute  Goal: Acceptable Pain Control and Functional Ability  Outcome: Ongoing, Progressing     Problem: Surgical Site Infection  Goal: Absence of Infection Signs and Symptoms  Outcome: Ongoing, Progressing     Problem: Mobility Impairment  Goal: Optimal Mobility  Outcome: Ongoing, Progressing   Will continue to monitor and will maintain a safe environment.

## 2024-01-21 NOTE — SUBJECTIVE & OBJECTIVE
Interval History: patient seen and examined.     Review of Systems   Constitutional:  Positive for activity change and fatigue. Negative for appetite change, chills, diaphoresis, fever and unexpected weight change.   HENT:  Positive for hearing loss. Negative for congestion, dental problem, drooling, facial swelling, mouth sores, nosebleeds, sore throat and trouble swallowing.    Eyes:  Negative for visual disturbance.   Respiratory:  Negative for cough, shortness of breath and wheezing.    Cardiovascular:  Negative for chest pain, palpitations and leg swelling.   Gastrointestinal:  Negative for abdominal distention, abdominal pain, blood in stool, constipation, diarrhea, nausea and vomiting.   Endocrine: Negative for polyphagia.   Genitourinary:  Negative for difficulty urinating, dysuria, flank pain and frequency.   Musculoskeletal:  Positive for arthralgias, gait problem, joint swelling and myalgias. Negative for back pain, neck pain and neck stiffness.   Skin:  Negative for rash.        Surgical incision site L knee   Allergic/Immunologic: Negative.    Neurological:  Positive for speech difficulty and weakness. Negative for dizziness, tremors, seizures, syncope, facial asymmetry, light-headedness, numbness and headaches.   Hematological:  Negative for adenopathy. Bruises/bleeds easily.   Psychiatric/Behavioral:  Negative for agitation, confusion and hallucinations. The patient is not nervous/anxious.      Objective:     Vital Signs (Most Recent):  Temp: 98 °F (36.7 °C) (01/21/24 1300)  Pulse: 92 (01/21/24 1300)  Resp: 18 (01/21/24 1300)  BP: (!) 132/93 (01/21/24 1300)  SpO2: 98 % (01/21/24 1300) Vital Signs (24h Range):  Temp:  [97.8 °F (36.6 °C)-98.6 °F (37 °C)] 98 °F (36.7 °C)  Pulse:  [88-99] 92  Resp:  [18-20] 18  SpO2:  [98 %-100 %] 98 %  BP: (113-132)/(57-93) 132/93     Weight: 94.4 kg (208 lb 1.8 oz)  Body mass index is 35.72 kg/m².    Intake/Output Summary (Last 24 hours) at 1/21/2024 2492  Last data  filed at 1/21/2024 1200  Gross per 24 hour   Intake 960 ml   Output 1000 ml   Net -40 ml         Physical Exam  Vitals and nursing note reviewed.   Constitutional:       General: She is awake. She is not in acute distress.     Appearance: Normal appearance. She is obese. She is not ill-appearing, toxic-appearing or diaphoretic.   HENT:      Head: Normocephalic and atraumatic.      Right Ear: External ear normal. Decreased hearing noted.      Left Ear: External ear normal. Decreased hearing noted.      Nose: Nose normal. No congestion or rhinorrhea.      Mouth/Throat:      Mouth: Mucous membranes are moist.      Pharynx: Oropharynx is clear. No oropharyngeal exudate or posterior oropharyngeal erythema.   Eyes:      General: No scleral icterus.        Right eye: No discharge.         Left eye: No discharge.      Extraocular Movements: Extraocular movements intact.      Conjunctiva/sclera: Conjunctivae normal.      Pupils: Pupils are equal, round, and reactive to light.   Neck:      Thyroid: No thyroid mass or thyromegaly.      Vascular: No carotid bruit.      Meningeal: Brudzinski's sign and Kernig's sign absent.   Cardiovascular:      Rate and Rhythm: Normal rate and regular rhythm.      Chest Wall: PMI is not displaced. No thrill.      Pulses: Normal pulses.      Heart sounds: Normal heart sounds. No murmur heard.     No friction rub. No gallop.   Pulmonary:      Effort: Pulmonary effort is normal. No tachypnea, accessory muscle usage, prolonged expiration or respiratory distress.      Breath sounds: Normal breath sounds. No stridor or decreased air movement. No wheezing, rhonchi or rales.   Chest:      Chest wall: No tenderness.   Abdominal:      General: Bowel sounds are normal. There is no distension.      Palpations: Abdomen is soft. There is no hepatomegaly, splenomegaly or mass.      Tenderness: There is no abdominal tenderness. There is no right CVA tenderness, left CVA tenderness, guarding or rebound.       Hernia: No hernia is present.   Musculoskeletal:         General: Tenderness present. No swelling or deformity.      Cervical back: Neck supple. No rigidity. No muscular tenderness.      Right lower leg: No edema.      Left lower leg: Edema present.      Comments: Left knee with dressing, under intact tegaderm, no excessive warmth to touch   Lymphadenopathy:      Cervical: No cervical adenopathy.   Skin:     General: Skin is warm.      Capillary Refill: Capillary refill takes less than 2 seconds.      Coloration: Skin is not cyanotic, jaundiced or pale.      Findings: No erythema, petechiae or rash.   Neurological:      Mental Status: She is alert and oriented to person, place, and time. Mental status is at baseline.      Cranial Nerves: Cranial nerve deficit present. No dysarthria or facial asymmetry.      Motor: Weakness present. No tremor.      Gait: Gait abnormal.   Psychiatric:         Mood and Affect: Mood normal. Mood is not anxious or depressed. Affect is not flat.         Speech: Speech is not rapid and pressured or slurred.         Behavior: Behavior normal. Behavior is not agitated, aggressive or combative.         Thought Content: Thought content normal. Thought content is not paranoid or delusional.         Cognition and Memory: Cognition is not impaired. Memory is not impaired.      Comments: + learning impairment, + deaf            Significant Labs: All pertinent labs within the past 24 hours have been reviewed.  Bilirubin:   Recent Labs   Lab 01/18/24  0606 01/21/24  0629   BILITOT 0.4 0.5     BMP:   Recent Labs   Lab 01/21/24  0629         K 4.0      CO2 28   BUN 12   CREATININE 0.8   CALCIUM 9.2     CBC:   Recent Labs   Lab 01/21/24  0629   WBC 8.71   HGB 11.2*   HCT 35.0*        CMP:   Recent Labs   Lab 01/21/24  0629      K 4.0      CO2 28      BUN 12   CREATININE 0.8   CALCIUM 9.2   PROT 6.8   ALBUMIN 3.0*   BILITOT 0.5   ALKPHOS 70   AST 32   ALT 43    ANIONGAP 4        Significant Imaging: I have reviewed all pertinent imaging results/findings within the past 24 hours.

## 2024-01-21 NOTE — PROGRESS NOTES
[facility] Medicine  Progress Note    Patient Name: Lupe Santizo  MRN: [id number]  Patient Class: IP- Rehab   Admission Date: 1/16/2024  Length of Stay: 6 days  Attending Physician: Ricardo Mckeon III, MD  Primary Care Provider: Alicia, Primary Doctor        Subjective:     Principal Problem:Status post total left knee replacement        HPI:  History of present illness:      Lupe Santizo is a 43 y.o. female who presents for evaluation of their left knee pain. Mother reports daughter's pain has been present for years. Patient is both unable to hear and unable to speak, therefore history is from mother who is also MPOA. Patient also has a learning disability. Mother reports that the knee is the primary source of pain for the patient. Pain is mostly located medially. She had an injection at her last visit which gave her temporarily relief. She is on Xarelto for a DVT following gallbladder surgery, which she will be on for life. Patient returns today for f/u of L knee OA. She has since obtained clearance form her PCP and cardiologist which shows that she is low to intermediate risk and is optimized for surgery and okay to hold Xarelto 3 days preop that she is a high risk for recurrent DVT. She would like to proceed with TKA.      Patient underwent a successful left total knee arthroplasty and postoperatively her recovery has been uneventful.  Her care team reached out to us because of her learning impairment and communication challenges and felt that it was safest for her to recover and an inpatient rehab setting to avoid further complications and morbidity.  I have seen and evaluated the patient this morning we are communicating through a tablet patient is smiling her mood seems to be good she is complaining of pain at the knee her surgical site seems clear her dressing does have some serosanguineous discharge.  Patient's chart has been reviewed and reconciled all medications updated.      Pt. Requires acute inpatient rehab admission with 24-hour nursing and active physician oversight to monitor and manage acute medical comorbid conditions, labs, pain, and functional deficits. Patient/family will also require teaching and integration of improving functional skills into daily living. She will also require an individualized, interdisciplinary approach to her care, receiving PT, OT services 3 hours per day, 5 days per week. Required care cannot be provided at a lower level of care. Patient is anticipated to require approximately 10-14 days LOS with expected discharge home with mother and with      Impairment group (IGC):   Unilateral Kneee Replacements 08.61 Etiologic diagnosis/description:   M17.12: Osteoarthritis of left knee   Date of onset:  1/11/2024 Date of surgery:  1/11/2024   Allergies: Patient has no known allergies.   Comorbid condition: Deaf, hx of CVA, unable to speak, hx of DVT, OA   Medical/functional conditions requiring inpatient rehabilitation:      This patient requires medical management/24-hour nursing of complex co morbidities Deaf, hx of CVA, unable to speak, hx of DVT, OA, labs, medications (see medications list), pain, sleep hygiene, anticoagulation, nutrition, hydration, neurological,  and preventive healthcare.     This patient requires intense therapy and an integrated, interdisciplinary approach to address safety, impaired mobility, impaired ADLs (dressing, toileting, grooming, showering), judgment, and memory, communication, bowel/bladder problems,preventive healthcare, medication management, integration of functional skills into daily living, and home caregiver support and training.      Risk for medical/clinical complications:      This patient is at risk for the following complications: DVT/PE, pneumonia, malnutrition, worsening activity intolerance, complications from anticoagulation,  skin breakdown, inadequate sleep, recurring stroke, and constipation.         Overview/Hospital Course:  01/18 CP: Pt c/o pain/soreness after working with therapy. Also reports loose stools. Med orders placed. Pt is in good spirits and working hard with PT this am. Encouraged good therapy efforts. Unable to visualize surgical site today.  1/19 DL:  Patient doing well.  No acute events reported.  Labs and vital signs stable.  Patient doing well with therapy.  Encouraged to continue great therapy efforts.  1/21 [place] weekend crossover. No reported concerns with therapy efforts.     Interval History: patient seen and examined.     Review of Systems   Constitutional:  Positive for activity change and fatigue. Negative for appetite change, chills, diaphoresis, fever and unexpected weight change.   HENT:  Positive for hearing loss. Negative for congestion, dental problem, drooling, facial swelling, mouth sores, nosebleeds, sore throat and trouble swallowing.    Eyes:  Negative for visual disturbance.   Respiratory:  Negative for cough, shortness of breath and wheezing.    Cardiovascular:  Negative for chest pain, palpitations and leg swelling.   Gastrointestinal:  Negative for abdominal distention, abdominal pain, blood in stool, constipation, diarrhea, nausea and vomiting.   Endocrine: Negative for polyphagia.   Genitourinary:  Negative for difficulty urinating, dysuria, flank pain and frequency.   Musculoskeletal:  Positive for arthralgias, gait problem, joint swelling and myalgias. Negative for back pain, neck pain and neck stiffness.   Skin:  Negative for rash.        Surgical incision site L knee   Allergic/Immunologic: Negative.    Neurological:  Positive for speech difficulty and weakness. Negative for dizziness, tremors, seizures, syncope, facial asymmetry, light-headedness, numbness and headaches.   Hematological:  Negative for adenopathy. Bruises/bleeds easily.   Psychiatric/Behavioral:  Negative for agitation, confusion and hallucinations. The patient is not nervous/anxious.       Objective:     Vital Signs (Most Recent):  Temp: 98 °F (36.7 °C) (01/21/24 1300)  Pulse: 92 (01/21/24 1300)  Resp: 18 (01/21/24 1300)  BP: (!) 132/93 (01/21/24 1300)  SpO2: 98 % (01/21/24 1300) Vital Signs (24h Range):  Temp:  [97.8 °F (36.6 °C)-98.6 °F (37 °C)] 98 °F (36.7 °C)  Pulse:  [88-99] 92  Resp:  [18-20] 18  SpO2:  [98 %-100 %] 98 %  BP: (113-132)/(57-93) 132/93     Weight: 94.4 kg (208 lb 1.8 oz)  Body mass index is 35.72 kg/m².    Intake/Output Summary (Last 24 hours) at 1/21/2024 1732  Last data filed at 1/21/2024 1200  Gross per 24 hour   Intake 960 ml   Output 1000 ml   Net -40 ml         Physical Exam  Vitals and nursing note reviewed.   Constitutional:       General: She is awake. She is not in acute distress.     Appearance: Normal appearance. She is obese. She is not ill-appearing, toxic-appearing or diaphoretic.   HENT:      Head: Normocephalic and atraumatic.      Right Ear: External ear normal. Decreased hearing noted.      Left Ear: External ear normal. Decreased hearing noted.      Nose: Nose normal. No congestion or rhinorrhea.      Mouth/Throat:      Mouth: Mucous membranes are moist.      Pharynx: Oropharynx is clear. No oropharyngeal exudate or posterior oropharyngeal erythema.   Eyes:      General: No scleral icterus.        Right eye: No discharge.         Left eye: No discharge.      Extraocular Movements: Extraocular movements intact.      Conjunctiva/sclera: Conjunctivae normal.      Pupils: Pupils are equal, round, and reactive to light.   Neck:      Thyroid: No thyroid mass or thyromegaly.      Vascular: No carotid bruit.      Meningeal: Brudzinski's sign and Kernig's sign absent.   Cardiovascular:      Rate and Rhythm: Normal rate and regular rhythm.      Chest Wall: PMI is not displaced. No thrill.      Pulses: Normal pulses.      Heart sounds: Normal heart sounds. No murmur heard.     No friction rub. No gallop.   Pulmonary:      Effort: Pulmonary effort is normal. No  tachypnea, accessory muscle usage, prolonged expiration or respiratory distress.      Breath sounds: Normal breath sounds. No stridor or decreased air movement. No wheezing, rhonchi or rales.   Chest:      Chest wall: No tenderness.   Abdominal:      General: Bowel sounds are normal. There is no distension.      Palpations: Abdomen is soft. There is no hepatomegaly, splenomegaly or mass.      Tenderness: There is no abdominal tenderness. There is no right CVA tenderness, left CVA tenderness, guarding or rebound.      Hernia: No hernia is present.   Musculoskeletal:         General: Tenderness present. No swelling or deformity.      Cervical back: Neck supple. No rigidity. No muscular tenderness.      Right lower leg: No edema.      Left lower leg: Edema present.      Comments: Left knee with dressing, under intact tegaderm, no excessive warmth to touch   Lymphadenopathy:      Cervical: No cervical adenopathy.   Skin:     General: Skin is warm.      Capillary Refill: Capillary refill takes less than 2 seconds.      Coloration: Skin is not cyanotic, jaundiced or pale.      Findings: No erythema, petechiae or rash.   Neurological:      Mental Status: She is alert and oriented to person, place, and time. Mental status is at baseline.      Cranial Nerves: Cranial nerve deficit present. No dysarthria or facial asymmetry.      Motor: Weakness present. No tremor.      Gait: Gait abnormal.   Psychiatric:         Mood and Affect: Mood normal. Mood is not anxious or depressed. Affect is not flat.         Speech: Speech is not rapid and pressured or slurred.         Behavior: Behavior normal. Behavior is not agitated, aggressive or combative.         Thought Content: Thought content normal. Thought content is not paranoid or delusional.         Cognition and Memory: Cognition is not impaired. Memory is not impaired.      Comments: + learning impairment, + deaf            Significant Labs: All pertinent labs within the past 24  hours have been reviewed.  Bilirubin:   Recent Labs   Lab 01/18/24  0606 01/21/24  0629   BILITOT 0.4 0.5     BMP:   Recent Labs   Lab 01/21/24  0629         K 4.0      CO2 28   BUN 12   CREATININE 0.8   CALCIUM 9.2     CBC:   Recent Labs   Lab 01/21/24  0629   WBC 8.71   HGB 11.2*   HCT 35.0*        CMP:   Recent Labs   Lab 01/21/24  0629      K 4.0      CO2 28      BUN 12   CREATININE 0.8   CALCIUM 9.2   PROT 6.8   ALBUMIN 3.0*   BILITOT 0.5   ALKPHOS 70   AST 32   ALT 43   ANIONGAP 4        Significant Imaging: I have reviewed all pertinent imaging results/findings within the past 24 hours.    Assessment/Plan:      * Status post total left knee replacement  Patient's pain seems to be fairly well controlled postoperatively agree with current regimen and adjust as needed.  PT/OT evaluations reviewed.  1/21 Left knee pain on scale of 6 out of 10, after pain meds, pain reduced to 2 out of 10.       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      Essential hypertension  Chronic, controlled. Latest blood pressure and vitals reviewed-   BP Readings from Last 3 Encounters:   01/21/24 (!) 132/93   01/16/24 115/75   12/18/23 (!) 146/91      Temp:  [97.8 °F (36.6 °C)-98.6 °F (37 °C)]   Pulse:  [88-99]   Resp:  [18-20]   BP: (113-132)/(57-93)   SpO2:  [98 %-100 %] .   Home meds for hypertension were reviewed and noted below.     While in the hospital, will manage blood pressure as follows; Continue home antihypertensive regimen    Will utilize p.r.n. blood pressure medication only if patient's blood pressure greater than 140/90 and she develops symptoms such as worsening chest pain or shortness of breath.    Bilateral deafness  Patient able to communicate with  tablet and has some basic reading and writing skills.      Obesity (BMI 30-39.9)  Body mass index is 35.72 kg/m². Morbid obesity complicates all aspects of disease management from diagnostic modalities to treatment. Weight loss encouraged and health benefits explained to patient.         Primary osteoarthritis of left knee  Patient's pain seems to be fairly well controlled postoperatively agree with current regimen and adjust as needed.        VTE Risk Mitigation (From admission, onward)           Ordered     rivaroxaban tablet 20 mg  with dinner         01/16/24 1435     IP VTE LOW RISK PATIENT  Once         01/16/24 1435     Place sequential compression device  Until discontinued         01/16/24 1435                    Discharge Planning   FRED:      Code Status: Full Code   Is the patient medically ready for discharge?:     Reason for patient still in hospital (select all that apply): Patient trending condition, Treatment, and PT / OT recommendations  Discharge Plan A: Home with family                  Kalee Martinez DO  Department of Hospital Medicine   [facility] ([facility])

## 2024-01-21 NOTE — ASSESSMENT & PLAN NOTE
Chronic, controlled. Latest blood pressure and vitals reviewed-   BP Readings from Last 3 Encounters:   01/21/24 (!) 132/93   01/16/24 115/75   12/18/23 (!) 146/91      Temp:  [97.8 °F (36.6 °C)-98.6 °F (37 °C)]   Pulse:  [88-99]   Resp:  [18-20]   BP: (113-132)/(57-93)   SpO2:  [98 %-100 %] .   Home meds for hypertension were reviewed and noted below.     While in the hospital, will manage blood pressure as follows; Continue home antihypertensive regimen    Will utilize p.r.n. blood pressure medication only if patient's blood pressure greater than 140/90 and she develops symptoms such as worsening chest pain or shortness of breath.

## 2024-01-21 NOTE — ASSESSMENT & PLAN NOTE
Patient's pain seems to be fairly well controlled postoperatively agree with current regimen and adjust as needed.  PT/OT evaluations reviewed.  1/21 Left knee pain on scale of 6 out of 10, after pain meds, pain reduced to 2 out of 10.

## 2024-01-22 PROCEDURE — 97116 GAIT TRAINING THERAPY: CPT

## 2024-01-22 PROCEDURE — 97530 THERAPEUTIC ACTIVITIES: CPT

## 2024-01-22 PROCEDURE — 97110 THERAPEUTIC EXERCISES: CPT

## 2024-01-22 PROCEDURE — 25000003 PHARM REV CODE 250: Performed by: INTERNAL MEDICINE

## 2024-01-22 PROCEDURE — 97542 WHEELCHAIR MNGMENT TRAINING: CPT

## 2024-01-22 PROCEDURE — 25000003 PHARM REV CODE 250

## 2024-01-22 PROCEDURE — 25000003 PHARM REV CODE 250: Performed by: STUDENT IN AN ORGANIZED HEALTH CARE EDUCATION/TRAINING PROGRAM

## 2024-01-22 PROCEDURE — 11800000 HC REHAB PRIVATE ROOM

## 2024-01-22 PROCEDURE — 97535 SELF CARE MNGMENT TRAINING: CPT

## 2024-01-22 RX ADMIN — FAMOTIDINE 20 MG: 20 TABLET, FILM COATED ORAL at 08:01

## 2024-01-22 RX ADMIN — SENNOSIDES AND DOCUSATE SODIUM 1 TABLET: 8.6; 5 TABLET ORAL at 08:01

## 2024-01-22 RX ADMIN — POLYETHYLENE GLYCOL (3350) 17 G: 17 POWDER, FOR SOLUTION ORAL at 08:01

## 2024-01-22 RX ADMIN — GABAPENTIN 300 MG: 300 CAPSULE ORAL at 08:01

## 2024-01-22 RX ADMIN — OXYCODONE HYDROCHLORIDE 5 MG: 5 TABLET ORAL at 01:01

## 2024-01-22 RX ADMIN — GABAPENTIN 300 MG: 300 CAPSULE ORAL at 03:01

## 2024-01-22 RX ADMIN — RIVAROXABAN 20 MG: 10 TABLET, FILM COATED ORAL at 05:01

## 2024-01-22 RX ADMIN — OXYCODONE HYDROCHLORIDE 5 MG: 5 TABLET ORAL at 03:01

## 2024-01-22 RX ADMIN — OXYCODONE HYDROCHLORIDE 5 MG: 5 TABLET ORAL at 08:01

## 2024-01-22 RX ADMIN — ZIPRASIDONE HYDROCHLORIDE 40 MG: 40 CAPSULE ORAL at 08:01

## 2024-01-22 RX ADMIN — METHOCARBAMOL TABLETS 750 MG: 750 TABLET, COATED ORAL at 08:01

## 2024-01-22 NOTE — SUBJECTIVE & OBJECTIVE
Interval History: patient seen and examined.     Review of Systems   Constitutional:  Positive for activity change and fatigue. Negative for appetite change, chills, diaphoresis, fever and unexpected weight change.   HENT:  Positive for hearing loss. Negative for congestion, dental problem, drooling, facial swelling, mouth sores, nosebleeds, sore throat and trouble swallowing.    Eyes:  Negative for visual disturbance.   Respiratory:  Negative for cough, shortness of breath and wheezing.    Cardiovascular:  Negative for chest pain, palpitations and leg swelling.   Gastrointestinal:  Negative for abdominal distention, abdominal pain, blood in stool, constipation, diarrhea, nausea and vomiting.   Endocrine: Negative for polyphagia.   Genitourinary:  Negative for difficulty urinating, dysuria, flank pain and frequency.   Musculoskeletal:  Positive for arthralgias, gait problem, joint swelling and myalgias. Negative for back pain, neck pain and neck stiffness.   Skin:  Negative for rash.        Surgical incision site L knee   Allergic/Immunologic: Negative.    Neurological:  Positive for speech difficulty and weakness. Negative for dizziness, tremors, seizures, syncope, facial asymmetry, light-headedness, numbness and headaches.   Hematological:  Negative for adenopathy. Bruises/bleeds easily.   Psychiatric/Behavioral:  Negative for agitation, confusion and hallucinations. The patient is not nervous/anxious.      Objective:     Vital Signs (Most Recent):  Temp: 97.9 °F (36.6 °C) (01/22/24 0550)  Pulse: 85 (01/22/24 0550)  Resp: 18 (01/22/24 0831)  BP: (!) 107/58 (01/22/24 0550)  SpO2: 96 % (01/22/24 0550) Vital Signs (24h Range):  Temp:  [97.9 °F (36.6 °C)-98 °F (36.7 °C)] 97.9 °F (36.6 °C)  Pulse:  [85-92] 85  Resp:  [16-18] 18  SpO2:  [96 %-98 %] 96 %  BP: (107-132)/(58-93) 107/58     Weight: 94.4 kg (208 lb 1.8 oz)  Body mass index is 35.72 kg/m².    Intake/Output Summary (Last 24 hours) at 1/22/2024 1240  Last data  filed at 1/22/2024 1234  Gross per 24 hour   Intake 1600 ml   Output --   Net 1600 ml           Physical Exam  Vitals and nursing note reviewed.   Constitutional:       General: She is awake. She is not in acute distress.     Appearance: Normal appearance. She is obese. She is not ill-appearing, toxic-appearing or diaphoretic.   HENT:      Head: Normocephalic and atraumatic.      Right Ear: External ear normal. Decreased hearing noted.      Left Ear: External ear normal. Decreased hearing noted.      Nose: Nose normal. No congestion or rhinorrhea.      Mouth/Throat:      Mouth: Mucous membranes are moist.      Pharynx: Oropharynx is clear. No oropharyngeal exudate or posterior oropharyngeal erythema.   Eyes:      General: No scleral icterus.        Right eye: No discharge.         Left eye: No discharge.      Extraocular Movements: Extraocular movements intact.      Conjunctiva/sclera: Conjunctivae normal.      Pupils: Pupils are equal, round, and reactive to light.   Neck:      Thyroid: No thyroid mass or thyromegaly.      Vascular: No carotid bruit.      Meningeal: Brudzinski's sign and Kernig's sign absent.   Cardiovascular:      Rate and Rhythm: Normal rate and regular rhythm.      Chest Wall: PMI is not displaced. No thrill.      Pulses: Normal pulses.      Heart sounds: Normal heart sounds. No murmur heard.     No friction rub. No gallop.   Pulmonary:      Effort: Pulmonary effort is normal. No tachypnea, accessory muscle usage, prolonged expiration or respiratory distress.      Breath sounds: Normal breath sounds. No stridor or decreased air movement. No wheezing, rhonchi or rales.   Chest:      Chest wall: No tenderness.   Abdominal:      General: Bowel sounds are normal. There is no distension.      Palpations: Abdomen is soft. There is no hepatomegaly, splenomegaly or mass.      Tenderness: There is no abdominal tenderness. There is no right CVA tenderness, left CVA tenderness, guarding or rebound.       Hernia: No hernia is present.   Musculoskeletal:         General: Tenderness present. No swelling or deformity.      Cervical back: Neck supple. No rigidity. No muscular tenderness.      Right lower leg: No edema.      Left lower leg: Edema present.      Comments: Left knee with dressing, under intact tegaderm, no excessive warmth to touch   Lymphadenopathy:      Cervical: No cervical adenopathy.   Skin:     General: Skin is warm.      Capillary Refill: Capillary refill takes less than 2 seconds.      Coloration: Skin is not cyanotic, jaundiced or pale.      Findings: No erythema, petechiae or rash.   Neurological:      Mental Status: She is alert and oriented to person, place, and time. Mental status is at baseline.      Cranial Nerves: Cranial nerve deficit present. No dysarthria or facial asymmetry.      Motor: Weakness present. No tremor.      Gait: Gait abnormal.   Psychiatric:         Mood and Affect: Mood normal. Mood is not anxious or depressed. Affect is not flat.         Speech: Speech is not rapid and pressured or slurred.         Behavior: Behavior normal. Behavior is not agitated, aggressive or combative.         Thought Content: Thought content normal. Thought content is not paranoid or delusional.         Cognition and Memory: Cognition is not impaired. Memory is not impaired.      Comments: + learning impairment, + deaf            Significant Labs: All pertinent labs within the past 24 hours have been reviewed.  Bilirubin:   Recent Labs   Lab 01/18/24  0606 01/21/24  0629   BILITOT 0.4 0.5       BMP:   Recent Labs   Lab 01/21/24  0629         K 4.0      CO2 28   BUN 12   CREATININE 0.8   CALCIUM 9.2       CBC:   Recent Labs   Lab 01/21/24  0629   WBC 8.71   HGB 11.2*   HCT 35.0*          CMP:   Recent Labs   Lab 01/21/24  0629      K 4.0      CO2 28      BUN 12   CREATININE 0.8   CALCIUM 9.2   PROT 6.8   ALBUMIN 3.0*   BILITOT 0.5   ALKPHOS 70   AST 32    ALT 43   ANIONGAP 4          Significant Imaging: I have reviewed all pertinent imaging results/findings within the past 24 hours.

## 2024-01-22 NOTE — PLAN OF CARE
Problem: Occupational Therapy  Goal: Occupational Therapy Goal  Description:  Long Term Goals to be met by: 01/30/24     Patient will increase functional independence with ADLs by performing:    Feeding with [place].  UE Dressing with Modified [place].  LE Dressing with Supervision.  Grooming while seated at sink with Modified [place].  Toileting from toilet with Set-up Assistance for hygiene and clothing management.   Bathing from  shower chair/bench with Set-up Assistance.  Toilet transfer to toilet with Set-up Assistance.  Increased functional strength to 4+/5 to 5/5 for bilateral UE's.    Outcome: Ongoing, Progressing

## 2024-01-22 NOTE — ASSESSMENT & PLAN NOTE
Chronic, controlled. Latest blood pressure and vitals reviewed-   BP Readings from Last 3 Encounters:   01/22/24 (!) 107/58   01/16/24 115/75   12/18/23 (!) 146/91      Temp:  [97.9 °F (36.6 °C)-98 °F (36.7 °C)]   Pulse:  [85-92]   Resp:  [16-18]   BP: (107-132)/(58-93)   SpO2:  [96 %-98 %] .   Home meds for hypertension were reviewed and noted below.     While in the hospital, will manage blood pressure as follows; Continue home antihypertensive regimen    Will utilize p.r.n. blood pressure medication only if patient's blood pressure greater than 140/90 and she develops symptoms such as worsening chest pain or shortness of breath.

## 2024-01-22 NOTE — PROGRESS NOTES
[facility] Medicine  Progress Note    Patient Name: Lupe Santizo  MRN: [id number]  Patient Class: IP- Rehab   Admission Date: 1/16/2024  Length of Stay: 6 days  Attending Physician: Ricardo Mckeon III, MD  Primary Care Provider: Alicia, Primary Doctor        Subjective:     Principal Problem:Status post total left knee replacement        HPI:  History of present illness:      Lupe Santizo is a 43 y.o. female who presents for evaluation of their left knee pain. Mother reports daughter's pain has been present for years. Patient is both unable to hear and unable to speak, therefore history is from mother who is also MPOA. Patient also has a learning disability. Mother reports that the knee is the primary source of pain for the patient. Pain is mostly located medially. She had an injection at her last visit which gave her temporarily relief. She is on Xarelto for a DVT following gallbladder surgery, which she will be on for life. Patient returns today for f/u of L knee OA. She has since obtained clearance form her PCP and cardiologist which shows that she is low to intermediate risk and is optimized for surgery and okay to hold Xarelto 3 days preop that she is a high risk for recurrent DVT. She would like to proceed with TKA.      Patient underwent a successful left total knee arthroplasty and postoperatively her recovery has been uneventful.  Her care team reached out to us because of her learning impairment and communication challenges and felt that it was safest for her to recover and an inpatient rehab setting to avoid further complications and morbidity.  I have seen and evaluated the patient this morning we are communicating through a tablet patient is smiling her mood seems to be good she is complaining of pain at the knee her surgical site seems clear her dressing does have some serosanguineous discharge.  Patient's chart has been reviewed and reconciled all medications updated.      Pt. Requires acute inpatient rehab admission with 24-hour nursing and active physician oversight to monitor and manage acute medical comorbid conditions, labs, pain, and functional deficits. Patient/family will also require teaching and integration of improving functional skills into daily living. She will also require an individualized, interdisciplinary approach to her care, receiving PT, OT services 3 hours per day, 5 days per week. Required care cannot be provided at a lower level of care. Patient is anticipated to require approximately 10-14 days LOS with expected discharge home with mother and with      Impairment group (IGC):   Unilateral Kneee Replacements 08.61 Etiologic diagnosis/description:   M17.12: Osteoarthritis of left knee   Date of onset:  1/11/2024 Date of surgery:  1/11/2024   Allergies: Patient has no known allergies.   Comorbid condition: Deaf, hx of CVA, unable to speak, hx of DVT, OA   Medical/functional conditions requiring inpatient rehabilitation:      This patient requires medical management/24-hour nursing of complex co morbidities Deaf, hx of CVA, unable to speak, hx of DVT, OA, labs, medications (see medications list), pain, sleep hygiene, anticoagulation, nutrition, hydration, neurological,  and preventive healthcare.     This patient requires intense therapy and an integrated, interdisciplinary approach to address safety, impaired mobility, impaired ADLs (dressing, toileting, grooming, showering), judgment, and memory, communication, bowel/bladder problems,preventive healthcare, medication management, integration of functional skills into daily living, and home caregiver support and training.      Risk for medical/clinical complications:      This patient is at risk for the following complications: DVT/PE, pneumonia, malnutrition, worsening activity intolerance, complications from anticoagulation,  skin breakdown, inadequate sleep, recurring stroke, and constipation.         Overview/Hospital Course:  01/18 CP: Pt c/o pain/soreness after working with therapy. Also reports loose stools. Med orders placed. Pt is in good spirits and working hard with PT this am. Encouraged good therapy efforts. Unable to visualize surgical site today.  1/19 DL:  Patient doing well.  No acute events reported.  Labs and vital signs stable.  Patient doing well with therapy.  Encouraged to continue great therapy efforts.  1/21 [place] weekend crossover. No reported concerns with therapy efforts.   1/22 DL:  Patient doing well.  She is sitting in dining room and is awake, alert, oriented.  Vital signs stable.  Patient reports mild pain that is well controlled with current p.r.n. medications.  Surgical dressing to left knee dry and intact.  We have contacted patient's orthopedic surgeon who states patient has appointment 1/29.  Surgeon states he will remove dressing and staples at that appointment.  Patient continues to tolerate therapy well.  Encouraged to keep up great therapy efforts.    Interval History: patient seen and examined.     Review of Systems   Constitutional:  Positive for activity change and fatigue. Negative for appetite change, chills, diaphoresis, fever and unexpected weight change.   HENT:  Positive for hearing loss. Negative for congestion, dental problem, drooling, facial swelling, mouth sores, nosebleeds, sore throat and trouble swallowing.    Eyes:  Negative for visual disturbance.   Respiratory:  Negative for cough, shortness of breath and wheezing.    Cardiovascular:  Negative for chest pain, palpitations and leg swelling.   Gastrointestinal:  Negative for abdominal distention, abdominal pain, blood in stool, constipation, diarrhea, nausea and vomiting.   Endocrine: Negative for polyphagia.   Genitourinary:  Negative for difficulty urinating, dysuria, flank pain and frequency.   Musculoskeletal:  Positive for arthralgias, gait problem, joint swelling and myalgias. Negative for back  pain, neck pain and neck stiffness.   Skin:  Negative for rash.        Surgical incision site L knee   Allergic/Immunologic: Negative.    Neurological:  Positive for speech difficulty and weakness. Negative for dizziness, tremors, seizures, syncope, facial asymmetry, light-headedness, numbness and headaches.   Hematological:  Negative for adenopathy. Bruises/bleeds easily.   Psychiatric/Behavioral:  Negative for agitation, confusion and hallucinations. The patient is not nervous/anxious.      Objective:     Vital Signs (Most Recent):  Temp: 97.9 °F (36.6 °C) (01/22/24 0550)  Pulse: 85 (01/22/24 0550)  Resp: 18 (01/22/24 0831)  BP: (!) 107/58 (01/22/24 0550)  SpO2: 96 % (01/22/24 0550) Vital Signs (24h Range):  Temp:  [97.9 °F (36.6 °C)-98 °F (36.7 °C)] 97.9 °F (36.6 °C)  Pulse:  [85-92] 85  Resp:  [16-18] 18  SpO2:  [96 %-98 %] 96 %  BP: (107-132)/(58-93) 107/58     Weight: 94.4 kg (208 lb 1.8 oz)  Body mass index is 35.72 kg/m².    Intake/Output Summary (Last 24 hours) at 1/22/2024 1240  Last data filed at 1/22/2024 1234  Gross per 24 hour   Intake 1600 ml   Output --   Net 1600 ml           Physical Exam  Vitals and nursing note reviewed.   Constitutional:       General: She is awake. She is not in acute distress.     Appearance: Normal appearance. She is obese. She is not ill-appearing, toxic-appearing or diaphoretic.   HENT:      Head: Normocephalic and atraumatic.      Right Ear: External ear normal. Decreased hearing noted.      Left Ear: External ear normal. Decreased hearing noted.      Nose: Nose normal. No congestion or rhinorrhea.      Mouth/Throat:      Mouth: Mucous membranes are moist.      Pharynx: Oropharynx is clear. No oropharyngeal exudate or posterior oropharyngeal erythema.   Eyes:      General: No scleral icterus.        Right eye: No discharge.         Left eye: No discharge.      Extraocular Movements: Extraocular movements intact.      Conjunctiva/sclera: Conjunctivae normal.      Pupils:  Pupils are equal, round, and reactive to light.   Neck:      Thyroid: No thyroid mass or thyromegaly.      Vascular: No carotid bruit.      Meningeal: Brudzinski's sign and Kernig's sign absent.   Cardiovascular:      Rate and Rhythm: Normal rate and regular rhythm.      Chest Wall: PMI is not displaced. No thrill.      Pulses: Normal pulses.      Heart sounds: Normal heart sounds. No murmur heard.     No friction rub. No gallop.   Pulmonary:      Effort: Pulmonary effort is normal. No tachypnea, accessory muscle usage, prolonged expiration or respiratory distress.      Breath sounds: Normal breath sounds. No stridor or decreased air movement. No wheezing, rhonchi or rales.   Chest:      Chest wall: No tenderness.   Abdominal:      General: Bowel sounds are normal. There is no distension.      Palpations: Abdomen is soft. There is no hepatomegaly, splenomegaly or mass.      Tenderness: There is no abdominal tenderness. There is no right CVA tenderness, left CVA tenderness, guarding or rebound.      Hernia: No hernia is present.   Musculoskeletal:         General: Tenderness present. No swelling or deformity.      Cervical back: Neck supple. No rigidity. No muscular tenderness.      Right lower leg: No edema.      Left lower leg: Edema present.      Comments: Left knee with dressing, under intact tegaderm, no excessive warmth to touch   Lymphadenopathy:      Cervical: No cervical adenopathy.   Skin:     General: Skin is warm.      Capillary Refill: Capillary refill takes less than 2 seconds.      Coloration: Skin is not cyanotic, jaundiced or pale.      Findings: No erythema, petechiae or rash.   Neurological:      Mental Status: She is alert and oriented to person, place, and time. Mental status is at baseline.      Cranial Nerves: Cranial nerve deficit present. No dysarthria or facial asymmetry.      Motor: Weakness present. No tremor.      Gait: Gait abnormal.   Psychiatric:         Mood and Affect: Mood normal.  Mood is not anxious or depressed. Affect is not flat.         Speech: Speech is not rapid and pressured or slurred.         Behavior: Behavior normal. Behavior is not agitated, aggressive or combative.         Thought Content: Thought content normal. Thought content is not paranoid or delusional.         Cognition and Memory: Cognition is not impaired. Memory is not impaired.      Comments: + learning impairment, + deaf            Significant Labs: All pertinent labs within the past 24 hours have been reviewed.  Bilirubin:   Recent Labs   Lab 01/18/24  0606 01/21/24  0629   BILITOT 0.4 0.5       BMP:   Recent Labs   Lab 01/21/24  0629         K 4.0      CO2 28   BUN 12   CREATININE 0.8   CALCIUM 9.2       CBC:   Recent Labs   Lab 01/21/24 0629   WBC 8.71   HGB 11.2*   HCT 35.0*          CMP:   Recent Labs   Lab 01/21/24 0629      K 4.0      CO2 28      BUN 12   CREATININE 0.8   CALCIUM 9.2   PROT 6.8   ALBUMIN 3.0*   BILITOT 0.5   ALKPHOS 70   AST 32   ALT 43   ANIONGAP 4          Significant Imaging: I have reviewed all pertinent imaging results/findings within the past 24 hours.    Assessment/Plan:      * Status post total left knee replacement  Patient's pain seems to be fairly well controlled postoperatively agree with current regimen and adjust as needed.  PT/OT evaluations reviewed.  1/21 Left knee pain on scale of 6 out of 10, after pain meds, pain reduced to 2 out of 10.   1/22:  Patient reports 1/10 left knee pain that is well-controlled with p.r.n. medications.  Surgical dressing intact.  No drainage noted.  Patient has appointment with orthopedic surgeon on the 29th of this month.  Surgeon states he will remove dressing and staples at that time.      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      Essential hypertension  Chronic, controlled. Latest blood pressure and vitals reviewed-   BP Readings from Last 3 Encounters:   01/22/24 (!) 107/58   01/16/24 115/75   12/18/23 (!) 146/91      Temp:  [97.9 °F (36.6 °C)-98 °F (36.7 °C)]   Pulse:  [85-92]   Resp:  [16-18]   BP: (107-132)/(58-93)   SpO2:  [96 %-98 %] .   Home meds for hypertension were reviewed and noted below.     While in the hospital, will manage blood pressure as follows; Continue home antihypertensive regimen    Will utilize p.r.n. blood pressure medication only if patient's blood pressure greater than 140/90 and she develops symptoms such as worsening chest pain or shortness of breath.    Bilateral deafness  Patient able to communicate with tablet and has some basic reading and writing skills.      Obesity (BMI 30-39.9)  Body mass index is 35.72 kg/m². Morbid obesity complicates all aspects of disease management from diagnostic modalities to treatment. Weight loss encouraged and health benefits explained to patient.         Primary osteoarthritis of left knee  Patient's pain seems to be fairly well controlled postoperatively agree with current regimen and adjust as needed.        VTE Risk Mitigation (From admission, onward)           Ordered     rivaroxaban tablet 20 mg  with dinner         01/16/24 1435     IP VTE LOW RISK PATIENT  Once         01/16/24 1435     Place sequential compression device  Until discontinued         01/16/24 1435                    Discharge Planning   FRED:      Code Status: Full Code   Is the patient medically ready for discharge?:     Reason for patient still in hospital (select all that apply): Patient trending condition, Treatment, and PT / OT recommendations  Discharge Plan A: Home with family                  Leslie Guidry NP  Department of Hospital Medicine   [facility] ([facility])

## 2024-01-22 NOTE — PT/OT/SLP PROGRESS
Physical Therapy Inpatient Rehab Treatment    Patient Name:  Lupe Santizo   MRN:  [id number]    Recommendations:     Discharge Recommendations:  Low Intensity Therapy   Discharge Equipment Recommendations: walker, rolling, bedside commode, bath bench   Barriers to discharge: Inaccessible home    Assessment:     Lupe Santizo is a 43 y.o. female admitted with a medical diagnosis of Status post total left knee replacement.  She presents with the following impairments/functional limitations: weakness, pain, cognitive deficits, impaired coordination, impaired balance, gait deviations, difficulty with functional transfers, difficulty with bed mobility, difficulty with wheelchair propulsion, impaired sensation, impaired proprioception, decreased motor control, decreased safety, and decreased endurance. These limitations are causing decreased function and independence. The patient would continue to benefit from skilled PT services in inpatient rehab to improve her function and independence. She currently cannot access her home because she cannot yet negotiate stairs.     Patient tolerated treatment well today with frequent complaints of fatigue. She is making improvements with gait quality each treatment session. Displaying fear avoidance behaviors that are limiting her ability to negotiate steps and fatigue limits her ability to ambulate distances >30ft.     Rehab Diagnosis: L TKA    Recent Surgery: L TKA    General Precautions: Standard, deaf, fall     Orthopedic Precautions:LLE weight bearing as tolerated     Braces: N/A    Rehab Prognosis: Fair; patient would benefit from acute skilled PT services to address these deficits and reach maximum level of function.      History:     Past Medical History:   Diagnosis Date    Anticoagulant long-term use     Deaf     History of DVT (deep vein thrombosis)     Primary osteoarthritis of left knee        Past Surgical History:   Procedure Laterality Date    CHOLECYSTECTOMY       KNEE SURGERY      TOTAL KNEE ARTHROPLASTY Left 1/11/2024    Procedure: ARTHROPLASTY, KNEE, TOTAL;  Surgeon: Phoenix Godoy MD;  Location: [facility];  Service: Orthopedics;  Laterality: Left;       Subjective     Chief Complaint: knee pain    Respiratory Status: Room air    Patients cultural, spiritual, Episcopal conflicts given the current situation: no      Objective:     Communicated with patient and nurse prior to session.  Patient found up in chair with nursing upon PT entry to room.    Pt is Oriented x3 and Alert.    Vitals   Vitals at Rest  /65 mm  hg   HR 84 bpm   O2 Sat 97%   Pain 3/10 L knee pain w/ ambulation         Functional Mobility:   Transfers:     Sit to Stand: Supervision or touching assistance  with rolling walker  Toilet Transfer: Set-up or clean-up assistance  with  WC and grab bars  using  Stand Pivot  Gait: Pt ambulates 15 ft three times and 30 ft twice with RW with Supervision or touching assistance .   One 2 inch curb with rolling walker with Partial/moderate assistance   Ambulate up and down 10 feet on uneven surfaces/ramps with rolling walker with Partial/moderate assistance   Wheelchair Propulsion:  Pt propelled Standard wheelchair x 300+ feet on Level tile with  Right upper extremity and Left upper extremity with Independent.      Current   Status  Discharge   Goal   Functional Area: Care Score:    Roll Left and Right 4 Set-up/clean-up   Sit to Lying 4 Set-up/clean-up   Lying to Sitting on Side of Bed 4 Set-up/clean-up   Sit to Stand 4 Set-up/clean-up   Chair/Bed-to-Chair Transfer 4 Set-up/clean-up   Car Transfer 10 Supervision or touching assistance   Walk 10 Feet 4 Supervision or touching assistance   Walk 50 Feet with Two Turns 88 Supervision or touching assistance   Walk 150 Feet 88 Supervision or touching assistance   Walk 10 Feet Uneven Surface 3 Supervision or touching assistance   1 Step (Curb) 3 Supervision or touching assistance   4 Steps 88 Supervision or touching  assistance   12 Steps 9 Not applicable   Picking Up Object 4 Set-up/clean-up   Wheel 50 Feet with Two Turns 6 Independent   Wheel 150 Feet 6 Independent       Therapeutic Activities and Exercises:  Gait training  Wheelchair management training  Transfer training  Toilet transfers and toileting   Curb negotiation and ramp training  with RW    Activity Tolerance: Good    Patient left up in chair with  nursing present.    Education provided: roles and goals of PT/PTA, transfer training, gait training, safety awareness, body mechanics, assistive device, and wheelchair management    Expected compliance: Moderate compliance    GOALS:   Multidisciplinary Problems       Physical Therapy Goals          Problem: Physical Therapy    Goal Priority Disciplines Outcome Goal Variances Interventions   Physical Therapy Goal     PT, PT/OT Ongoing, Progressing     Description:   Short Term Goals: 1/24/2024   Long Term Goals: 1/31/2024     Transfers Status    Sit to Stand  Short Term Goal: Complete sit to stand with Stand-by Assistance using Rolling Walker with minimal  verbal cues (MET 1/22/2024)  Long Term Goal:  Complete sit to stand with Supervision or Set-up Assistance using Rolling Walker (MET 1/22/2024)    Stand Step  Short Term Goal: Complete stand step with  Stand-by Assistance  using Rolling Walker with minimal   verbal cues (MET 1/22/2024)  Long Term Goal:  Complete stand step with  Supervision or Set-up Assistance  using Rolling Walker (MET 1/22/2024)    Supine <> Sit    Long Term Goal: Complete supine <> sit with Supervision or Set-up Assistance  (MET 1/22/2024)    Rolling Right/Left  Long Term Goal: Complete rolling  right/left with Supervision or Set-up Assistance (MET 1/22/2024)    Balance/Coordination    Static Sitting  Long Term Goal: Complete static sitting with Modified Independent (MET 1/22/2024)    Dynamic Sitting  Long Term Goal: Complete dynamic sitting with Supervision or Set-up Assistance  with  minimal   verbal cues minimal excursions (MET 2024)    Static Standing  Short Term Goal: Complete static standing with Stand-by Assistance with  minimal  verbal cues and RW (MET 2024)  Long Term Goal: Complete static standing with Supervision or Set-up Assistance  with   minimal   verbal cues and RW (MET 2024)    Dynamic Standing  Short Term Goal: Complete dynamic standing with Stand-by Assistance  with minimal   verbal cues minimal  excursions with RW (MET 2024)  Long Term Goal: Complete dynamic standing with Supervision or Set-up Assistance with minimal   verbal cues and minimal  excursions with RW (MET 2024)    Endurance    Short Term Goal:   Physical Therapy: 2 times a day, 30-45 minutes (MET 2024)    Rest periods: multiple (MET 2024)    Sitting: 3 hours/day (MET 2024)    Long Term Goal:  Physical Therapy: 2 times a day, 45 minutes (in progress)    Rest periods: minimal (in progress)    Sittin-8 hours/day (in progress)      Mobility/Gait  Short Term Goal: Will ambulate with Stand-by Assistance  Using Rolling Walker with minimal   verbal cues x 100 ft (in progress)  Long Term Goal: Will ambulate with Stand-by Assistance  using Rolling Walker with minimal  verbal cues x 150 ft (in progress)     Stairs Assistance  Long Term Goal: Patient will ascend/descend 4 stairs/steps using both  handrails  nonreciprocal steps with Minimal Assistance and minimal  verbal cues (in progress)    Wheelchair Skills/Surface  Long Term Goal: Patient will propel Standard wheelchair x 300 feet with Level tileWITH Bilateral upper extremity with Modified Independent (MET 2024)      Ramp/Uneven 10 feet surface  Long  Term Goal; Patient will be able to navigate a 10 inch uneven surface with proper A.D. with  contact guard assistance/stand by assistance (in progress)      2-6  inch curb  Long  Term Goal; Patient will be able to navigate a  2  inch curb with RW with  minimal assistance (in  progress)    Picking up object   Long  Term Goal; Patient will be able to  a small object from the floor  with a RW . with  stand by assistance (in progress)    Car transfers  Long  Term Goal; Patient will be able to get in and out of a vehicle  with RW  with  minimal assistance (in progress)    Education  Long Term Goals:  Patient/family/caregivers trained on physical mobility and precautions with Supervision. (In progress)    Patient/family/caregivers trained on safety awareness with Supervision. (In progress)    Order and obtain all appropriate equipment. (In progress)                          Plan:     During this hospitalization, patient to be seen 5 x/week to address the identified rehab impairments via gait training, therapeutic activities, therapeutic exercises, neuromuscular re-education, wheelchair management/training and progress toward the following goals:    Plan of Care Expires:  01/31/24  PT Next Visit Date: 01/23/24  Plan of Care reviewed with: patient    Additional Information:     Utilizing concurrent therapy to address goals related to safe bed mobility, transfers, and  gait activities.     Time Tracking:     Therapy Time  PT Received On: 01/22/24  PT Start Time: 1045  PT Stop Time: 1200  PT Total Time (min): 75 min   PT Concurrent: 75    Billable Minutes: Gait Training 35, Therapeutic Activity 15, and Train/Wheelchair Management 25 01/22/2024

## 2024-01-22 NOTE — PT/OT/SLP PROGRESS
Occupational Therapy Inpatient Rehab Treatment    Name: Lupe Santizo  MRN: [id number]    Assessment:  Lupe Santizo is a 43 y.o. female admitted with a medical diagnosis of Status post total left knee replacement.  She presents with the following impairments/functional limitations:  weakness, impaired endurance, impaired self care skills, impaired functional mobility, gait instability, impaired balance, visual deficits, impaired cognition, decreased coordination, decreased upper extremity function, decreased lower extremity function, decreased safety awareness, pain, decreased ROM, impaired fine motor, impaired skin, edema, impaired cardiopulmonary response to activity, impaired joint extensibility, impaired muscle length.    Pt demonstrated progress with functional short term goals as noted below by changes in functional scores in which patient able to achieve 5/7 STG's while progressing towards long term goals. Pt now moderate assistance to setup assistance with ADL's including functional mobility with extra time, and use of assistive devices and adaptive equipment.    General Precautions: Standard, deaf, fall     Orthopedic Precautions:LLE weight bearing as tolerated     Braces: N/A    Rehab Prognosis: Good; patient would benefit from acute skilled OT services to address these deficits and reach maximum level of function.      History:     Past Medical History:   Diagnosis Date    Anticoagulant long-term use     Deaf     History of DVT (deep vein thrombosis)     Primary osteoarthritis of left knee        Past Surgical History:   Procedure Laterality Date    CHOLECYSTECTOMY      KNEE SURGERY      TOTAL KNEE ARTHROPLASTY Left 1/11/2024    Procedure: ARTHROPLASTY, KNEE, TOTAL;  Surgeon: Phoenix Godoy MD;  Location: [facility];  Service: Orthopedics;  Laterality: Left;       Subjective     Orientation: Oriented x4    Chief Complaint: weakness and pain     Patient/Family Comments/goals: Pt would like to improve  her overall independence with ADL's including functional mobility.       Respiratory Status: Room air    Patients cultural, spiritual, Taoism conflicts given the current situation: no       Objective:     Patient found up in chair with    upon OT entry to room.    Mobility   Patient completed:  Sit to Stand Transfer with contact guard assistance with rolling walker  Bed to Chair Transfer using Step Transfer technique with contact guard assistance with rolling walker  Toilet Transfer Step Transfer technique with contact guard assistance with  grab bars  Shower Transfer Step Transfer technique with contact guard assistance with grab bars and TTB    ADLs   Current Status   Eating 5   Oral Hygiene 5   Shower, Bathe Self 3   Upper Body Dressing 4   Lower Body Dressing 3   Toileting Hygiene 3   Toilet Transfer 4   Putting On, Taking Off Footwear 3     Limiting Factors for ADLs: endurance, limited ROM, balance, weakness, body habitus, coordination, cognition, safety awareness, and pain     Therapeutic Exercise  Pt participated in therapeutic exercise performing 3x15 bilateral UE proximal strengthening exercises utilizing heavy resistance theraband with scapular retraction, shoulder extension, shoulder adduction, horizontal abduction, shoulder flexion in plane of scaption, internal rotation and external rotation requiring verbal and tactile cueing for technique while emphasizing quality of movement.       Additional Treatments: Pt participated in extensive ADL retraining as further noted above emphasizing use of adaptive equipment, assistive devices, and compensatory strategies providing extra time with demonstration requiring moderate tactile cueing for safety awareness and technique with additional written instruction.     LifeStyle Change and Education:             Patient left up in chair with  nurse notified and ST present.     Education provided: Roles and goals of OT, ADLs, transfer training, bed mobility, body  mechanics, assistive device, wheelchair precautions, safety precautions, fall prevention, post-op precautions, equipment recommendations, and home safety    Short Term Goals to be met by: 01/23/24      Patient will increase functional independence with ADLs by performing:     UE Dressing with Supervision. MET  LE Dressing with Moderate Assistance. MET  Grooming while seated at sink with Set-up Assistance. MET  Toileting from toilet with Minimal Assistance for hygiene and clothing management. ONGOING - Moderate Assistance  Bathing from  shower chair/bench with Minimal Assistance. ONGOING - Moderate Assistance  Toilet transfer to toilet with Minimal Assistance. MET  Increased functional strength to 4/5 for bilateral UE's. MET      Long Term Goals to be met by: 01/30/24      Patient will increase functional independence with ADLs by performing:     Feeding with Duncan. ONGOING  UE Dressing with Modified [place]. ONGOING  LE Dressing with Supervision. ONGOING  Grooming while seated at sink with Modified [place]. ONGOING  Toileting from toilet with Set-up Assistance for hygiene and clothing management. ONGOING  Bathing from  shower chair/bench with Set-up Assistance. ONGOING  Toilet transfer to toilet with Set-up Assistance. ONGOING  Increased functional strength to 4+/5 to 5/5 for bilateral UE's. ONGOING      Time Tracking     OT Received On: 01/22/24  Time In 0915     Time Out 1045  Total Time 90 min  Therapy Time: OT Individual: 90  Missed Time:    Missed Time Reason:      Billable Minutes: Self Care/Home Management 60 and Therapeutic Exercise 30    01/22/2024

## 2024-01-22 NOTE — NURSING
Patient had surgery on her left knee on 1/11/24. Umesh reached out to him to see when the dressing can be removed and if so would there be any wound care. Dr. Phoenix Godoy told Umesh just to keep dressing on the patient and patient  has an appointment on the 29th of January and he would remove dressing and staples.

## 2024-01-22 NOTE — ASSESSMENT & PLAN NOTE
Patient's pain seems to be fairly well controlled postoperatively agree with current regimen and adjust as needed.  PT/OT evaluations reviewed.  1/21 Left knee pain on scale of 6 out of 10, after pain meds, pain reduced to 2 out of 10.   1/22:  Patient reports 1/10 left knee pain that is well-controlled with p.r.n. medications.  Surgical dressing intact.  No drainage noted.  Patient has appointment with orthopedic surgeon on the 29th of this month.  Surgeon states he will remove dressing and staples at that time.

## 2024-01-22 NOTE — PT/OT/SLP PROGRESS
"Physical Therapy Inpatient Rehab Treatment    Patient Name:  Lupe Santizo   MRN:  [id number]    Recommendations:     Discharge Recommendations:  Low Intensity Therapy   Discharge Equipment Recommendations: walker, rolling, bedside commode, bath bench   Barriers to discharge: None    Assessment:     Lupe Santizo is a 43 y.o. female admitted with a medical diagnosis of Status post total left knee replacement.  She presents with the following impairments/functional limitations:     weakness, pain, cognitive deficits, impaired coordination, impaired balance, gait deviations, difficulty with functional transfers, difficulty with bed mobility, difficulty with wheelchair propulsion, impaired sensation, impaired proprioception, decreased motor control, decreased safety, and decreased endurance.Patient is deaf, she communicates via writing and communication picture board.    Attempted to do stair training, able to go up 1 step but refused to continue further stair training due to pain and feelign of being "scared".  Patient was able to achieve  -25 of left knee passive extension (measured in supine) and .90 degrees (passive flexion) and 85 degrees of active knee flexion measured in sitting.  Poor tolerance to stretching due to pain.      .    Rehab Diagnosis:   Left TKA, WBAT   and CVA with residual deficits        Recent Surgery: * No surgery found *      General Precautions: Standard, deaf, fall     Orthopedic Precautions:LLE weight bearing as tolerated     Braces: N/A    Rehab Prognosis: Fair; patient would benefit from acute skilled PT services to address these deficits and reach maximum level of function.      History:     Past Medical History:   Diagnosis Date    Anticoagulant long-term use     Deaf     History of DVT (deep vein thrombosis)     Primary osteoarthritis of left knee        Past Surgical History:   Procedure Laterality Date    CHOLECYSTECTOMY      KNEE SURGERY      TOTAL KNEE ARTHROPLASTY Left " "1/11/2024    Procedure: ARTHROPLASTY, KNEE, TOTAL;  Surgeon: Phoenix Godoy MD;  [facility];  Service: Orthopedics;  Laterality: Left;       Subjective     Chief Complaint: Gestures "pain"     Respiratory Status: Room air    Patients cultural, spiritual, Gnosticism conflicts given the current situation: no      Objective:     Communicated with nurse and patient  prior to session.  Patient found up in chair with    upon PT entry to room.    Pt is Oriented x3 and Alert and Cooperative.      Pain Left knee, did not quantify        Functional Mobility:   Bed Mobility:     Rolling Left:  Supervision or touching assistance   Rolling Right: Supervision or touching assistance   Scooting: Supervision or touching assistance   Bridging: Supervision or touching assistance   Supine to Sit: Supervision or touching assistance   Sit to Supine: Supervision or touching assistance   Transfers:     Sit to Stand: Supervision or touching assistance  with rolling walker  Chair to mat: Supervision or touching assistance  with  rolling walker  using  Step Transfer  1 step (6 inch)  stairs with bilateral handrails with Substantial/maximal assistance    object from ground in standing position with reacher with rolling walker with Substantial/maximal assistance      Current   Status  Discharge   Goal   Functional Area: Care Score:    Roll Left and Right 4 Set-up/clean-up   Sit to Lying 4 Set-up/clean-up   Lying to Sitting on Side of Bed 4 Set-up/clean-up   Sit to Stand 4 Set-up/clean-up   Chair/Bed-to-Chair Transfer 4 Set-up/clean-up   Car Transfer 10 Supervision or touching assistance   Walk 10 Feet 4 Supervision or touching assistance   Walk 50 Feet with Two Turns 88 Supervision or touching assistance   Walk 150 Feet 88 Supervision or touching assistance   Walk 10 Feet Uneven Surface 3 Supervision or touching assistance   1 Step (Curb) 3 Supervision or touching assistance   4 Steps 88 Supervision or touching assistance   12 " Steps 9 Not applicable   Picking Up Object 4 Set-up/clean-up   Wheel 50 Feet with Two Turns 6 Independent   Wheel 150 Feet 6 Independent       Therapeutic Activities and Exercises:  Sit <> stand with both UE support  Stand step transfers with a RW  Stair training   Sit <> supine   Rolling side <> side  Left knee ROM and stretching at end range with mobilization   Active assisted SLR and hip abduction on the left LE  x 10 reps x 2       Activity Tolerance: Fair    Patient left up in chair with call button in reach and nurse  notified.    Education provided: roles and goals of PT/PTA, transfer training, bed mob, stair training, balance training, safety awareness, assistive device, and strengthening exercises    Expected compliance: Moderate compliance    GOALS:   Multidisciplinary Problems       Physical Therapy Goals          Problem: Physical Therapy    Goal Priority Disciplines Outcome Goal Variances Interventions   Physical Therapy Goal     PT, PT/OT Ongoing, Progressing     Description:   Short Term Goals: 1/24/2024   Long Term Goals: 1/31/2024     Transfers Status    Sit to Stand  Short Term Goal: Complete sit to stand with Stand-by Assistance using Rolling Walker with minimal  verbal cues (MET 1/22/2024)  Long Term Goal:  Complete sit to stand with Supervision or Set-up Assistance using Rolling Walker (MET 1/22/2024)    Stand Step  Short Term Goal: Complete stand step with  Stand-by Assistance  using Rolling Walker with minimal   verbal cues (MET 1/22/2024)  Long Term Goal:  Complete stand step with  Supervision or Set-up Assistance  using Rolling Walker (MET 1/22/2024)    Supine <> Sit    Long Term Goal: Complete supine <> sit with Supervision or Set-up Assistance  (MET 1/22/2024)    Rolling Right/Left  Long Term Goal: Complete rolling  right/left with Supervision or Set-up Assistance (MET 1/22/2024)    Balance/Coordination    Static Sitting  Long Term Goal: Complete static sitting with Modified Independent  (MET 2024)    Dynamic Sitting  Long Term Goal: Complete dynamic sitting with Supervision or Set-up Assistance  with  minimal  verbal cues minimal excursions (MET 2024)    Static Standing  Short Term Goal: Complete static standing with Stand-by Assistance with  minimal  verbal cues and RW (MET 2024)  Long Term Goal: Complete static standing with Supervision or Set-up Assistance  with   minimal   verbal cues and RW (MET 2024)    Dynamic Standing  Short Term Goal: Complete dynamic standing with Stand-by Assistance  with minimal   verbal cues minimal  excursions with RW (MET 2024)  Long Term Goal: Complete dynamic standing with Supervision or Set-up Assistance with minimal   verbal cues and minimal  excursions with RW (MET 2024)    Endurance    Short Term Goal:   Physical Therapy: 2 times a day, 30-45 minutes (MET 2024)    Rest periods: multiple (MET 2024)    Sitting: 3 hours/day (MET 2024)    Long Term Goal:  Physical Therapy: 2 times a day, 45 minutes (in progress)    Rest periods: minimal (in progress)    Sittin-8 hours/day (in progress)      Mobility/Gait  Short Term Goal: Will ambulate with Stand-by Assistance  Using Rolling Walker with minimal   verbal cues x 100 ft (in progress)  Long Term Goal: Will ambulate with Stand-by Assistance  using Rolling Walker with minimal  verbal cues x 150 ft (in progress)     Stairs Assistance  Long Term Goal: Patient will ascend/descend 4 stairs/steps using both  handrails  nonreciprocal steps with Minimal Assistance and minimal  verbal cues (in progress)    Wheelchair Skills/Surface  Long Term Goal: Patient will propel Standard wheelchair x 300 feet with Level tileWITH Bilateral upper extremity with Modified Independent (MET 2024)      Ramp/Uneven 10 feet surface  Long  Term Goal; Patient will be able to navigate a 10 inch uneven surface with proper A.D. with  contact guard assistance/stand by assistance (in progress)      2-6   inch curb  Long  Term Goal; Patient will be able to navigate a  2  inch curb with RW with  minimal assistance (in progress)    Picking up object   Long  Term Goal; Patient will be able to  a small object from the floor  with a RW . with  stand by assistance (in progress)    Car transfers  Long  Term Goal; Patient will be able to get in and out of a vehicle  with RW  with  minimal assistance (in progress)    Education  Long Term Goals:  Patient/family/caregivers trained on physical mobility and precautions with Supervision. (In progress)    Patient/family/caregivers trained on safety awareness with Supervision. (In progress)    Order and obtain all appropriate equipment. (In progress)                          Plan:     During this hospitalization, patient to be seen 5 x/week to address the identified rehab impairments via gait training, therapeutic activities, therapeutic exercises, neuromuscular re-education, wheelchair management/training and progress toward the following goals:    Plan of Care Expires:  01/31/24  PT Next Visit Date: 01/23/24  Plan of Care reviewed with: patient    Additional Information:         Time Tracking:     Therapy Time  PT Received On: 01/22/24  PT Start Time: 1300  PT Stop Time: 1345  PT Total Time (min): 45 min   PT Individual: 45    Billable Minutes: Therapeutic Activity 30 and Therapeutic Exercise 15    01/22/2024

## 2024-01-22 NOTE — PLAN OF CARE
Problem: Physical Therapy  Goal: Physical Therapy Goal  Description:   Short Term Goals: 1/24/2024   Long Term Goals: 1/31/2024     Transfers Status    Sit to Stand  Short Term Goal: Complete sit to stand with Stand-by Assistance using Rolling Walker with minimal  verbal cues (MET 1/22/2024)  Long Term Goal:  Complete sit to stand with Supervision or Set-up Assistance using Rolling Walker (MET 1/22/2024)    Stand Step  Short Term Goal: Complete stand step with  Stand-by Assistance  using Rolling Walker with minimal   verbal cues (MET 1/22/2024)  Long Term Goal:  Complete stand step with  Supervision or Set-up Assistance  using Rolling Walker (MET 1/22/2024)    Supine <> Sit    Long Term Goal: Complete supine <> sit with Supervision or Set-up Assistance  (MET 1/22/2024)    Rolling Right/Left  Long Term Goal: Complete rolling  right/left with Supervision or Set-up Assistance (MET 1/22/2024)    Balance/Coordination    Static Sitting  Long Term Goal: Complete static sitting with Modified Independent (MET 1/22/2024)    Dynamic Sitting  Long Term Goal: Complete dynamic sitting with Supervision or Set-up Assistance  with  minimal  verbal cues minimal excursions (MET 1/22/2024)    Static Standing  Short Term Goal: Complete static standing with Stand-by Assistance with  minimal  verbal cues and RW (MET 1/22/2024)  Long Term Goal: Complete static standing with Supervision or Set-up Assistance  with   minimal   verbal cues and RW (MET 1/22/2024)    Dynamic Standing  Short Term Goal: Complete dynamic standing with Stand-by Assistance  with minimal   verbal cues minimal  excursions with RW (MET 1/22/2024)  Long Term Goal: Complete dynamic standing with Supervision or Set-up Assistance with minimal   verbal cues and minimal  excursions with RW (MET 1/22/2024)    Endurance    Short Term Goal:   Physical Therapy: 2 times a day, 30-45 minutes (MET 1/22/2024)    Rest periods: multiple (MET 1/22/2024)    Sitting: 3 hours/day (MET  2024)    Long Term Goal:  Physical Therapy: 2 times a day, 45 minutes (in progress)    Rest periods: minimal (in progress)    Sittin-8 hours/day (in progress)      Mobility/Gait  Short Term Goal: Will ambulate with Stand-by Assistance  Using Rolling Walker with minimal   verbal cues x 100 ft (in progress)  Long Term Goal: Will ambulate with Stand-by Assistance  using Rolling Walker with minimal  verbal cues x 150 ft (in progress)     Stairs Assistance  Long Term Goal: Patient will ascend/descend 4 stairs/steps using both  handrails  nonreciprocal steps with Minimal Assistance and minimal  verbal cues (in progress)    Wheelchair Skills/Surface  Long Term Goal: Patient will propel Standard wheelchair x 300 feet with Level tileWITH Bilateral upper extremity with Modified Independent (MET 2024)      Ramp/Uneven 10 feet surface  Long  Term Goal; Patient will be able to navigate a 10 inch uneven surface with proper A.D. with  contact guard assistance/stand by assistance (in progress)      2-6  inch curb  Long  Term Goal; Patient will be able to navigate a  2  inch curb with RW with  minimal assistance (in progress)    Picking up object   Long  Term Goal; Patient will be able to  a small object from the floor  with a RW . with  stand by assistance (in progress)    Car transfers  Long  Term Goal; Patient will be able to get in and out of a vehicle  with RW  with  minimal assistance (in progress)    Education  Long Term Goals:  Patient/family/caregivers trained on physical mobility and precautions with Supervision. (In progress)    Patient/family/caregivers trained on safety awareness with Supervision. (In progress)    Order and obtain all appropriate equipment. (In progress)     Outcome: Ongoing, Progressing

## 2024-01-23 PROCEDURE — 25000003 PHARM REV CODE 250

## 2024-01-23 PROCEDURE — 25000003 PHARM REV CODE 250: Performed by: STUDENT IN AN ORGANIZED HEALTH CARE EDUCATION/TRAINING PROGRAM

## 2024-01-23 PROCEDURE — 97530 THERAPEUTIC ACTIVITIES: CPT

## 2024-01-23 PROCEDURE — 97542 WHEELCHAIR MNGMENT TRAINING: CPT

## 2024-01-23 PROCEDURE — 11800000 HC REHAB PRIVATE ROOM

## 2024-01-23 PROCEDURE — 97116 GAIT TRAINING THERAPY: CPT

## 2024-01-23 PROCEDURE — 97110 THERAPEUTIC EXERCISES: CPT

## 2024-01-23 PROCEDURE — 97535 SELF CARE MNGMENT TRAINING: CPT

## 2024-01-23 PROCEDURE — 25000003 PHARM REV CODE 250: Performed by: INTERNAL MEDICINE

## 2024-01-23 RX ADMIN — RIVAROXABAN 20 MG: 10 TABLET, FILM COATED ORAL at 04:01

## 2024-01-23 RX ADMIN — SENNOSIDES AND DOCUSATE SODIUM 1 TABLET: 8.6; 5 TABLET ORAL at 08:01

## 2024-01-23 RX ADMIN — GABAPENTIN 300 MG: 300 CAPSULE ORAL at 03:01

## 2024-01-23 RX ADMIN — OXYCODONE HYDROCHLORIDE 10 MG: 10 TABLET ORAL at 03:01

## 2024-01-23 RX ADMIN — GABAPENTIN 300 MG: 300 CAPSULE ORAL at 08:01

## 2024-01-23 RX ADMIN — FAMOTIDINE 20 MG: 20 TABLET, FILM COATED ORAL at 08:01

## 2024-01-23 RX ADMIN — OXYCODONE HYDROCHLORIDE 10 MG: 10 TABLET ORAL at 08:01

## 2024-01-23 RX ADMIN — METHOCARBAMOL TABLETS 750 MG: 750 TABLET, COATED ORAL at 08:01

## 2024-01-23 RX ADMIN — POLYETHYLENE GLYCOL (3350) 17 G: 17 POWDER, FOR SOLUTION ORAL at 08:01

## 2024-01-23 RX ADMIN — ZIPRASIDONE HYDROCHLORIDE 40 MG: 40 CAPSULE ORAL at 08:01

## 2024-01-23 NOTE — PLAN OF CARE
Recommendations  1. Rec'd regular diet.   2. RD to follow and make rec's accordingly.  Goals:   1. Pt will continue to consume >75% of meals by next RD follow up.  Nutrition Goal Status: goal met

## 2024-01-23 NOTE — PROGRESS NOTES
"[facility])  Adult Nutrition  Progress Note    SUMMARY       Recommendations  1. Rec'd regular diet.   2. RD to follow and make rec's accordingly.  Goals:   1. Pt will continue to consume >75% of meals by next RD follow up.  Nutrition Goal Status: goal met  Communication of RD Recs: discussed on rounds    Assessment and Plan    Nutrition Problem  Obese, Class II     Related to (etiology):   Predicted excessive energy intake     Signs and Symptoms (as evidenced by):   BMI = 35.72      Interventions/Recommendations (treatment strategy):  1. Rec'd regular diet.   2. RD to follow and make rec's accordingly.     Nutrition Diagnosis Status:   Continues     Reason for Assessment    Reason For Assessment: RD follow-up  Diagnosis: other (see comments) (Status post total knee replacement)  Relevant Medical History: Deaf, History of DVT, Primary osteoarthritis of left knee  Interdisciplinary Rounds: attended  General Information Comments: Followed up on pt this morning. Pt appetite is still great and she is eating 100% of meals. RD to follow and make rec's accordingly.  Nutrition Discharge Planning: TBD as care progresses    Nutrition Risk Screen    Nutrition Risk Screen: no indicators present    Nutrition/Diet History    Patient Reported Diet/Restrictions/Preferences: general  Food Preferences: Likes: Salad  Spiritual, Cultural Beliefs, Congregation Practices, Values that Affect Care: no  Food Allergies: NKFA    Anthropometrics    Temp: 97.9 °F (36.6 °C)  Height Method: Stated  Height: 5' 4" (162.6 cm)  Height (inches): 64 in  Weight Method: Standard Scale  Weight: 100.4 kg (221 lb 6.4 oz)  Weight (lb): 221.4 lb  Ideal Body Weight (IBW), Female: 120 lb  % Ideal Body Weight, Female (lb): 173.43 %  BMI (Calculated): 38  BMI Grade: 35 - 39.9 - obesity - grade II    Lab/Procedures/Meds    Pertinent Labs Reviewed: reviewed  Pertinent Medications Reviewed: reviewed    Estimated/Assessed Needs    Weight Used For Calorie " Calculations: 64.4 kg (142 lb) (AdjBW)  Energy Calorie Requirements (kcal): 3892-7825 (20-25kcal/kg)  Energy Need Method: Kcal/kg  Protein Requirements: 43-54 (0.8-1.0g/kg IBW)  Weight Used For Protein Calculations: 54.4 kg (119 lb 15.9 oz)  Fluid Requirements (mL): 1707-4125 (1mL/kcal)  Estimated Fluid Requirement Method: RDA Method  RDA Method (mL): 1288    Nutrition Prescription Ordered    Current Diet Order: Regular  Oral Nutrition Supplement: None    Evaluation of Received Nutrient/Fluid Intake    % Kcal Needs: 100%  % Protein Needs: 100%  I/O: +840  Energy Calories Required: meeting needs  Protein Required: meeting needs  Fluid Required: meeting needs  Tolerance: tolerating  % Intake of Estimated Energy Needs: 75 - 100 %  % Meal Intake: 75 - 100 %    Nutrition Risk    Level of Risk/Frequency of Follow-up: low     Monitor and Evaluation    Food and Nutrient Intake: energy intake, food and beverage intake  Food and Nutrient Adminstration: diet order  Knowledge/Beliefs/Attitudes: food and nutrition knowledge/skill, beliefs and attitudes  Physical Activity and Function: nutrition-related ADLs and IADLs  Anthropometric Measurements: height/length, weight, weight change, body mass index  Biochemical Data, Medical Tests and Procedures: electrolyte and renal panel, gastrointestinal profile, glucose/endocrine profile, inflammatory profile, lipid profile  Nutrition-Focused Physical Findings: overall appearance     Nutrition Follow-Up    RD Follow-up?: Yes

## 2024-01-23 NOTE — PLAN OF CARE
Contacted Dr. Godoy office to check time for patient's ortho appt. Spoke to Laxmi who states someone had called and requested to cancel appt. Informed Laxmi that per Dr. Godoy, he wanted patient to keep appt on 1/29/2024 as he was removing dressing and marcela. Laxmi states she will speak to nursing and have them call patient's mother to schedule appt.

## 2024-01-23 NOTE — PLAN OF CARE
Ms. Andrade rested well this shift, no needs or complaints voice, communicated to call for needs or concerns, communicated understanding via communication board.

## 2024-01-23 NOTE — PT/OT/SLP PROGRESS
Occupational Therapy Inpatient Rehab Treatment    Name: Lupe Santizo  MRN: [id number]    Assessment:  Lupe Santizo is a 43 y.o. female admitted with a medical diagnosis of Status post total left knee replacement.  She presents with the following impairments/functional limitations:  weakness, impaired endurance, impaired self care skills, impaired functional mobility, gait instability, impaired balance, visual deficits, impaired cognition, decreased coordination, decreased upper extremity function, decreased lower extremity function, decreased safety awareness, pain, decreased ROM, impaired fine motor, impaired skin, edema, impaired cardiopulmonary response to activity, impaired joint extensibility, impaired muscle length.    Pt demonstrated improved functional performance with LB dressing as noted by mod assist in which patient able to thread (R) LE into pants, pull / adjust pants to waist, and jared bilateral socks with use of sock aide requiring assist to thread (L) LE and jared bilateral shoes with cueing.     General Precautions: Standard, fall, deaf     Orthopedic Precautions:LLE weight bearing as tolerated     Braces: N/A    Rehab Prognosis: Good; patient would benefit from acute skilled OT services to address these deficits and reach maximum level of function.      History:     Past Medical History:   Diagnosis Date    Anticoagulant long-term use     Deaf     History of DVT (deep vein thrombosis)     Primary osteoarthritis of left knee        Past Surgical History:   Procedure Laterality Date    CHOLECYSTECTOMY      KNEE SURGERY      TOTAL KNEE ARTHROPLASTY Left 1/11/2024    Procedure: ARTHROPLASTY, KNEE, TOTAL;  Surgeon: Phoenix Godoy MD;  Location: [facility];  Service: Orthopedics;  Laterality: Left;       Subjective     Orientation: Oriented x4    Chief Complaint: weakness and pain      Patient/Family Comments/goals: Pt would like to improve her overall independence with ADL's including functional  mobility.        Respiratory Status: Room air    Patients cultural, spiritual, Islam conflicts given the current situation: no       Objective:     Patient found up in chair with    upon OT entry to room.    Mobility   Patient completed:  Sit to Stand Transfer with contact guard assistance with rolling walker  Bed to Chair Transfer using Step Transfer technique with contact guard assistance with rolling walker    ADLs   Current Status   Eating     Oral Hygiene     Shower, Bathe Self     Upper Body Dressing 4   Lower Body Dressing 3   Toileting Hygiene     Toilet Transfer     Putting On, Taking Off Footwear 3     Limiting Factors for ADLs: endurance, limited ROM, balance, weakness, body habitus, coordination, cognition, safety awareness, and pain     Therapeutic Activities  Pt participated in therapeutic activity addressing trunk mobility, trunk control, dynamic sitting balance, and trunk strength utilizing large stability ball challenging her to perform trunk flexion, extension, and lateral flexion requiring verbal and tactile cueing to facilitate optimal movement patterns and increased range per trial in order to improve active ROM impacting performance with functional tasks below waist.     Therapeutic Exercise  Pt participated in therapeutic exercise performing 5x13 seated pushups requiring verbal and tactile cueing for technique challenging him to lift buttocks off seated surface to end range elbow extension in order to strengthen triceps brachii to improve performance with sit <> stand transitions particularly from lower surfaces. Pt then participated in therapeutic exercise performing 3x15 bilateral UE proximal strengthening exercises utilizing heavy resistance theraband with scapular retraction, shoulder extension, shoulder adduction, horizontal abduction, shoulder flexion in plane of scaption, internal rotation and external rotation requiring verbal and tactile cueing for technique while emphasizing  quality of movement.      Additional Treatments: Pt participated in ADL retraining as further noted above emphasizing use of adaptive equipment, assistive devices, and compensatory strategies providing extra time with demonstration requiring tactile cueing for safety awareness and technique with additional written instruction.      LifeStyle Change and Education:             Patient left HOB elevated with call button in reach and nurse notified.     Education provided: Roles and goals of OT, ADLs, transfer training, bed mobility, body mechanics, assistive device, wheelchair precautions, safety precautions, fall prevention, post-op precautions, equipment recommendations, and home safety    Multidisciplinary Problems       Occupational Therapy Goals          Problem: Occupational Therapy    Goal Priority Disciplines Outcome Interventions   Occupational Therapy Goal     OT, PT/OT Ongoing, Progressing    Description:  Long Term Goals to be met by: 01/30/24     Patient will increase functional independence with ADLs by performing:    Feeding with [place].  UE Dressing with Modified [place].  LE Dressing with Supervision.  Grooming while seated at sink with Modified [place].  Toileting from toilet with Set-up Assistance for hygiene and clothing management.   Bathing from  shower chair/bench with Set-up Assistance.  Toilet transfer to toilet with Set-up Assistance.  Increased functional strength to 4+/5 to 5/5 for bilateral UE's.                         Time Tracking     OT Received On: 01/23/24  Time In 1000     Time Out 1130  Total Time 90 min  Therapy Time: OT Individual: 60  OT Concurrent: 30  Missed Time:    Missed Time Reason:      Billable Minutes: Self Care/Home Management 30, Therapeutic Activity 15, and Therapeutic Exercise 45    01/23/2024   no

## 2024-01-23 NOTE — PLAN OF CARE
Problem: Occupational Therapy  Goal: Occupational Therapy Goal  Description:  Long Term Goals to be met by: 01/30/24     Patient will increase functional independence with ADLs by performing:    Feeding with [place].  UE Dressing with Modified [place].  LE Dressing with Supervision.  Grooming while seated at sink with Modified [place].  Toileting from toilet with Set-up Assistance for hygiene and clothing management.   Bathing from  shower chair/bench with Set-up Assistance.  Toilet transfer to toilet with Set-up Assistance.  Increased functional strength to 4+/5 to 5/5 for bilateral UE's.    Outcome: Ongoing, Progressing

## 2024-01-23 NOTE — PT/OT/SLP PROGRESS
"Physical Therapy Inpatient Rehab Treatment    Patient Name:  Lupe Santizo   MRN:  [id number]    Recommendations:     Discharge Recommendations:  Low Intensity Therapy   Discharge Equipment Recommendations: bedside commode, bath bench, walker, rolling   Barriers to discharge: Inaccessible home    Assessment:     Lupe Santizo is a 43 y.o. female admitted with a medical diagnosis of Status post total left knee replacement.  She presents with the following impairments/functional limitations: weakness, pain, cognitive deficits, impaired coordination, impaired balance, gait deviations, difficulty with functional transfers, difficulty with bed mobility, difficulty with wheelchair propulsion, impaired sensation, impaired proprioception, decreased motor control, decreased safety, and decreased endurance. These limitations are reducing the patient's functional mobility and independence.     Patient tolerated treatment today with more frequent complaints of fatigue and being "sleepy". Constant redirection required to convince patient to perform functional activities like ambulation. Patient tolerated prone lying with soft tissue mobilization to the hamstring for ~10 minutes with reports of pain throughout the intervention.     Rehab Diagnosis: L TKA    Recent Surgery: L TKA    General Precautions: Standard, fall, deaf     Orthopedic Precautions:LLE weight bearing as tolerated     Braces: N/A    Rehab Prognosis: Fair; patient would benefit from acute skilled PT services to address these deficits and reach maximum level of function.      History:     Past Medical History:   Diagnosis Date    Anticoagulant long-term use     Deaf     History of DVT (deep vein thrombosis)     Primary osteoarthritis of left knee        Past Surgical History:   Procedure Laterality Date    CHOLECYSTECTOMY      KNEE SURGERY      TOTAL KNEE ARTHROPLASTY Left 1/11/2024    Procedure: ARTHROPLASTY, KNEE, TOTAL;  Surgeon: Phoenix Godoy MD;  " "Location: [facility] OR;  Service: Orthopedics;  Laterality: Left;       Subjective     Chief Complaint: "Pain"    Respiratory Status: Room air    Patients cultural, spiritual, Mandaen conflicts given the current situation: no      Objective:     Communicated with patient and nurse prior to session.  Patient found up in chair with    upon PT entry to room.    Pt is Oriented x3 and Alert and Cooperative.    Vitals   Vitals at Rest  /66 mmHg   HR 90 bpm   O2 Sat 98%   Pain 2/10 pain L knee       Functional Mobility:   Bed Mobility:     Rolling Left:  Supervision or touching assistance   Rolling Right: Supervision or touching assistance   Scooting: Supervision or touching assistance   Supine to Sit: Supervision or touching assistance   Sit to Supine: Supervision or touching assistance   Transfers:     Sit to Stand: Supervision or touching assistance  with rolling walker  Chair to mat: Supervision or touching assistance  with  rolling walker  using  Stand Pivot  Gait: Pt ambulates 8 ft, 20 ft, 22 ft, and 40 ft with RW with Supervision or touching assistance .   Balance: Static Sitting/Standing  Patient performed static sitting on low mat using  no AD  with Supervision or touching assistance  and minimal verbal cues.    Static Sit: FAIR+: Able to take MINIMAL challenges from all directions  Static Stand: FAIR: Maintains without assist but unable to take challenges  Wheelchair Propulsion:  Pt propelled Standard wheelchair x 300+ feet on Level tile with  Right upper extremity and Left upper extremity with Independent.      Current   Status  Discharge   Goal   Functional Area: Care Score:    Roll Left and Right 4 Set-up/clean-up   Sit to Lying 4 Set-up/clean-up   Lying to Sitting on Side of Bed 4 Set-up/clean-up   Sit to Stand 4 Set-up/clean-up   Chair/Bed-to-Chair Transfer 4 Set-up/clean-up   Car Transfer 10 Supervision or touching assistance   Walk 10 Feet 4 Supervision or touching assistance   Walk 50 Feet with Two " Turns 88 Supervision or touching assistance   Walk 150 Feet 88 Supervision or touching assistance   Walk 10 Feet Uneven Surface 88 Supervision or touching assistance   1 Step (Curb) 88 Supervision or touching assistance   4 Steps 88 Supervision or touching assistance   12 Steps 88 Not applicable   Picking Up Object 88 Set-up/clean-up   Wheel 50 Feet with Two Turns 6 Independent   Wheel 150 Feet 6 Independent       Therapeutic Activities and Exercises:  Wheelchair management training  Balance training (seated)  Gait training  LE exercises   LAQ   Prone hangs with soft tissue mobilization to L hamstring   Bed mobility training  Transfer training  Patient education    Activity Tolerance: Good and Fair    Patient left up in chair with  nursing present.    Education provided: roles and goals of PT/PTA, transfer training, bed mob, gait training, balance training, safety awareness, body mechanics, assistive device, wheelchair management, strengthening exercises, and fall prevention    Expected compliance: Moderate compliance and Low compliance    GOALS:   Multidisciplinary Problems       Physical Therapy Goals          Problem: Physical Therapy    Goal Priority Disciplines Outcome Goal Variances Interventions   Physical Therapy Goal     PT, PT/OT Ongoing, Progressing     Description:   Short Term Goals: 1/24/2024   Long Term Goals: 1/31/2024     Transfers Status    Sit to Stand  Short Term Goal: Complete sit to stand with Stand-by Assistance using Rolling Walker with minimal  verbal cues (MET 1/22/2024)  Long Term Goal:  Complete sit to stand with Supervision or Set-up Assistance using Rolling Walker (MET 1/22/2024)    Stand Step  Short Term Goal: Complete stand step with  Stand-by Assistance  using Rolling Walker with minimal   verbal cues (MET 1/22/2024)  Long Term Goal:  Complete stand step with  Supervision or Set-up Assistance  using Rolling Walker (MET 1/22/2024)    Supine <> Sit    Long Term Goal: Complete supine <>  sit with Supervision or Set-up Assistance  (MET 2024)    Rolling Right/Left  Long Term Goal: Complete rolling  right/left with Supervision or Set-up Assistance (MET 2024)    Balance/Coordination    Static Sitting  Long Term Goal: Complete static sitting with Modified Independent (MET 2024)    Dynamic Sitting  Long Term Goal: Complete dynamic sitting with Supervision or Set-up Assistance  with  minimal  verbal cues minimal excursions (MET 2024)    Static Standing  Short Term Goal: Complete static standing with Stand-by Assistance with  minimal  verbal cues and RW (MET 2024)  Long Term Goal: Complete static standing with Supervision or Set-up Assistance  with   minimal   verbal cues and RW (MET 2024)    Dynamic Standing  Short Term Goal: Complete dynamic standing with Stand-by Assistance  with minimal   verbal cues minimal  excursions with RW (MET 2024)  Long Term Goal: Complete dynamic standing with Supervision or Set-up Assistance with minimal   verbal cues and minimal  excursions with RW (MET 2024)    Endurance    Short Term Goal:   Physical Therapy: 2 times a day, 30-45 minutes (MET 2024)    Rest periods: multiple (MET 2024)    Sitting: 3 hours/day (MET 2024)    Long Term Goal:  Physical Therapy: 2 times a day, 45 minutes (in progress)    Rest periods: minimal (in progress)    Sittin-8 hours/day (in progress)      Mobility/Gait  Short Term Goal: Will ambulate with Stand-by Assistance  Using Rolling Walker with minimal   verbal cues x 100 ft (in progress)  Long Term Goal: Will ambulate with Stand-by Assistance  using Rolling Walker with minimal  verbal cues x 150 ft (in progress)     Stairs Assistance  Long Term Goal: Patient will ascend/descend 4 stairs/steps using both  handrails  nonreciprocal steps with Minimal Assistance and minimal  verbal cues (in progress)    Wheelchair Skills/Surface  Long Term Goal: Patient will propel Standard wheelchair x 300  feet with Level tileWITH Bilateral upper extremity with Modified Independent (MET 1/22/2024)      Ramp/Uneven 10 feet surface  Long  Term Goal; Patient will be able to navigate a 10 inch uneven surface with proper A.D. with  contact guard assistance/stand by assistance (in progress)      2-6  inch curb  Long  Term Goal; Patient will be able to navigate a  2  inch curb with RW with  minimal assistance (in progress)    Picking up object   Long  Term Goal; Patient will be able to  a small object from the floor  with a RW . with  stand by assistance (in progress)    Car transfers  Long  Term Goal; Patient will be able to get in and out of a vehicle  with RW  with  minimal assistance (in progress)    Education  Long Term Goals:  Patient/family/caregivers trained on physical mobility and precautions with Supervision. (In progress)    Patient/family/caregivers trained on safety awareness with Supervision. (In progress)    Order and obtain all appropriate equipment. (In progress)                          Plan:     During this hospitalization, patient to be seen 5 x/week to address the identified rehab impairments via gait training, therapeutic activities, therapeutic exercises, neuromuscular re-education, wheelchair management/training and progress toward the following goals:    Plan of Care Expires:  01/31/24  PT Next Visit Date: 01/24/24  Plan of Care reviewed with: patient    Additional Information:     Utilizing concurrent therapy to address goals related to safe bed mobility, transfers, and  gait activities.      Time Tracking:     Therapy Time  PT Received On: 01/23/24  PT Start Time: 0830  PT Stop Time: 1000  PT Total Time (min): 90 min   PT Concurrent: 90      Billable Minutes: Gait Training 15, Therapeutic Activity 30, Therapeutic Exercise 30, and Train/Wheelchair Management 15    01/23/2024

## 2024-01-23 NOTE — PLAN OF CARE
01/23/24 1436   Post-Acute Status   Post-Acute Authorization HME   HME Status Referrals Sent       Referral for RW and BSC submitted to [facility]. Bath bench not covered by patient's insurance. Bath bench not covered by patient's insurance.  Patient's mother made aware and states she will get bath bench on her own and does not want to use a local DME company.

## 2024-01-24 LAB
ALBUMIN SERPL BCP-MCNC: 3.3 G/DL (ref 3.5–5.2)
ALP SERPL-CCNC: 79 U/L (ref 55–135)
ALT SERPL W/O P-5'-P-CCNC: 37 U/L (ref 10–44)
ANION GAP SERPL CALC-SCNC: 4 MMOL/L (ref 3–11)
AST SERPL-CCNC: 37 U/L (ref 10–40)
BASOPHILS # BLD AUTO: 0.08 K/UL (ref 0–0.2)
BASOPHILS NFR BLD: 0.8 % (ref 0–1.9)
BILIRUB SERPL-MCNC: 0.5 MG/DL (ref 0.1–1)
BUN SERPL-MCNC: 13 MG/DL (ref 6–20)
CALCIUM SERPL-MCNC: 9.3 MG/DL (ref 8.7–10.5)
CHLORIDE SERPL-SCNC: 105 MMOL/L (ref 95–110)
CO2 SERPL-SCNC: 29 MMOL/L (ref 23–29)
CREAT SERPL-MCNC: 1 MG/DL (ref 0.5–1.4)
DIFFERENTIAL METHOD BLD: ABNORMAL
EOSINOPHIL # BLD AUTO: 0.3 K/UL (ref 0–0.5)
EOSINOPHIL NFR BLD: 3 % (ref 0–8)
ERYTHROCYTE [DISTWIDTH] IN BLOOD BY AUTOMATED COUNT: 15.1 % (ref 11.5–14.5)
EST. GFR  (NO RACE VARIABLE): >60 ML/MIN/1.73 M^2
GLUCOSE SERPL-MCNC: 111 MG/DL (ref 70–110)
HCT VFR BLD AUTO: 39.2 % (ref 37–48.5)
HGB BLD-MCNC: 12.4 G/DL (ref 12–16)
IMM GRANULOCYTES # BLD AUTO: 0.03 K/UL (ref 0–0.04)
IMM GRANULOCYTES NFR BLD AUTO: 0.3 % (ref 0–0.5)
LYMPHOCYTES # BLD AUTO: 2.3 K/UL (ref 1–4.8)
LYMPHOCYTES NFR BLD: 22.1 % (ref 18–48)
MCH RBC QN AUTO: 27 PG (ref 27–31)
MCHC RBC AUTO-ENTMCNC: 31.6 G/DL (ref 32–36)
MCV RBC AUTO: 85 FL (ref 82–98)
MONOCYTES # BLD AUTO: 0.7 K/UL (ref 0.3–1)
MONOCYTES NFR BLD: 7.3 % (ref 4–15)
NEUTROPHILS # BLD AUTO: 6.8 K/UL (ref 1.8–7.7)
NEUTROPHILS NFR BLD: 66.5 % (ref 38–73)
NRBC BLD-RTO: 0 /100 WBC
PLATELET # BLD AUTO: 347 K/UL (ref 150–450)
PMV BLD AUTO: 11.6 FL (ref 9.2–12.9)
POTASSIUM SERPL-SCNC: 3.8 MMOL/L (ref 3.5–5.1)
PROT SERPL-MCNC: 7.7 G/DL (ref 6–8.4)
RBC # BLD AUTO: 4.6 M/UL (ref 4–5.4)
SODIUM SERPL-SCNC: 138 MMOL/L (ref 136–145)
WBC # BLD AUTO: 10.2 K/UL (ref 3.9–12.7)

## 2024-01-24 PROCEDURE — 11800000 HC REHAB PRIVATE ROOM

## 2024-01-24 PROCEDURE — 97110 THERAPEUTIC EXERCISES: CPT

## 2024-01-24 PROCEDURE — 80053 COMPREHEN METABOLIC PANEL: CPT | Performed by: INTERNAL MEDICINE

## 2024-01-24 PROCEDURE — 97530 THERAPEUTIC ACTIVITIES: CPT

## 2024-01-24 PROCEDURE — 97116 GAIT TRAINING THERAPY: CPT

## 2024-01-24 PROCEDURE — 36415 COLL VENOUS BLD VENIPUNCTURE: CPT | Performed by: INTERNAL MEDICINE

## 2024-01-24 PROCEDURE — 25000003 PHARM REV CODE 250: Performed by: STUDENT IN AN ORGANIZED HEALTH CARE EDUCATION/TRAINING PROGRAM

## 2024-01-24 PROCEDURE — 85025 COMPLETE CBC W/AUTO DIFF WBC: CPT | Performed by: INTERNAL MEDICINE

## 2024-01-24 PROCEDURE — 25000003 PHARM REV CODE 250: Performed by: INTERNAL MEDICINE

## 2024-01-24 PROCEDURE — 25000003 PHARM REV CODE 250

## 2024-01-24 PROCEDURE — 97535 SELF CARE MNGMENT TRAINING: CPT

## 2024-01-24 PROCEDURE — 97542 WHEELCHAIR MNGMENT TRAINING: CPT

## 2024-01-24 RX ADMIN — ZIPRASIDONE HYDROCHLORIDE 40 MG: 40 CAPSULE ORAL at 08:01

## 2024-01-24 RX ADMIN — RIVAROXABAN 20 MG: 10 TABLET, FILM COATED ORAL at 04:01

## 2024-01-24 RX ADMIN — ACETAMINOPHEN 650 MG: 325 TABLET ORAL at 11:01

## 2024-01-24 RX ADMIN — OXYCODONE HYDROCHLORIDE 5 MG: 5 TABLET ORAL at 08:01

## 2024-01-24 RX ADMIN — FAMOTIDINE 20 MG: 20 TABLET, FILM COATED ORAL at 08:01

## 2024-01-24 RX ADMIN — POLYETHYLENE GLYCOL (3350) 17 G: 17 POWDER, FOR SOLUTION ORAL at 08:01

## 2024-01-24 RX ADMIN — GABAPENTIN 300 MG: 300 CAPSULE ORAL at 03:01

## 2024-01-24 RX ADMIN — SENNOSIDES AND DOCUSATE SODIUM 1 TABLET: 8.6; 5 TABLET ORAL at 08:01

## 2024-01-24 RX ADMIN — GABAPENTIN 300 MG: 300 CAPSULE ORAL at 08:01

## 2024-01-24 RX ADMIN — OXYCODONE HYDROCHLORIDE 5 MG: 5 TABLET ORAL at 03:01

## 2024-01-24 NOTE — ASSESSMENT & PLAN NOTE
Patient's pain seems to be fairly well controlled postoperatively agree with current regimen and adjust as needed.  PT/OT evaluations reviewed.  1/21 Left knee pain on scale of 6 out of 10, after pain meds, pain reduced to 2 out of 10.   1/22:  Patient reports 1/10 left knee pain that is well-controlled with p.r.n. medications.  Surgical dressing intact.  No drainage noted.  Patient has appointment with orthopedic surgeon on the 29th of this month.  Surgeon states he will remove dressing and staples at that time.  1/23 FM:  Doing well with txt, home Friday.

## 2024-01-24 NOTE — PT/OT/SLP PROGRESS
Occupational Therapy Inpatient Rehab Treatment    Name: Lupe Santizo  MRN: [id number]    Assessment:  Lupe Santizo is a 43 y.o. female admitted with a medical diagnosis of Status post total left knee replacement.  She presents with the following impairments/functional limitations:  weakness, impaired endurance, impaired self care skills, impaired functional mobility, gait instability, impaired balance, visual deficits, impaired cognition, decreased coordination, decreased upper extremity function, decreased lower extremity function, decreased safety awareness, pain, decreased ROM, impaired fine motor, impaired skin, edema, impaired cardiopulmonary response to activity, impaired joint extensibility, impaired muscle length.    Pt demonstrated improved functional performance with LB dressing and toileting as noted by CGA in which patient able to perform most tasks with steadying assist or tactile cueing.    General Precautions: Standard, deaf, fall     Orthopedic Precautions:LLE weight bearing as tolerated     Braces: N/A    Rehab Prognosis: Good; patient would benefit from acute skilled OT services to address these deficits and reach maximum level of function.      History:     Past Medical History:   Diagnosis Date    Anticoagulant long-term use     Deaf     History of DVT (deep vein thrombosis)     Primary osteoarthritis of left knee        Past Surgical History:   Procedure Laterality Date    CHOLECYSTECTOMY      KNEE SURGERY      TOTAL KNEE ARTHROPLASTY Left 1/11/2024    Procedure: ARTHROPLASTY, KNEE, TOTAL;  Surgeon: Phoenix Godoy MD;  Location: [facility];  Service: Orthopedics;  Laterality: Left;       Subjective     Orientation: Oriented x4    Chief Complaint: weakness and pain     Patient/Family Comments/goals: Pt would like to improve her overall independence with ADL's including functional mobility.         Respiratory Status: Room air    Patients cultural, spiritual, Judaism conflicts given the  current situation: no       Objective:     Patient found up in chair with    upon OT entry to room.    Mobility   Patient completed:  Sit to Stand Transfer with supervision with rolling walker  Bed to Chair Transfer using Step Transfer technique with supervision with rolling walker  Toilet Transfer Step Transfer technique with supervision with  grab bars    ADLs   Current Status   Eating     Oral Hygiene     Shower, Bathe Self     Upper Body Dressing     Lower Body Dressing 4   Toileting Hygiene 4   Toilet Transfer 4   Putting On, Taking Off Footwear 4     Limiting Factors for ADLs: endurance, limited ROM, balance, weakness, body habitus, coordination, cognition, safety awareness, and pain     Therapeutic Activities  Pt participated in functional transfer retraining to / from wheelchair, chair, and toilet emphasizing fall prevention with use of assistive device providing extra time with repetition requiring supervision secondary to safety concerns.       Therapeutic Exercise  Pt participated in therapeutic exercise performing 3x15 bilateral UE proximal strengthening exercises utilizing extra heavy resistance theraband with scapular retraction, shoulder extension, shoulder adduction, horizontal abduction, shoulder flexion in plane of scaption, internal rotation and external rotation requiring verbal and tactile cueing for technique while emphasizing quality of movement.       Additional Treatments: Pt participated in ADL retraining as further noted above emphasizing use of adaptive equipment, assistive devices, and compensatory strategies providing extra time with demonstration requiring tactile cueing for safety awareness and technique with additional written instruction.       LifeStyle Change and Education:             Patient left up in chair with  nurse notified.     Education provided: Roles and goals of OT, ADLs, transfer training, bed mobility, body mechanics, assistive device, wheelchair precautions, safety  precautions, fall prevention, post-op precautions, equipment recommendations, and home safety    Multidisciplinary Problems       Occupational Therapy Goals          Problem: Occupational Therapy    Goal Priority Disciplines Outcome Interventions   Occupational Therapy Goal     OT, PT/OT Ongoing, Progressing    Description:  Long Term Goals to be met by: 01/30/24     Patient will increase functional independence with ADLs by performing:    Feeding with [place].  UE Dressing with Modified [place].  LE Dressing with Supervision.  Grooming while seated at sink with Modified [place].  Toileting from toilet with Set-up Assistance for hygiene and clothing management.   Bathing from  shower chair/bench with Set-up Assistance.  Toilet transfer to toilet with Set-up Assistance.  Increased functional strength to 4+/5 to 5/5 for bilateral UE's.                         Time Tracking     OT Received On: 01/24/24  Time In 1050     Time Out 1220  Total Time 90 min  Therapy Time: OT Individual: 60  OT Concurrent: 30  Missed Time:    Missed Time Reason:      Billable Minutes: Self Care/Home Management 30, Therapeutic Activity 30, and Therapeutic Exercise 30    01/24/2024

## 2024-01-24 NOTE — SUBJECTIVE & OBJECTIVE
Past Medical History:   Diagnosis Date    Anticoagulant long-term use     Deaf     History of DVT (deep vein thrombosis)     Primary osteoarthritis of left knee        Past Surgical History:   Procedure Laterality Date    CHOLECYSTECTOMY      KNEE SURGERY      TOTAL KNEE ARTHROPLASTY Left 1/11/2024    Procedure: ARTHROPLASTY, KNEE, TOTAL;  Surgeon: Phoenix Godoy MD;  Location: [facility];  Service: Orthopedics;  Laterality: Left;       Review of patient's allergies indicates:  No Known Allergies    Current Facility-Administered Medications on File Prior to Encounter   Medication    methylPREDNISolone acetate injection 40 mg     Current Outpatient Medications on File Prior to Encounter   Medication Sig    acetaminophen (TYLENOL) 500 MG tablet Take 2 tablets (1,000 mg total) by mouth every 8 (eight) hours as needed for Pain.    diclofenac (VOLTAREN) 75 MG EC tablet Take 1 tablet (75 mg total) by mouth 2 (two) times daily.    gabapentin (NEURONTIN) 300 MG capsule Take 1 capsule (300 mg total) by mouth 3 (three) times daily.    methocarbamoL (ROBAXIN) 750 MG Tab Take 1 tablet (750 mg total) by mouth 3 (three) times daily as needed (Pain not relieved with Tylenol, diclofenac, or gabapentin).    oxyCODONE (ROXICODONE) 5 MG immediate release tablet Take 1 tablet (5 mg total) by mouth every 4 (four) hours as needed for Pain (pain not relieved by all other medications).    rivaroxaban (XARELTO) 20 mg Tab Take 20 mg by mouth.    ziprasidone (GEODON) 40 MG Cap TAKE 1 CAPSULE BY MOUTH DAILY AT BEDTIME TAKE WITH FOOD     Family History    None       Tobacco Use    Smoking status: Never    Smokeless tobacco: Current   Substance and Sexual Activity    Alcohol use: Never    Drug use: Never    Sexual activity: Not Currently     Review of Systems   Unable to perform ROS: Patient nonverbal     Objective:     Vital Signs (Most Recent):  Temp: 98.9 °F (37.2 °C) (01/24/24 0418)  Pulse: 87 (01/24/24 0418)  Resp: 18 (01/24/24 0800)  BP: (!)  115/57 (01/24/24 0418)  SpO2: 97 % (01/24/24 0418) Vital Signs (24h Range):  Temp:  [98 °F (36.7 °C)-98.9 °F (37.2 °C)] 98.9 °F (37.2 °C)  Pulse:  [87-93] 87  Resp:  [18] 18  SpO2:  [97 %-98 %] 97 %  BP: ()/(54-57) 115/57     Weight: 100.4 kg (221 lb 6.4 oz)  Body mass index is 38 kg/m².     Physical Exam  Constitutional:       General: She is awake. She is not in acute distress.     Appearance: Normal appearance. She is obese. She is not toxic-appearing.   HENT:      Head: Normocephalic and atraumatic.      Nose: Nose normal. No congestion or rhinorrhea.      Mouth/Throat:      Mouth: Mucous membranes are moist.      Pharynx: Oropharynx is clear. No oropharyngeal exudate or posterior oropharyngeal erythema.   Eyes:      General: No scleral icterus.        Right eye: No discharge.         Left eye: No discharge.      Extraocular Movements: Extraocular movements intact.   Neck:      Thyroid: No thyroid mass or thyromegaly.      Vascular: No carotid bruit.      Meningeal: Brudzinski's sign and Kernig's sign absent.   Cardiovascular:      Rate and Rhythm: Normal rate and regular rhythm.      Chest Wall: PMI is not displaced. No thrill.      Pulses: Normal pulses.      Heart sounds: Normal heart sounds. No murmur heard.     No friction rub. No gallop.   Pulmonary:      Effort: Pulmonary effort is normal. No tachypnea, accessory muscle usage, prolonged expiration or respiratory distress.      Breath sounds: Normal breath sounds. No stridor or decreased air movement. No wheezing, rhonchi or rales.   Chest:      Chest wall: No tenderness.   Abdominal:      General: Bowel sounds are normal. There is no distension.      Palpations: Abdomen is soft. There is no hepatomegaly, splenomegaly or mass.      Tenderness: There is no abdominal tenderness. There is no right CVA tenderness, left CVA tenderness, guarding or rebound.      Hernia: No hernia is present.   Musculoskeletal:         General: Tenderness present. No  swelling or deformity.      Cervical back: Neck supple. No rigidity. No muscular tenderness.      Right lower leg: No edema.      Left lower leg: Edema present.      Comments: Surgical site clear   Lymphadenopathy:      Cervical: No cervical adenopathy.   Skin:     General: Skin is warm.      Capillary Refill: Capillary refill takes less than 2 seconds.      Coloration: Skin is not cyanotic, jaundiced or pale.      Findings: No erythema, petechiae or rash.   Neurological:      Mental Status: She is alert. Mental status is at baseline.      Cranial Nerves: No cranial nerve deficit, dysarthria or facial asymmetry.      Motor: Weakness present. No tremor.   Psychiatric:         Mood and Affect: Mood is not anxious or depressed. Affect is not flat.         Speech: Speech is not rapid and pressured or slurred.         Behavior: Behavior is not agitated, aggressive or combative.         Thought Content: Thought content is not paranoid or delusional.         Cognition and Memory: Cognition is not impaired. Memory is not impaired.      Comments: + learning impairment, + deaf              Significant Imaging: I have reviewed all pertinent imaging results/findings within the past 24 hours.

## 2024-01-24 NOTE — PLAN OF CARE
Problem: Occupational Therapy  Goal: Occupational Therapy Goal  Description:  Long Term Goals to be met by: 01/30/24     Patient will increase functional independence with ADLs by performing:    Feeding with [place].  UE Dressing with Modified [place].  LE Dressing with Supervision.  Grooming while seated at sink with Modified [place].  Toileting from toilet with Set-up Assistance for hygiene and clothing management.   Bathing from  shower chair/bench with Set-up Assistance.  Toilet transfer to toilet with Set-up Assistance.  Increased functional strength to 4+/5 to 5/5 for bilateral UE's.    Outcome: Ongoing, Progressing

## 2024-01-24 NOTE — ASSESSMENT & PLAN NOTE
Chronic, controlled. Latest blood pressure and vitals reviewed-   BP Readings from Last 3 Encounters:   01/24/24 (!) 115/57   01/16/24 115/75   12/18/23 (!) 146/91      Temp:  [98 °F (36.7 °C)-98.9 °F (37.2 °C)]   Pulse:  [87-93]   Resp:  [18]   BP: ()/(54-57)   SpO2:  [97 %-98 %] .   Home meds for hypertension were reviewed and noted below.     While in the hospital, will manage blood pressure as follows; Continue home antihypertensive regimen    Will utilize p.r.n. blood pressure medication only if patient's blood pressure greater than 140/90 and she develops symptoms such as worsening chest pain or shortness of breath.

## 2024-01-24 NOTE — PROGRESS NOTES
[facility] Medicine  Progress Note    Patient Name: Lupe Santizo  MRN: [id number]  Patient Class: IP- Rehab   Admission Date: 1/16/2024  Length of Stay: 8 days  Attending Physician: Ricardo Mckeon III, MD  Primary Care Provider: Sandra Duggan MD        Subjective:     Principal Problem:Status post total left knee replacement        HPI:  History of present illness:      Lupe Santizo is a 43 y.o. female who presents for evaluation of their left knee pain. Mother reports daughter's pain has been present for years. Patient is both unable to hear and unable to speak, therefore history is from mother who is also MPOA. Patient also has a learning disability. Mother reports that the knee is the primary source of pain for the patient. Pain is mostly located medially. She had an injection at her last visit which gave her temporarily relief. She is on Xarelto for a DVT following gallbladder surgery, which she will be on for life. Patient returns today for f/u of L knee OA. She has since obtained clearance form her PCP and cardiologist which shows that she is low to intermediate risk and is optimized for surgery and okay to hold Xarelto 3 days preop that she is a high risk for recurrent DVT. She would like to proceed with TKA.      Patient underwent a successful left total knee arthroplasty and postoperatively her recovery has been uneventful.  Her care team reached out to us because of her learning impairment and communication challenges and felt that it was safest for her to recover and an inpatient rehab setting to avoid further complications and morbidity.  I have seen and evaluated the patient this morning we are communicating through a tablet patient is smiling her mood seems to be good she is complaining of pain at the knee her surgical site seems clear her dressing does have some serosanguineous discharge.  Patient's chart has been reviewed and reconciled all medications  updated.     Pt. Requires acute inpatient rehab admission with 24-hour nursing and active physician oversight to monitor and manage acute medical comorbid conditions, labs, pain, and functional deficits. Patient/family will also require teaching and integration of improving functional skills into daily living. She will also require an individualized, interdisciplinary approach to her care, receiving PT, OT services 3 hours per day, 5 days per week. Required care cannot be provided at a lower level of care. Patient is anticipated to require approximately 10-14 days LOS with expected discharge home with mother and with      Impairment group (IGC):   Unilateral Kneee Replacements 08.61 Etiologic diagnosis/description:   M17.12: Osteoarthritis of left knee   Date of onset:  1/11/2024 Date of surgery:  1/11/2024   Allergies: Patient has no known allergies.   Comorbid condition: Deaf, hx of CVA, unable to speak, hx of DVT, OA   Medical/functional conditions requiring inpatient rehabilitation:      This patient requires medical management/24-hour nursing of complex co morbidities Deaf, hx of CVA, unable to speak, hx of DVT, OA, labs, medications (see medications list), pain, sleep hygiene, anticoagulation, nutrition, hydration, neurological,  and preventive healthcare.     This patient requires intense therapy and an integrated, interdisciplinary approach to address safety, impaired mobility, impaired ADLs (dressing, toileting, grooming, showering), judgment, and memory, communication, bowel/bladder problems,preventive healthcare, medication management, integration of functional skills into daily living, and home caregiver support and training.      Risk for medical/clinical complications:      This patient is at risk for the following complications: DVT/PE, pneumonia, malnutrition, worsening activity intolerance, complications from anticoagulation,  skin breakdown, inadequate sleep, recurring stroke, and constipation.         Overview/Hospital Course:  01/18 CP: Pt c/o pain/soreness after working with therapy. Also reports loose stools. Med orders placed. Pt is in good spirits and working hard with PT this am. Encouraged good therapy efforts. Unable to visualize surgical site today.  1/19 DL:  Patient doing well.  No acute events reported.  Labs and vital signs stable.  Patient doing well with therapy.  Encouraged to continue great therapy efforts.  1/21 [place] weekend crossover. No reported concerns with therapy efforts.   1/22 DL:  Patient doing well.  She is sitting in dining room and is awake, alert, oriented.  Vital signs stable.  Patient reports mild pain that is well controlled with current p.r.n. medications.  Surgical dressing to left knee dry and intact.  We have contacted patient's orthopedic surgeon who states patient has appointment 1/29.  Surgeon states he will remove dressing and staples at that appointment.  Patient continues to tolerate therapy well.  Encouraged to keep up great therapy efforts.  1/23 FM:  Patient seen and examined after team conference, patient's mood is good she is smiling patient has regained function and getting close to her baseline.  May discharge home before the weekend as she has improved more rapidly than expected.  1/24 DL:  Patient continues to do well.  No acute events reported.  Labs and vital signs stable.  Patient continues to be highly motivated and is working well with therapy.  Patient encouraged to continue great therapy efforts.    Past Medical History:   Diagnosis Date    Anticoagulant long-term use     Deaf     History of DVT (deep vein thrombosis)     Primary osteoarthritis of left knee        Past Surgical History:   Procedure Laterality Date    CHOLECYSTECTOMY      KNEE SURGERY      TOTAL KNEE ARTHROPLASTY Left 1/11/2024    Procedure: ARTHROPLASTY, KNEE, TOTAL;  Surgeon: Phoenix Godoy MD;  Location: [facility];  Service: Orthopedics;  Laterality: Left;       Review of patient's  allergies indicates:  No Known Allergies    Current Facility-Administered Medications on File Prior to Encounter   Medication    methylPREDNISolone acetate injection 40 mg     Current Outpatient Medications on File Prior to Encounter   Medication Sig    acetaminophen (TYLENOL) 500 MG tablet Take 2 tablets (1,000 mg total) by mouth every 8 (eight) hours as needed for Pain.    diclofenac (VOLTAREN) 75 MG EC tablet Take 1 tablet (75 mg total) by mouth 2 (two) times daily.    gabapentin (NEURONTIN) 300 MG capsule Take 1 capsule (300 mg total) by mouth 3 (three) times daily.    methocarbamoL (ROBAXIN) 750 MG Tab Take 1 tablet (750 mg total) by mouth 3 (three) times daily as needed (Pain not relieved with Tylenol, diclofenac, or gabapentin).    oxyCODONE (ROXICODONE) 5 MG immediate release tablet Take 1 tablet (5 mg total) by mouth every 4 (four) hours as needed for Pain (pain not relieved by all other medications).    rivaroxaban (XARELTO) 20 mg Tab Take 20 mg by mouth.    ziprasidone (GEODON) 40 MG Cap TAKE 1 CAPSULE BY MOUTH DAILY AT BEDTIME TAKE WITH FOOD     Family History    None       Tobacco Use    Smoking status: Never    Smokeless tobacco: Current   Substance and Sexual Activity    Alcohol use: Never    Drug use: Never    Sexual activity: Not Currently     Review of Systems   Unable to perform ROS: Patient nonverbal     Objective:     Vital Signs (Most Recent):  Temp: 98.9 °F (37.2 °C) (01/24/24 0418)  Pulse: 87 (01/24/24 0418)  Resp: 18 (01/24/24 0800)  BP: (!) 115/57 (01/24/24 0418)  SpO2: 97 % (01/24/24 0418) Vital Signs (24h Range):  Temp:  [98 °F (36.7 °C)-98.9 °F (37.2 °C)] 98.9 °F (37.2 °C)  Pulse:  [87-93] 87  Resp:  [18] 18  SpO2:  [97 %-98 %] 97 %  BP: ()/(54-57) 115/57     Weight: 100.4 kg (221 lb 6.4 oz)  Body mass index is 38 kg/m².     Physical Exam  Constitutional:       General: She is awake. She is not in acute distress.     Appearance: Normal appearance. She is obese. She is not  toxic-appearing.   HENT:      Head: Normocephalic and atraumatic.      Nose: Nose normal. No congestion or rhinorrhea.      Mouth/Throat:      Mouth: Mucous membranes are moist.      Pharynx: Oropharynx is clear. No oropharyngeal exudate or posterior oropharyngeal erythema.   Eyes:      General: No scleral icterus.        Right eye: No discharge.         Left eye: No discharge.      Extraocular Movements: Extraocular movements intact.   Neck:      Thyroid: No thyroid mass or thyromegaly.      Vascular: No carotid bruit.      Meningeal: Brudzinski's sign and Kernig's sign absent.   Cardiovascular:      Rate and Rhythm: Normal rate and regular rhythm.      Chest Wall: PMI is not displaced. No thrill.      Pulses: Normal pulses.      Heart sounds: Normal heart sounds. No murmur heard.     No friction rub. No gallop.   Pulmonary:      Effort: Pulmonary effort is normal. No tachypnea, accessory muscle usage, prolonged expiration or respiratory distress.      Breath sounds: Normal breath sounds. No stridor or decreased air movement. No wheezing, rhonchi or rales.   Chest:      Chest wall: No tenderness.   Abdominal:      General: Bowel sounds are normal. There is no distension.      Palpations: Abdomen is soft. There is no hepatomegaly, splenomegaly or mass.      Tenderness: There is no abdominal tenderness. There is no right CVA tenderness, left CVA tenderness, guarding or rebound.      Hernia: No hernia is present.   Musculoskeletal:         General: Tenderness present. No swelling or deformity.      Cervical back: Neck supple. No rigidity. No muscular tenderness.      Right lower leg: No edema.      Left lower leg: Edema present.      Comments: Surgical site clear   Lymphadenopathy:      Cervical: No cervical adenopathy.   Skin:     General: Skin is warm.      Capillary Refill: Capillary refill takes less than 2 seconds.      Coloration: Skin is not cyanotic, jaundiced or pale.      Findings: No erythema, petechiae or  rash.   Neurological:      Mental Status: She is alert. Mental status is at baseline.      Cranial Nerves: No cranial nerve deficit, dysarthria or facial asymmetry.      Motor: Weakness present. No tremor.   Psychiatric:         Mood and Affect: Mood is not anxious or depressed. Affect is not flat.         Speech: Speech is not rapid and pressured or slurred.         Behavior: Behavior is not agitated, aggressive or combative.         Thought Content: Thought content is not paranoid or delusional.         Cognition and Memory: Cognition is not impaired. Memory is not impaired.      Comments: + learning impairment, + deaf              Significant Imaging: I have reviewed all pertinent imaging results/findings within the past 24 hours.    Assessment/Plan:      * Status post total left knee replacement  Patient's pain seems to be fairly well controlled postoperatively agree with current regimen and adjust as needed.  PT/OT evaluations reviewed.  1/21 Left knee pain on scale of 6 out of 10, after pain meds, pain reduced to 2 out of 10.   1/22:  Patient reports 1/10 left knee pain that is well-controlled with p.r.n. medications.  Surgical dressing intact.  No drainage noted.  Patient has appointment with orthopedic surgeon on the 29th of this month.  Surgeon states he will remove dressing and staples at that time.  1/23 FM:  Doing well with txt, home Friday.    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      Essential hypertension  Chronic, controlled. Latest blood pressure and vitals reviewed-   BP Readings from Last 3 Encounters:   01/24/24 (!) 115/57   01/16/24 115/75   12/18/23 (!) 146/91      Temp:  [98 °F (36.7 °C)-98.9 °F (37.2 °C)]   Pulse:  [87-93]   Resp:  [18]   BP: ()/(54-57)   SpO2:   [97 %-98 %] .   Home meds for hypertension were reviewed and noted below.     While in the hospital, will manage blood pressure as follows; Continue home antihypertensive regimen    Will utilize p.r.n. blood pressure medication only if patient's blood pressure greater than 140/90 and she develops symptoms such as worsening chest pain or shortness of breath.    Bilateral deafness  Patient able to communicate with tablet and has some basic reading and writing skills.      Obesity (BMI 30-39.9)  Body mass index is 38 kg/m². Morbid obesity complicates all aspects of disease management from diagnostic modalities to treatment. Weight loss encouraged and health benefits explained to patient.         Primary osteoarthritis of left knee  Patient's pain seems to be fairly well controlled postoperatively agree with current regimen and adjust as needed.        VTE Risk Mitigation (From admission, onward)           Ordered     rivaroxaban tablet 20 mg  with dinner         01/16/24 1435     IP VTE LOW RISK PATIENT  Once         01/16/24 1435     Place sequential compression device  Until discontinued         01/16/24 1435                    Discharge Planning   FRED:      Code Status: Full Code   Is the patient medically ready for discharge?:     Reason for patient still in hospital (select all that apply): Patient trending condition and PT / OT recommendations  Discharge Plan A: Rehab                  Leslie Guidry NP  Department of Hospital Medicine   [facility])

## 2024-01-24 NOTE — PT/OT/SLP PROGRESS
Physical Therapy Inpatient Rehab Treatment    Patient Name:  Lupe Santizo   MRN:  [id number]    Recommendations:     Discharge Recommendations:  Low Intensity Therapy   Discharge Equipment Recommendations: bath bench, bedside commode, walker, rolling   Barriers to discharge: Inaccessible home and Decreased caregiver support    Assessment:     Lupe Santizo is a 43 y.o. female admitted with a medical diagnosis of Status post total left knee replacement.  She presents with the following impairments/functional limitations: weakness, pain, cognitive deficits, impaired coordination, impaired balance, gait deviations, difficulty with functional transfers, difficulty with bed mobility, difficulty with wheelchair propulsion, impaired sensation, impaired proprioception, decreased motor control, decreased safety, and decreased endurance. These limitations are reducing the patient's functional mobility and independence.     Patient still needing moderate amount of encouragement and redirection throughout treatment sessions. Patient negotiated up and down 4 steps today for the first time with moderate to maximal assistance x2 people. Still lacking ~20 degrees of passive L knee extension. Strength of L Quadriceps contractions improving daily. Patient continues to be self limited in her functional gains with ambulation and obstacle negotiation secondary to a combination of fear avoidance behaviors and pain.    Rehab Diagnosis: L TKA    Recent Surgery: L TKA    General Precautions: Standard, deaf, fall     Orthopedic Precautions:LLE weight bearing as tolerated     Braces: N/A    Rehab Prognosis: Fair; patient would benefit from acute skilled PT services to address these deficits and reach maximum level of function.      History:     Past Medical History:   Diagnosis Date    Anticoagulant long-term use     Deaf     History of DVT (deep vein thrombosis)     Primary osteoarthritis of left knee        Past Surgical History:  "  Procedure Laterality Date    CHOLECYSTECTOMY      KNEE SURGERY      TOTAL KNEE ARTHROPLASTY Left 1/11/2024    Procedure: ARTHROPLASTY, KNEE, TOTAL;  Surgeon: Phoenix Godoy MD;  [facility];  Service: Orthopedics;  Laterality: Left;       Subjective     Chief Complaint: "this (ambulating up ramp) is hard" and "knee pain"    Respiratory Status: Room air    Patients cultural, spiritual, Lutheran conflicts given the current situation: no      Objective:     Communicated with patient and nurse prior to session.  Patient found up in chair upon PT entry to room.    Pt is Oriented x3 and Alert.    Vitals   Vitals at Rest  /62 mmHg    bpm   O2 Sat 98%   Pain 2/10 pain L knee       Functional Mobility:   Bed Mobility:     Rolling Left:  Set-up or clean-up assistance   Rolling Right: Set-up or clean-up assistance   Scooting: Set-up or clean-up assistance   Supine to Sit: Set-up or clean-up assistance   Sit to Supine: Set-up or clean-up assistance   Transfers:     Sit to Stand: Supervision or touching assistance  with rolling walker  Chair to mat: Supervision or touching assistance  with  rolling walker  using  Stand Pivot  Gait: Pt ambulates 12 ft, 50 ft, then 15 ft with RW with Supervision or touching assistance .   Up and down 4 (6inch) steps stairs with bilateral handrails with Substantial/maximal assistance   One 2 inch curb with rolling walker with Substantial/maximal assistance   Ambulate 10 feet on uneven surfaces/ramps with rolling walker with Substantial/maximal assistance   Wheelchair Propulsion:  Pt propelled Standard wheelchair x 300+ feet on Level tile with  Right upper extremity and Left upper extremity with Set-up or clean-up assistance .      Current   Status  Discharge   Goal   Functional Area: Care Score:    Roll Left and Right 5 Set-up/clean-up   Sit to Lying 5 Set-up/clean-up   Lying to Sitting on Side of Bed 5 Set-up/clean-up   Sit to Stand 4 Set-up/clean-up   Chair/Bed-to-Chair " Transfer 4 Set-up/clean-up   Car Transfer 10 Supervision or touching assistance   Walk 10 Feet 4 Supervision or touching assistance   Walk 50 Feet with Two Turns 4 Supervision or touching assistance   Walk 150 Feet 88 Supervision or touching assistance   Walk 10 Feet Uneven Surface 2 Supervision or touching assistance   1 Step (Curb) 2 Supervision or touching assistance   4 Steps 2 Supervision or touching assistance   12 Steps 88 Not applicable   Picking Up Object 88 Set-up/clean-up   Wheel 50 Feet with Two Turns 5 Independent   Wheel 150 Feet 5 Independent       Therapeutic Activities and Exercises:  Wheelchair training  Transfer training  Bed mobility training  Gait training level and uneven surfaces  Stair training  Therapeutic exercise    LAQ x 30   Prone lying with gravity assisted L knee extension and overpressure with soft tissue mobilization to L hamstring    Activity Tolerance: Fair    Patient left up in chair with  nurse notified.    Education provided: roles and goals of PT/PTA, transfer training, bed mob, gait training, stair training, balance training, safety awareness, body mechanics, assistive device, wheelchair management, and strengthening exercises    Expected compliance: Moderate compliance    GOALS:   Multidisciplinary Problems       Physical Therapy Goals          Problem: Physical Therapy    Goal Priority Disciplines Outcome Goal Variances Interventions   Physical Therapy Goal     PT, PT/OT Ongoing, Progressing     Description:   Short Term Goals: 1/24/2024   Long Term Goals: 1/31/2024     Transfers Status    Sit to Stand  Short Term Goal: Complete sit to stand with Stand-by Assistance using Rolling Walker with minimal  verbal cues (MET 1/22/2024)  Long Term Goal:  Complete sit to stand with Supervision or Set-up Assistance using Rolling Walker (MET 1/22/2024)    Stand Step  Short Term Goal: Complete stand step with  Stand-by Assistance  using Rolling Walker with minimal   verbal cues (MET  2024)  Long Term Goal:  Complete stand step with  Supervision or Set-up Assistance  using Rolling Walker (MET 2024)    Supine <> Sit    Long Term Goal: Complete supine <> sit with Supervision or Set-up Assistance  (MET 2024)    Rolling Right/Left  Long Term Goal: Complete rolling  right/left with Supervision or Set-up Assistance (MET 2024)    Balance/Coordination    Static Sitting  Long Term Goal: Complete static sitting with Modified Independent (MET 2024)    Dynamic Sitting  Long Term Goal: Complete dynamic sitting with Supervision or Set-up Assistance  with  minimal  verbal cues minimal excursions (MET 2024)    Static Standing  Short Term Goal: Complete static standing with Stand-by Assistance with  minimal  verbal cues and RW (MET 2024)  Long Term Goal: Complete static standing with Supervision or Set-up Assistance  with   minimal   verbal cues and RW (MET 2024)    Dynamic Standing  Short Term Goal: Complete dynamic standing with Stand-by Assistance  with minimal   verbal cues minimal  excursions with RW (MET 2024)  Long Term Goal: Complete dynamic standing with Supervision or Set-up Assistance with minimal   verbal cues and minimal  excursions with RW (MET 2024)    Endurance    Short Term Goal:   Physical Therapy: 2 times a day, 30-45 minutes (MET 2024)    Rest periods: multiple (MET 2024)    Sitting: 3 hours/day (MET 2024)    Long Term Goal:  Physical Therapy: 2 times a day, 45 minutes (in progress)    Rest periods: minimal (in progress)    Sittin-8 hours/day (in progress)      Mobility/Gait  Short Term Goal: Will ambulate with Stand-by Assistance  Using Rolling Walker with minimal   verbal cues x 100 ft (in progress)  Long Term Goal: Will ambulate with Stand-by Assistance  using Rolling Walker with minimal  verbal cues x 150 ft (in progress)     Stairs Assistance  Long Term Goal: Patient will ascend/descend 4 stairs/steps using both   handrails  nonreciprocal steps with Minimal Assistance and minimal  verbal cues (in progress)    Wheelchair Skills/Surface  Long Term Goal: Patient will propel Standard wheelchair x 300 feet with Level tileWITH Bilateral upper extremity with Modified Independent (MET 1/22/2024)      Ramp/Uneven 10 feet surface  Long  Term Goal; Patient will be able to navigate a 10 inch uneven surface with proper A.D. with  contact guard assistance/stand by assistance (in progress)      2-6  inch curb  Long  Term Goal; Patient will be able to navigate a  2  inch curb with RW with  minimal assistance (in progress)    Picking up object   Long  Term Goal; Patient will be able to  a small object from the floor  with a RW . with  stand by assistance (in progress)    Car transfers  Long  Term Goal; Patient will be able to get in and out of a vehicle  with RW  with  minimal assistance (in progress)    Education  Long Term Goals:  Patient/family/caregivers trained on physical mobility and precautions with Supervision. (In progress)    Patient/family/caregivers trained on safety awareness with Supervision. (In progress)    Order and obtain all appropriate equipment. (In progress)                          Plan:     During this hospitalization, patient to be seen 5 x/week to address the identified rehab impairments via gait training, therapeutic activities, therapeutic exercises, neuromuscular re-education, wheelchair management/training and progress toward the following goals:    Plan of Care Expires:  01/31/24  PT Next Visit Date: 01/25/24  Plan of Care reviewed with: patient    Additional Information:     Utilizing concurrent therapy to address goals related to safe bed mobility, transfers, and  gait activities.      Time Tracking:     Therapy Time  PT Received On: 01/24/24  PT Start Time: 0830  PT Stop Time: 1000  PT Total Time (min): 90 min   PT Concurrent: 90    Billable Minutes: Gait Training 30, Therapeutic Activity 30,  Therapeutic Exercise 15, and Train/Wheelchair Management 15    01/24/2024

## 2024-01-24 NOTE — SUBJECTIVE & OBJECTIVE
Past Medical History:   Diagnosis Date    Anticoagulant long-term use     Deaf     History of DVT (deep vein thrombosis)     Primary osteoarthritis of left knee        Past Surgical History:   Procedure Laterality Date    CHOLECYSTECTOMY      KNEE SURGERY      TOTAL KNEE ARTHROPLASTY Left 1/11/2024    Procedure: ARTHROPLASTY, KNEE, TOTAL;  Surgeon: Phoenix Godoy MD;  [facility];  Service: Orthopedics;  Laterality: Left;       Review of patient's allergies indicates:  No Known Allergies    Current Facility-Administered Medications on File Prior to Encounter   Medication    methylPREDNISolone acetate injection 40 mg     Current Outpatient Medications on File Prior to Encounter   Medication Sig    acetaminophen (TYLENOL) 500 MG tablet Take 2 tablets (1,000 mg total) by mouth every 8 (eight) hours as needed for Pain.    diclofenac (VOLTAREN) 75 MG EC tablet Take 1 tablet (75 mg total) by mouth 2 (two) times daily.    gabapentin (NEURONTIN) 300 MG capsule Take 1 capsule (300 mg total) by mouth 3 (three) times daily.    methocarbamoL (ROBAXIN) 750 MG Tab Take 1 tablet (750 mg total) by mouth 3 (three) times daily as needed (Pain not relieved with Tylenol, diclofenac, or gabapentin).    oxyCODONE (ROXICODONE) 5 MG immediate release tablet Take 1 tablet (5 mg total) by mouth every 4 (four) hours as needed for Pain (pain not relieved by all other medications).    rivaroxaban (XARELTO) 20 mg Tab Take 20 mg by mouth.    ziprasidone (GEODON) 40 MG Cap TAKE 1 CAPSULE BY MOUTH DAILY AT BEDTIME TAKE WITH FOOD     Family History    None       Tobacco Use    Smoking status: Never    Smokeless tobacco: Current   Substance and Sexual Activity    Alcohol use: Never    Drug use: Never    Sexual activity: Not Currently     Review of Systems   Unable to perform ROS: Patient nonverbal     Objective:     Vital Signs (Most Recent):  Temp: 98.9 °F (37.2 °C) (01/24/24 0418)  Pulse: 87 (01/24/24 0418)  Resp: 18 (01/24/24 0800)  BP: (!)  115/57 (01/24/24 0418)  SpO2: 97 % (01/24/24 0418) Vital Signs (24h Range):  Temp:  [98 °F (36.7 °C)-98.9 °F (37.2 °C)] 98.9 °F (37.2 °C)  Pulse:  [87-93] 87  Resp:  [18] 18  SpO2:  [97 %-98 %] 97 %  BP: ()/(54-57) 115/57     Weight: 100.4 kg (221 lb 6.4 oz)  Body mass index is 38 kg/m².     Physical Exam  Constitutional:       General: She is awake. She is not in acute distress.     Appearance: Normal appearance. She is obese. She is not toxic-appearing.   HENT:      Head: Normocephalic and atraumatic.      Nose: Nose normal. No congestion or rhinorrhea.      Mouth/Throat:      Mouth: Mucous membranes are moist.      Pharynx: Oropharynx is clear. No oropharyngeal exudate or posterior oropharyngeal erythema.   Eyes:      General: No scleral icterus.        Right eye: No discharge.         Left eye: No discharge.      Extraocular Movements: Extraocular movements intact.   Neck:      Thyroid: No thyroid mass or thyromegaly.      Vascular: No carotid bruit.      Meningeal: Brudzinski's sign and Kernig's sign absent.   Cardiovascular:      Rate and Rhythm: Normal rate and regular rhythm.      Chest Wall: PMI is not displaced. No thrill.      Pulses: Normal pulses.      Heart sounds: Normal heart sounds. No murmur heard.     No friction rub. No gallop.   Pulmonary:      Effort: Pulmonary effort is normal. No tachypnea, accessory muscle usage, prolonged expiration or respiratory distress.      Breath sounds: Normal breath sounds. No stridor or decreased air movement. No wheezing, rhonchi or rales.   Chest:      Chest wall: No tenderness.   Abdominal:      General: Bowel sounds are normal. There is no distension.      Palpations: Abdomen is soft. There is no hepatomegaly, splenomegaly or mass.      Tenderness: There is no abdominal tenderness. There is no right CVA tenderness, left CVA tenderness, guarding or rebound.      Hernia: No hernia is present.   Musculoskeletal:         General: Tenderness present. No  swelling or deformity.      Cervical back: Neck supple. No rigidity. No muscular tenderness.      Right lower leg: No edema.      Left lower leg: Edema present.      Comments: Surgical site clear   Lymphadenopathy:      Cervical: No cervical adenopathy.   Skin:     General: Skin is warm.      Capillary Refill: Capillary refill takes less than 2 seconds.      Coloration: Skin is not cyanotic, jaundiced or pale.      Findings: No erythema, petechiae or rash.   Neurological:      Mental Status: She is alert. Mental status is at baseline.      Cranial Nerves: No cranial nerve deficit, dysarthria or facial asymmetry.      Motor: Weakness present. No tremor.   Psychiatric:         Mood and Affect: Mood is not anxious or depressed. Affect is not flat.         Speech: Speech is not rapid and pressured or slurred.         Behavior: Behavior is not agitated, aggressive or combative.         Thought Content: Thought content is not paranoid or delusional.         Cognition and Memory: Cognition is not impaired. Memory is not impaired.      Comments: + learning impairment, + deaf              Significant Imaging: I have reviewed all pertinent imaging results/findings within the past 24 hours.

## 2024-01-24 NOTE — PLAN OF CARE
No complaints or needs voiced this shift, assisted up to the bathroom for all bathroom needs, call bell remains in reach, bed low with side rails up X2

## 2024-01-24 NOTE — PROGRESS NOTES
[facility] Medicine  Progress Note    Patient Name: Lupe Santizo  MRN: [id number]  Patient Class: IP- Rehab   Admission Date: 1/16/2024  Length of Stay: 8 days  Attending Physician: Ricardo Mckeon III, MD  Primary Care Provider: Sandra Duggan MD        Subjective:     Principal Problem:Status post total left knee replacement        HPI:  History of present illness:      Lupe Santizo is a 43 y.o. female who presents for evaluation of their left knee pain. Mother reports daughter's pain has been present for years. Patient is both unable to hear and unable to speak, therefore history is from mother who is also MPOA. Patient also has a learning disability. Mother reports that the knee is the primary source of pain for the patient. Pain is mostly located medially. She had an injection at her last visit which gave her temporarily relief. She is on Xarelto for a DVT following gallbladder surgery, which she will be on for life. Patient returns today for f/u of L knee OA. She has since obtained clearance form her PCP and cardiologist which shows that she is low to intermediate risk and is optimized for surgery and okay to hold Xarelto 3 days preop that she is a high risk for recurrent DVT. She would like to proceed with TKA.      Patient underwent a successful left total knee arthroplasty and postoperatively her recovery has been uneventful.  Her care team reached out to us because of her learning impairment and communication challenges and felt that it was safest for her to recover and an inpatient rehab setting to avoid further complications and morbidity.  I have seen and evaluated the patient this morning we are communicating through a tablet patient is smiling her mood seems to be good she is complaining of pain at the knee her surgical site seems clear her dressing does have some serosanguineous discharge.  Patient's chart has been reviewed and reconciled all medications  updated.     Pt. Requires acute inpatient rehab admission with 24-hour nursing and active physician oversight to monitor and manage acute medical comorbid conditions, labs, pain, and functional deficits. Patient/family will also require teaching and integration of improving functional skills into daily living. She will also require an individualized, interdisciplinary approach to her care, receiving PT, OT services 3 hours per day, 5 days per week. Required care cannot be provided at a lower level of care. Patient is anticipated to require approximately 10-14 days LOS with expected discharge home with mother and with      Impairment group (IGC):   Unilateral Kneee Replacements 08.61 Etiologic diagnosis/description:   M17.12: Osteoarthritis of left knee   Date of onset:  1/11/2024 Date of surgery:  1/11/2024   Allergies: Patient has no known allergies.   Comorbid condition: Deaf, hx of CVA, unable to speak, hx of DVT, OA   Medical/functional conditions requiring inpatient rehabilitation:      This patient requires medical management/24-hour nursing of complex co morbidities Deaf, hx of CVA, unable to speak, hx of DVT, OA, labs, medications (see medications list), pain, sleep hygiene, anticoagulation, nutrition, hydration, neurological,  and preventive healthcare.     This patient requires intense therapy and an integrated, interdisciplinary approach to address safety, impaired mobility, impaired ADLs (dressing, toileting, grooming, showering), judgment, and memory, communication, bowel/bladder problems,preventive healthcare, medication management, integration of functional skills into daily living, and home caregiver support and training.      Risk for medical/clinical complications:      This patient is at risk for the following complications: DVT/PE, pneumonia, malnutrition, worsening activity intolerance, complications from anticoagulation,  skin breakdown, inadequate sleep, recurring stroke, and constipation.         Overview/Hospital Course:  01/18 CP: Pt c/o pain/soreness after working with therapy. Also reports loose stools. Med orders placed. Pt is in good spirits and working hard with PT this am. Encouraged good therapy efforts. Unable to visualize surgical site today.  1/19 DL:  Patient doing well.  No acute events reported.  Labs and vital signs stable.  Patient doing well with therapy.  Encouraged to continue great therapy efforts.  1/21 [place] weekend crossover. No reported concerns with therapy efforts.   1/22 DL:  Patient doing well.  She is sitting in dining room and is awake, alert, oriented.  Vital signs stable.  Patient reports mild pain that is well controlled with current p.r.n. medications.  Surgical dressing to left knee dry and intact.  We have contacted patient's orthopedic surgeon who states patient has appointment 1/29.  Surgeon states he will remove dressing and staples at that appointment.  Patient continues to tolerate therapy well.  Encouraged to keep up great therapy efforts.  1/23 FM:  Patient seen and examined after team conference, patient's mood is good she is smiling patient has regained function and getting close to her baseline.  May discharge home before the weekend as she has improved more rapidly than expected.    Past Medical History:   Diagnosis Date    Anticoagulant long-term use     Deaf     History of DVT (deep vein thrombosis)     Primary osteoarthritis of left knee        Past Surgical History:   Procedure Laterality Date    CHOLECYSTECTOMY      KNEE SURGERY      TOTAL KNEE ARTHROPLASTY Left 1/11/2024    Procedure: ARTHROPLASTY, KNEE, TOTAL;  Surgeon: Phoenix Godoy MD;  Location: [facility];  Service: Orthopedics;  Laterality: Left;       Review of patient's allergies indicates:  No Known Allergies    Current Facility-Administered Medications on File Prior to Encounter   Medication    methylPREDNISolone acetate injection 40 mg     Current Outpatient Medications on File Prior to  Encounter   Medication Sig    acetaminophen (TYLENOL) 500 MG tablet Take 2 tablets (1,000 mg total) by mouth every 8 (eight) hours as needed for Pain.    diclofenac (VOLTAREN) 75 MG EC tablet Take 1 tablet (75 mg total) by mouth 2 (two) times daily.    gabapentin (NEURONTIN) 300 MG capsule Take 1 capsule (300 mg total) by mouth 3 (three) times daily.    methocarbamoL (ROBAXIN) 750 MG Tab Take 1 tablet (750 mg total) by mouth 3 (three) times daily as needed (Pain not relieved with Tylenol, diclofenac, or gabapentin).    oxyCODONE (ROXICODONE) 5 MG immediate release tablet Take 1 tablet (5 mg total) by mouth every 4 (four) hours as needed for Pain (pain not relieved by all other medications).    rivaroxaban (XARELTO) 20 mg Tab Take 20 mg by mouth.    ziprasidone (GEODON) 40 MG Cap TAKE 1 CAPSULE BY MOUTH DAILY AT BEDTIME TAKE WITH FOOD     Family History    None       Tobacco Use    Smoking status: Never    Smokeless tobacco: Current   Substance and Sexual Activity    Alcohol use: Never    Drug use: Never    Sexual activity: Not Currently     Review of Systems   Unable to perform ROS: Patient nonverbal     Objective:     Vital Signs (Most Recent):  Temp: 98.9 °F (37.2 °C) (01/24/24 0418)  Pulse: 87 (01/24/24 0418)  Resp: 18 (01/24/24 0800)  BP: (!) 115/57 (01/24/24 0418)  SpO2: 97 % (01/24/24 0418) Vital Signs (24h Range):  Temp:  [98 °F (36.7 °C)-98.9 °F (37.2 °C)] 98.9 °F (37.2 °C)  Pulse:  [87-93] 87  Resp:  [18] 18  SpO2:  [97 %-98 %] 97 %  BP: ()/(54-57) 115/57     Weight: 100.4 kg (221 lb 6.4 oz)  Body mass index is 38 kg/m².     Physical Exam  Constitutional:       General: She is awake. She is not in acute distress.     Appearance: Normal appearance. She is obese. She is not toxic-appearing.   HENT:      Head: Normocephalic and atraumatic.      Nose: Nose normal. No congestion or rhinorrhea.      Mouth/Throat:      Mouth: Mucous membranes are moist.      Pharynx: Oropharynx is clear. No oropharyngeal  exudate or posterior oropharyngeal erythema.   Eyes:      General: No scleral icterus.        Right eye: No discharge.         Left eye: No discharge.      Extraocular Movements: Extraocular movements intact.   Neck:      Thyroid: No thyroid mass or thyromegaly.      Vascular: No carotid bruit.      Meningeal: Brudzinski's sign and Kernig's sign absent.   Cardiovascular:      Rate and Rhythm: Normal rate and regular rhythm.      Chest Wall: PMI is not displaced. No thrill.      Pulses: Normal pulses.      Heart sounds: Normal heart sounds. No murmur heard.     No friction rub. No gallop.   Pulmonary:      Effort: Pulmonary effort is normal. No tachypnea, accessory muscle usage, prolonged expiration or respiratory distress.      Breath sounds: Normal breath sounds. No stridor or decreased air movement. No wheezing, rhonchi or rales.   Chest:      Chest wall: No tenderness.   Abdominal:      General: Bowel sounds are normal. There is no distension.      Palpations: Abdomen is soft. There is no hepatomegaly, splenomegaly or mass.      Tenderness: There is no abdominal tenderness. There is no right CVA tenderness, left CVA tenderness, guarding or rebound.      Hernia: No hernia is present.   Musculoskeletal:         General: Tenderness present. No swelling or deformity.      Cervical back: Neck supple. No rigidity. No muscular tenderness.      Right lower leg: No edema.      Left lower leg: Edema present.      Comments: Surgical site clear   Lymphadenopathy:      Cervical: No cervical adenopathy.   Skin:     General: Skin is warm.      Capillary Refill: Capillary refill takes less than 2 seconds.      Coloration: Skin is not cyanotic, jaundiced or pale.      Findings: No erythema, petechiae or rash.   Neurological:      Mental Status: She is alert. Mental status is at baseline.      Cranial Nerves: No cranial nerve deficit, dysarthria or facial asymmetry.      Motor: Weakness present. No tremor.   Psychiatric:          Mood and Affect: Mood is not anxious or depressed. Affect is not flat.         Speech: Speech is not rapid and pressured or slurred.         Behavior: Behavior is not agitated, aggressive or combative.         Thought Content: Thought content is not paranoid or delusional.         Cognition and Memory: Cognition is not impaired. Memory is not impaired.      Comments: + learning impairment, + deaf              Significant Imaging: I have reviewed all pertinent imaging results/findings within the past 24 hours.    Assessment/Plan:      * Status post total left knee replacement  Patient's pain seems to be fairly well controlled postoperatively agree with current regimen and adjust as needed.  PT/OT evaluations reviewed.  1/21 Left knee pain on scale of 6 out of 10, after pain meds, pain reduced to 2 out of 10.   1/22:  Patient reports 1/10 left knee pain that is well-controlled with p.r.n. medications.  Surgical dressing intact.  No drainage noted.  Patient has appointment with orthopedic surgeon on the 29th of this month.  Surgeon states he will remove dressing and staples at that time.  1/23 FM:  Doing well with txt, home Friday.    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      Essential hypertension  Chronic, controlled. Latest blood pressure and vitals reviewed-   BP Readings from Last 3 Encounters:   01/24/24 (!) 115/57   01/16/24 115/75   12/18/23 (!) 146/91      Temp:  [98 °F (36.7 °C)-98.9 °F (37.2 °C)]   Pulse:  [87-93]   Resp:  [18]   BP: ()/(54-57)   SpO2:  [97 %-98 %] .   Home meds for hypertension were reviewed and noted below.     While in the hospital, will manage blood pressure as follows; Continue home antihypertensive regimen    Will utilize p.r.n. blood pressure medication  only if patient's blood pressure greater than 140/90 and she develops symptoms such as worsening chest pain or shortness of breath.    Bilateral deafness  Patient able to communicate with tablet and has some basic reading and writing skills.      Obesity (BMI 30-39.9)  Body mass index is 38 kg/m². Morbid obesity complicates all aspects of disease management from diagnostic modalities to treatment. Weight loss encouraged and health benefits explained to patient.         Primary osteoarthritis of left knee  Patient's pain seems to be fairly well controlled postoperatively agree with current regimen and adjust as needed.        VTE Risk Mitigation (From admission, onward)           Ordered     rivaroxaban tablet 20 mg  with dinner         01/16/24 1435     IP VTE LOW RISK PATIENT  Once         01/16/24 1435     Place sequential compression device  Until discontinued         01/16/24 1435                    Discharge Planning   FRED:      Code Status: Full Code   Is the patient medically ready for discharge?:     Reason for patient still in hospital (select all that apply): Patient trending condition  Discharge Plan A: Rehab                  Ricardo Mckeon III, MD  Department of Hospital Medicine   [facility] ([facility])

## 2024-01-25 PROCEDURE — 25000003 PHARM REV CODE 250: Performed by: INTERNAL MEDICINE

## 2024-01-25 PROCEDURE — 97530 THERAPEUTIC ACTIVITIES: CPT

## 2024-01-25 PROCEDURE — 97116 GAIT TRAINING THERAPY: CPT

## 2024-01-25 PROCEDURE — 25000003 PHARM REV CODE 250

## 2024-01-25 PROCEDURE — 97110 THERAPEUTIC EXERCISES: CPT

## 2024-01-25 PROCEDURE — 11800000 HC REHAB PRIVATE ROOM

## 2024-01-25 PROCEDURE — 25000003 PHARM REV CODE 250: Performed by: STUDENT IN AN ORGANIZED HEALTH CARE EDUCATION/TRAINING PROGRAM

## 2024-01-25 PROCEDURE — 97535 SELF CARE MNGMENT TRAINING: CPT

## 2024-01-25 RX ADMIN — GABAPENTIN 300 MG: 300 CAPSULE ORAL at 08:01

## 2024-01-25 RX ADMIN — POLYETHYLENE GLYCOL (3350) 17 G: 17 POWDER, FOR SOLUTION ORAL at 08:01

## 2024-01-25 RX ADMIN — FAMOTIDINE 20 MG: 20 TABLET, FILM COATED ORAL at 08:01

## 2024-01-25 RX ADMIN — RIVAROXABAN 20 MG: 10 TABLET, FILM COATED ORAL at 05:01

## 2024-01-25 RX ADMIN — GABAPENTIN 300 MG: 300 CAPSULE ORAL at 03:01

## 2024-01-25 RX ADMIN — SENNOSIDES AND DOCUSATE SODIUM 1 TABLET: 8.6; 5 TABLET ORAL at 08:01

## 2024-01-25 RX ADMIN — METHOCARBAMOL TABLETS 750 MG: 750 TABLET, COATED ORAL at 01:01

## 2024-01-25 RX ADMIN — ZIPRASIDONE HYDROCHLORIDE 40 MG: 40 CAPSULE ORAL at 08:01

## 2024-01-25 RX ADMIN — ACETAMINOPHEN 650 MG: 325 TABLET ORAL at 08:01

## 2024-01-25 NOTE — PLAN OF CARE
01/25/24 1425   Post-Acute Status   Post-Acute Authorization HME;Placement   Post-Acute Placement Status Set-up Complete/Auth obtained   HME Status (!) Pending Delivery   Discharge Plan   Discharge Plan A Rehab   Discharge Plan B Rehab       Patient discharging tomorrow 1/16/2024. Patient accepted by [facility] OP rehab and will start OP therapy w/zhao on Monday 1/26/2024. DME to be delivered by [facility]. Confirmed w/Carlita who states DME will be delivered today, 1/25/2024.

## 2024-01-25 NOTE — SUBJECTIVE & OBJECTIVE
Past Medical History:   Diagnosis Date    Anticoagulant long-term use     Deaf     History of DVT (deep vein thrombosis)     Primary osteoarthritis of left knee        Past Surgical History:   Procedure Laterality Date    CHOLECYSTECTOMY      KNEE SURGERY      TOTAL KNEE ARTHROPLASTY Left 1/11/2024    Procedure: ARTHROPLASTY, KNEE, TOTAL;  Surgeon: Phoenix Godoy MD;  Location: [facility];  Service: Orthopedics;  Laterality: Left;       Review of patient's allergies indicates:  No Known Allergies    Current Facility-Administered Medications on File Prior to Encounter   Medication    methylPREDNISolone acetate injection 40 mg     Current Outpatient Medications on File Prior to Encounter   Medication Sig    acetaminophen (TYLENOL) 500 MG tablet Take 2 tablets (1,000 mg total) by mouth every 8 (eight) hours as needed for Pain.    diclofenac (VOLTAREN) 75 MG EC tablet Take 1 tablet (75 mg total) by mouth 2 (two) times daily.    gabapentin (NEURONTIN) 300 MG capsule Take 1 capsule (300 mg total) by mouth 3 (three) times daily.    methocarbamoL (ROBAXIN) 750 MG Tab Take 1 tablet (750 mg total) by mouth 3 (three) times daily as needed (Pain not relieved with Tylenol, diclofenac, or gabapentin).    oxyCODONE (ROXICODONE) 5 MG immediate release tablet Take 1 tablet (5 mg total) by mouth every 4 (four) hours as needed for Pain (pain not relieved by all other medications).    rivaroxaban (XARELTO) 20 mg Tab Take 20 mg by mouth.    ziprasidone (GEODON) 40 MG Cap TAKE 1 CAPSULE BY MOUTH DAILY AT BEDTIME TAKE WITH FOOD     Family History    None       Tobacco Use    Smoking status: Never    Smokeless tobacco: Current   Substance and Sexual Activity    Alcohol use: Never    Drug use: Never    Sexual activity: Not Currently     Review of Systems   Unable to perform ROS: Patient nonverbal     Objective:     Vital Signs (Most Recent):  Temp: 97.7 °F (36.5 °C) (01/25/24 0448)  Pulse: 96 (01/25/24 0809)  Resp: 16 (01/25/24 0448)  BP:  109/68 (01/25/24 0809)  SpO2: 98 % (01/25/24 0448) Vital Signs (24h Range):  Temp:  [97.7 °F (36.5 °C)-97.9 °F (36.6 °C)] 97.7 °F (36.5 °C)  Pulse:  [78-96] 96  Resp:  [16-18] 16  SpO2:  [98 %-100 %] 98 %  BP: (109-128)/(64-68) 109/68     Weight: 100.4 kg (221 lb 6.4 oz)  Body mass index is 38 kg/m².     Physical Exam  Constitutional:       General: She is awake. She is not in acute distress.     Appearance: Normal appearance. She is obese. She is not toxic-appearing.   HENT:      Head: Normocephalic and atraumatic.      Nose: Nose normal. No congestion or rhinorrhea.      Mouth/Throat:      Mouth: Mucous membranes are moist.      Pharynx: Oropharynx is clear. No oropharyngeal exudate or posterior oropharyngeal erythema.   Eyes:      General: No scleral icterus.        Right eye: No discharge.         Left eye: No discharge.      Extraocular Movements: Extraocular movements intact.   Neck:      Thyroid: No thyroid mass or thyromegaly.      Vascular: No carotid bruit.      Meningeal: Brudzinski's sign and Kernig's sign absent.   Cardiovascular:      Rate and Rhythm: Normal rate and regular rhythm.      Chest Wall: PMI is not displaced. No thrill.      Pulses: Normal pulses.      Heart sounds: Normal heart sounds. No murmur heard.     No friction rub. No gallop.   Pulmonary:      Effort: Pulmonary effort is normal. No tachypnea, accessory muscle usage, prolonged expiration or respiratory distress.      Breath sounds: Normal breath sounds. No stridor or decreased air movement. No wheezing, rhonchi or rales.   Chest:      Chest wall: No tenderness.   Abdominal:      General: Bowel sounds are normal. There is no distension.      Palpations: Abdomen is soft. There is no hepatomegaly, splenomegaly or mass.      Tenderness: There is no abdominal tenderness. There is no right CVA tenderness, left CVA tenderness, guarding or rebound.      Hernia: No hernia is present.   Musculoskeletal:         General: Tenderness present. No  swelling or deformity.      Cervical back: Neck supple. No rigidity. No muscular tenderness.      Right lower leg: No edema.      Left lower leg: Edema present.      Comments: Surgical site clear   Lymphadenopathy:      Cervical: No cervical adenopathy.   Skin:     General: Skin is warm.      Capillary Refill: Capillary refill takes less than 2 seconds.      Coloration: Skin is not cyanotic, jaundiced or pale.      Findings: No erythema, petechiae or rash.   Neurological:      Mental Status: She is alert. Mental status is at baseline.      Cranial Nerves: No cranial nerve deficit, dysarthria or facial asymmetry.      Motor: Weakness present. No tremor.   Psychiatric:         Mood and Affect: Mood is not anxious or depressed. Affect is not flat.         Speech: Speech is not rapid and pressured or slurred.         Behavior: Behavior is not agitated, aggressive or combative.         Thought Content: Thought content is not paranoid or delusional.         Cognition and Memory: Cognition is not impaired. Memory is not impaired.      Comments: + learning impairment, + deaf              Significant Imaging: I have reviewed all pertinent imaging results/findings within the past 24 hours.

## 2024-01-25 NOTE — ASSESSMENT & PLAN NOTE
Patient's pain seems to be fairly well controlled postoperatively agree with current regimen and adjust as needed.  PT/OT evaluations reviewed.  1/21 Left knee pain on scale of 6 out of 10, after pain meds, pain reduced to 2 out of 10.   1/22:  Patient reports 1/10 left knee pain that is well-controlled with p.r.n. medications.  Surgical dressing intact.  No drainage noted.  Patient has appointment with orthopedic surgeon on the 29th of this month.  Surgeon states he will remove dressing and staples at that time.  1/23 FM:  Doing well with txt, home Friday.  1/25:  Patient continues to do well.  Patient anxious for discharge home tomorrow.  We will need to keep follow up appointment with Orthopedic surgery scheduled 1/29.

## 2024-01-25 NOTE — PT/OT/SLP PROGRESS
Physical Therapy Inpatient Rehab Treatment    Patient Name:  Lupe Santizo   MRN:  [id number]    Recommendations:     Discharge Recommendations:  Low Intensity Therapy   Discharge Equipment Recommendations: bath bench, bedside commode, walker, rolling   Barriers to discharge: None    Assessment:     Lupe Santizo is a 43 y.o. female admitted with a medical diagnosis of Status post total left knee replacement.  She presents with the following impairments/functional limitations:    weakness, pain, cognitive deficits, impaired coordination, impaired balance, gait deviations, difficulty with functional transfers, difficulty with bed mobility, difficulty with wheelchair propulsion, impaired sensation, impaired proprioception, decreased motor control, decreased safety, and decreased endurance.  .    Patient still needs constant encouragement to participate in functional task this morning, still with decrease left knee ROM , antalgic gait pattern and decrease strength on the left hip and knee. Family training scheduled later this morning.  Anticipate discharge to home with follow up from outpatient P.T.     Rehab Diagnosis:  L TKA, WBAT     Recent Surgery: * No surgery found *      General Precautions: Standard, deaf, fall     Orthopedic Precautions:LLE weight bearing as tolerated     Braces: N/A    Rehab Prognosis: Fair; patient would benefit from acute skilled PT services to address these deficits and reach maximum level of function.      History:     Past Medical History:   Diagnosis Date    Anticoagulant long-term use     Deaf     History of DVT (deep vein thrombosis)     Primary osteoarthritis of left knee        Past Surgical History:   Procedure Laterality Date    CHOLECYSTECTOMY      KNEE SURGERY      TOTAL KNEE ARTHROPLASTY Left 1/11/2024    Procedure: ARTHROPLASTY, KNEE, TOTAL;  Surgeon: Phoenix Godoy MD;  Location: [facility];  Service: Orthopedics;  Laterality: Left;       Subjective     Chief Complaint:  ""Hurt."     Respiratory Status: Room air    Patients cultural, spiritual, [employer]ology conflicts given the current situation: no      Objective:     Communicated with nurse and patient  prior to session.  Patient found up in chair with    upon PT entry to room.    Pt is Oriented x3 and Alert and Cooperative.    Vitals   Vitals at Rest  /71 mmHg    HR 97 bpm    O2 Sat 100 %   Pain 6/10      Vitals With Activity  Pain 5/10       Functional Mobility:   Transfers:     Sit to Stand: Set-up or clean-up assistance  with rolling walker  Bed to Chair: Set-up or clean-up assistance  with rolling walker  using  Step Transfer  Gait: Pt ambulates 10 feet and 41 feet  with RW with Supervision or touching assistance .   4 steps (6 inch)  stairs with bilateral handrails with Substantial/maximal assistance   2 inch  curb with rolling walker with Substantial/maximal assistance   Ambulate 10 feet on uneven surfaces/ramps with rolling walker with Substantial/maximal assistance    object from ground in standing position with reacher with rolling walker with Supervision or touching assistance      Current   Status  Discharge   Goal   Functional Area: Care Score:    Roll Left and Right 5 Set-up/clean-up   Sit to Lying 5 Set-up/clean-up   Lying to Sitting on Side of Bed 5 Set-up/clean-up   Sit to Stand 5 Set-up/clean-up   Chair/Bed-to-Chair Transfer 5 Set-up/clean-up   Car Transfer 4 Supervision or touching assistance   Walk 10 Feet 4 Supervision or touching assistance   Walk 50 Feet with Two Turns 88 Supervision or touching assistance   Walk 150 Feet 88 Supervision or touching assistance   Walk 10 Feet Uneven Surface 2 Supervision or touching assistance   1 Step (Curb) 2 Supervision or touching assistance   4 Steps 2 Supervision or touching assistance   12 Steps 9 Not applicable   Picking Up Object 4 Set-up/clean-up   Wheel 50 Feet with Two Turns 5 Independent   Wheel 150 Feet 5 Independent       Therapeutic Activities and " Exercises:  Gait training with RW x 2 trials  Stair negotiation with both side rails, non-reciprocal steps   Curb and ramp negotiation  Picking up small objects from the floor with a reacher    Activity Tolerance: Fair    Patient left up in chair with call button in reach, nurse  notified, and OT  present.    Education provided: roles and goals of PT/PTA, transfer training, gait training, stair training, balance training, safety awareness, assistive device, strengthening exercises, and fall prevention    Expected compliance: Moderate compliance and Low compliance    GOALS:   Multidisciplinary Problems       Physical Therapy Goals          Problem: Physical Therapy    Goal Priority Disciplines Outcome Goal Variances Interventions   Physical Therapy Goal     PT, PT/OT Adequate for Care Transition     Description:   Short Term Goals: 1/24/2024   Long Term Goals: 1/31/2024     Transfers Status    Sit to Stand  Short Term Goal: Complete sit to stand with Stand-by Assistance using Rolling Walker with minimal  verbal cues (MET 1/22/2024)  Long Term Goal:  Complete sit to stand with Supervision or Set-up Assistance using Rolling Walker (MET 1/22/2024)    Stand Step  Short Term Goal: Complete stand step with  Stand-by Assistance  using Rolling Walker with minimal   verbal cues (MET 1/22/2024)  Long Term Goal:  Complete stand step with  Supervision or Set-up Assistance  using Rolling Walker (MET 1/22/2024)    Supine <> Sit    Long Term Goal: Complete supine <> sit with Supervision or Set-up Assistance  (MET 1/22/2024)    Rolling Right/Left  Long Term Goal: Complete rolling  right/left with Supervision or Set-up Assistance (MET 1/22/2024)    Balance/Coordination    Static Sitting  Long Term Goal: Complete static sitting with Modified Independent (MET 1/22/2024)    Dynamic Sitting  Long Term Goal: Complete dynamic sitting with Supervision or Set-up Assistance  with  minimal  verbal cues minimal excursions (MET  2024)    Static Standing  Short Term Goal: Complete static standing with Stand-by Assistance with  minimal  verbal cues and RW (MET 2024)  Long Term Goal: Complete static standing with Supervision or Set-up Assistance  with   minimal   verbal cues and RW (MET 2024)    Dynamic Standing  Short Term Goal: Complete dynamic standing with Stand-by Assistance  with minimal   verbal cues minimal  excursions with RW (MET 2024)  Long Term Goal: Complete dynamic standing with Supervision or Set-up Assistance with minimal   verbal cues and minimal  excursions with RW (MET 2024)    Endurance    Short Term Goal:   Physical Therapy: 2 times a day, 30-45 minutes (MET 2024)    Rest periods: multiple (MET 2024)    Sitting: 3 hours/day (MET 2024)    Long Term Goal:  Physical Therapy: 2 times a day, 45 minutes (MET dated 2024)    Rest periods: minimal (MET dated 2024)      Sittin-8 hours/day (MET dated 2024)        Mobility/Gait  Short Term Goal: Will ambulate with Stand-by Assistance  Using Rolling Walker with minimal   verbal cues x 100 ft (NOT MET dated 2024)      Long Term Goal: Will ambulate with Stand-by Assistance  using Rolling Walker with minimal  verbal cues x 150 ft  Stairs Assistance(NOT MET dated 2024)    Long Term Goal: Patient will ascend/descend 4 stairs/steps using both  handrails  nonreciprocal steps with Minimal Assistance and minimal  verbal cues (NOT MET dated 2024)      Wheelchair Skills/Surface  Long Term Goal: Patient will propel Standard wheelchair x 300 feet with Level tileWITH Bilateral upper extremity with Modified Independent (MET 2024)      Ramp/Uneven 10 feet surface  Long  Term Goal; Patient will be able to navigate a 10 inch uneven surface with proper A.D. with  contact guard assistance/stand by assistance (NOT MET dated 2024)      2-6  inch curb  Long  Term Goal; Patient will be able to navigate a  2  inch curb with RW  with  minimal assistance (NOT MET dated 1/25/2024)      Picking up object   Long  Term Goal; Patient will be able to  a small object from the floor  with a RW . with  stand by assistance (MET dated 1/25/2023)     Car transfers  Long  Term Goal; Patient will be able to get in and out of a vehicle  with RW  with  minimal assistance (MET dated 1/25/2023)     Education  Long Term Goals:  Patient/family/caregivers trained on physical mobility and precautions with Supervision. (MET dated 1/25/2023)       Patient/family/caregivers trained on safety awareness with Supervision. (MET dated 1/25/2023)       Order and obtain all appropriate equipment.(MET dated 1/25/2023)                             Plan:     During this hospitalization, patient to be seen 5 x/week to address the identified rehab impairments via gait training, therapeutic activities, therapeutic exercises, neuromuscular re-education and progress toward the following goals:    Plan of Care Expires:  01/31/24  PT Next Visit Date: 01/25/24  Plan of Care reviewed with: patient    Additional Information:         Time Tracking:     Therapy Time  PT Received On: 01/25/24  PT Start Time: 0815  PT Stop Time: 0900  PT Total Time (min): 45 min   PT Individual: 45      Billable Minutes: Gait Training 30 and Therapeutic Activity 15    01/25/2024

## 2024-01-25 NOTE — PT/OT/SLP PROGRESS
0723-/Bedside shift change report given to Roseline (oncoming nurse) by Elva Shultz (offgoing nurse). Report included the following information SBAR, MAR and Recent Results. 0822-contacted  about the patient's persistent anxiety unrelieved by librium. 0841-administered one time dose of 1 mg Ativan PO per 's orders. 1919-/Bedside shift change report given to Elva Shultz (oncoming nurse) by Dwight Longoria (offgoing nurse). Report included the following information SBAR, MAR and Recent Results. Occupational Therapy Inpatient Rehab Treatment    Name: Lupe Santizo  MRN: [id number]    Assessment:  Lupe Santizo is a 43 y.o. female admitted with a medical diagnosis of Status post total left knee replacement.  She presents with the following impairments/functional limitations:  weakness, impaired endurance, impaired self care skills, impaired functional mobility, gait instability, impaired balance, visual deficits, impaired cognition, decreased coordination, decreased upper extremity function, decreased lower extremity function, decreased safety awareness, pain, decreased ROM, impaired fine motor, impaired skin, edema, impaired cardiopulmonary response to activity, impaired joint extensibility, impaired muscle length.    Pt demonstrated progress with functional short and long term goals as noted by changes in functional scores in which patient able to achieve 7/7 STG's and 6/8 LTG's. Pt now supervision to modified independence with ADL's including functional mobility with extra time, and use of adaptive equipment and assistive devices. All questions and concerns of patient and her caregiver on this date were addressed prior to conclusion of session.     General Precautions: Standard, deaf, fall     Orthopedic Precautions:LLE weight bearing as tolerated     Braces: N/A    Rehab Prognosis: Good; patient would benefit from acute skilled OT services to address these deficits and reach maximum level of function.      History:     Past Medical History:   Diagnosis Date    Anticoagulant long-term use     Deaf     History of DVT (deep vein thrombosis)     Primary osteoarthritis of left knee        Past Surgical History:   Procedure Laterality Date    CHOLECYSTECTOMY      KNEE SURGERY      TOTAL KNEE ARTHROPLASTY Left 1/11/2024    Procedure: ARTHROPLASTY, KNEE, TOTAL;  Surgeon: Phoenix Godoy MD;  Location: [facility];  Service: Orthopedics;  Laterality: Left;       Subjective     Orientation: Oriented x4    Chief  Complaint: weakness and pain     Patient/Family Comments/goals: Pt would like to improve her overall independence with ADL's including functional mobility.          Respiratory Status: Room air    Patients cultural, spiritual, Buddhist conflicts given the current situation: no       Objective:     Patient found up in chair with    upon OT entry to room.    Mobility   Patient completed:  Sit to Stand Transfer with setup assistance with rolling walker  Bed to Chair Transfer using Step Transfer technique with setup assistance with rolling walker  Toilet Transfer Step Transfer technique with setup assistance with  grab bars  Tub Transfer Scoot Pivot technique with supervision with grab bars and TTB.    ADLs   Current Status   Eating 6   Oral Hygiene 6   Shower, Bathe Self 5   Upper Body Dressing 5   Lower Body Dressing 4   Toileting Hygiene 5   Toilet Transfer 5   Putting On, Taking Off Footwear 4     Limiting Factors for ADLs: endurance, limited ROM, balance, weakness, body habitus, coordination, cognition, safety awareness, and pain     Therapeutic Exercise  Pt participated in home exercise program / therapeutic exercise performing 3x15 bilateral UE proximal strengthening exercises utilizing extra heavy resistance theraband with scapular retraction, shoulder extension, shoulder adduction, horizontal abduction, shoulder flexion in plane of scaption, internal rotation and external rotation requiring verbal and tactile cueing for technique while emphasizing quality of movement.        Additional Treatments: Pt and her sister (caregiver) participated in extensive patient/family training prior to discharge addressing current functional status with ADL's, use of assistive devices / adaptive equipment in home, home exercise program, and transfers to / from toilet and tub with additional ADL retraining as further noted above providing extra time with demonstration requiring tactile cueing for safety awareness and technique  with additional written instruction.        LifeStyle Change and Education:             Patient left up in chair with  nurse notified and PT and student present.     Education provided: Roles and goals of OT, ADLs, transfer training, bed mobility, body mechanics, assistive device, wheelchair precautions, safety precautions, fall prevention, equipment recommendations, and home safety    Short Term Goals to be met by: 01/23/24      Patient will increase functional independence with ADLs by performing:     UE Dressing with Supervision. MET  LE Dressing with Moderate Assistance. MET  Grooming while seated at sink with Set-up Assistance. MET  Toileting from toilet with Minimal Assistance for hygiene and clothing management. MET  Bathing from  shower chair/bench with Minimal Assistance. MET  Toilet transfer to toilet with Minimal Assistance. MET  Increased functional strength to 4/5 for bilateral UE's. MET      Long Term Goals to be met by: 01/30/24      Patient will increase functional independence with ADLs by performing:     Feeding with [place]. MET  UE Dressing with Modified [place]. NOT MET - setup assistance  LE Dressing with Supervision. MET  Grooming while seated at sink with Modified [place]. MET  Toileting from toilet with Set-up Assistance for hygiene and clothing management. MET  Bathing from  shower chair/bench with Set-up Assistance. MET  Toilet transfer to toilet with Set-up Assistance. MET  Increased functional strength to 4+/5 to 5/5 for bilateral UE's. NOT MET -  4+/5       Time Tracking     OT Received On: 01/25/24  Time In 0900     Time Out 1030  Total Time 90 min  Therapy Time: OT Individual: 60  OT Concurrent: 30  Missed Time:    Missed Time Reason:      Billable Minutes: Self Care/Home Management 60 and Therapeutic Exercise 30    01/25/2024

## 2024-01-25 NOTE — PT/OT/SLP PROGRESS
Physical Therapy Inpatient Rehab Treatment    Patient Name:  Lupe Santizo   MRN:  [id number]    Recommendations:     Discharge Recommendations:  Low Intensity Therapy   Discharge Equipment Recommendations: bath bench, bedside commode, walker, rolling   Barriers to discharge: None    Assessment:     Lupe Santizo is a 43 y.o. female admitted with a medical diagnosis of Status post total left knee replacement.  She presents with the following impairments/functional limitations:  weakness, pain, cognitive deficits, impaired coordination, impaired balance, gait deviations, difficulty with functional transfers, difficulty with bed mobility, difficulty with wheelchair propulsion, impaired sensation, impaired proprioception, decreased motor control, decreased safety, and decreased endurance. Patient has made appropriate functional gains since the start of her inpatient rehab stay and is appropriate for discharge to home. She has reached her PLOF and had a successful family training session today. Her caregiver verbalized understanding of the exercises and safety precautions that were discussed and the patient demonstrated an ability to negotiate steps to enter the home, negotiate the ramp at the front entrance of the hospital, walk on level surfaces, and transfer in and out of the car. Post rehab POC was discussed, including importance of follow ups with orthopedist and outpatient PT. Caregiver ensured that she would see to the patient's compliance with POC. L knee AROM 25-90 and PROM 20-95.    Rehab Diagnosis: L TKA    Recent Surgery: L TKA    General Precautions: Standard, deaf, fall     Orthopedic Precautions:LLE weight bearing as tolerated     Braces: N/A    Rehab Prognosis: Fair; patient would benefit from acute skilled PT services to address these deficits and reach maximum level of function.      History:     Past Medical History:   Diagnosis Date    Anticoagulant long-term use     Deaf     History of DVT (deep  "vein thrombosis)     Primary osteoarthritis of left knee        Past Surgical History:   Procedure Laterality Date    CHOLECYSTECTOMY      KNEE SURGERY      TOTAL KNEE ARTHROPLASTY Left 1/11/2024    Procedure: ARTHROPLASTY, KNEE, TOTAL;  Surgeon: Phoenix Godoy MD;  Location: [facility];  Service: Orthopedics;  Laterality: Left;       Subjective     Chief Complaint: "knee pain"    Respiratory Status: Room air    Patients cultural, spiritual, Rastafari conflicts given the current situation: no      Objective:     Communicated with nurse, OT, patient and caregiver prior to session.  Patient found up in chair with    upon PT entry to room.    Pt is Oriented x3 and Alert, Cooperative, and Motivated.      Functional Mobility:   Bed Mobility:     Rolling Left:  Set-up or clean-up assistance   Rolling Right: Set-up or clean-up assistance   Scooting: Set-up or clean-up assistance   Supine to Sit: Set-up or clean-up assistance   Sit to Supine: Set-up or clean-up assistance   Transfers:     Sit to Stand: Set-up or clean-up assistance  with rolling walker  Chair to mat: Set-up or clean-up assistance  with  rolling walker  using  Stand Pivot  Toilet Transfer: Set-up or clean-up assistance  with  rolling walker  using  Stand Pivot  Car Transfer: Set-up or clean-up assistance  with  rolling walker  using  Stand Pivot  Gait: Pt ambulates 15 ft and 25 ft with RW with Supervision or touching assistance .   Impairments contributing to gait deviations include decreased flexibility, pain, decreased ROM, and decreased strength.  4 (6 inch steps) stairs with bilateral handrails with substantial/ maximal assistance x 2 people  Ambulate 10 feet on uneven surfaces/ramps with rolling walker with Substantial/maximal assistance   Wheelchair Propulsion:  Pt propelled Standard wheelchair x 300+ feet on Level tile with  Bilateral upper extremity with Set-up or clean-up assistance .      Current   Status  Discharge   Goal   Functional Area: Care " Score:    Roll Left and Right 5 Set-up/clean-up   Sit to Lying 5 Set-up/clean-up   Lying to Sitting on Side of Bed 5 Set-up/clean-up   Sit to Stand 5 Set-up/clean-up   Chair/Bed-to-Chair Transfer 5 Set-up/clean-up   Car Transfer 5 Supervision or touching assistance   Walk 10 Feet 4 Supervision or touching assistance   Walk 50 Feet with Two Turns 88 Supervision or touching assistance   Walk 150 Feet 88 Supervision or touching assistance   Walk 10 Feet Uneven Surface 2 Supervision or touching assistance   1 Step (Curb) 2 Supervision or touching assistance   4 Steps 2 Supervision or touching assistance   12 Steps 9 Not applicable   Picking Up Object 4 Set-up/clean-up   Wheel 50 Feet with Two Turns 5 Independent   Wheel 150 Feet 5 Independent       Therapeutic Activities and Exercises:  Family / caregiver training   Wheelchair training   Gait training: level and uneven surfaces   Transfer training: sit to stand, car, bed to chair   Stair training: x 4 steps (6 inch) up and down    Therapeutic Exercise:   LAQ x 30 with  active assisted end range extension    Prone lying for gravity assisted knee extension hamstring stretching with soft tissue mobilization of L hamstring and overpressure    Activity Tolerance: Fair    Patient left up in chair with  nurse present.    Education provided: roles and goals of PT/PTA, transfer training, bed mob, gait training, stair training, balance training, safety awareness, body mechanics, assistive device, wheelchair management, strengthening exercises, and fall prevention    Expected compliance: Moderate compliance    GOALS:   Multidisciplinary Problems       Physical Therapy Goals          Problem: Physical Therapy    Goal Priority Disciplines Outcome Goal Variances Interventions   Physical Therapy Goal     PT, PT/OT Adequate for Care Transition     Description:   Short Term Goals: 1/24/2024   Long Term Goals: 1/31/2024     Transfers Status    Sit to Stand  Short Term Goal: Complete sit  to stand with Stand-by Assistance using Rolling Walker with minimal  verbal cues (MET 2024)  Long Term Goal:  Complete sit to stand with Supervision or Set-up Assistance using Rolling Walker (MET 2024)    Stand Step  Short Term Goal: Complete stand step with  Stand-by Assistance  using Rolling Walker with minimal   verbal cues (MET 2024)  Long Term Goal:  Complete stand step with  Supervision or Set-up Assistance  using Rolling Walker (MET 2024)    Supine <> Sit    Long Term Goal: Complete supine <> sit with Supervision or Set-up Assistance  (MET 2024)    Rolling Right/Left  Long Term Goal: Complete rolling  right/left with Supervision or Set-up Assistance (MET 2024)    Balance/Coordination    Static Sitting  Long Term Goal: Complete static sitting with Modified Independent (MET 2024)    Dynamic Sitting  Long Term Goal: Complete dynamic sitting with Supervision or Set-up Assistance  with  minimal  verbal cues minimal excursions (MET 2024)    Static Standing  Short Term Goal: Complete static standing with Stand-by Assistance with  minimal  verbal cues and RW (MET 2024)  Long Term Goal: Complete static standing with Supervision or Set-up Assistance  with   minimal   verbal cues and RW (MET 2024)    Dynamic Standing  Short Term Goal: Complete dynamic standing with Stand-by Assistance  with minimal   verbal cues minimal  excursions with RW (MET 2024)  Long Term Goal: Complete dynamic standing with Supervision or Set-up Assistance with minimal   verbal cues and minimal  excursions with RW (MET 2024)    Endurance    Short Term Goal:   Physical Therapy: 2 times a day, 30-45 minutes (MET 2024)    Rest periods: multiple (MET 2024)    Sitting: 3 hours/day (MET 2024)    Long Term Goal:  Physical Therapy: 2 times a day, 45 minutes (MET dated 2024)    Rest periods: minimal (MET dated 2024)      Sittin-8 hours/day (MET dated  1/25/2024)        Mobility/Gait  Short Term Goal: Will ambulate with Stand-by Assistance  Using Rolling Walker with minimal   verbal cues x 100 ft (NOT MET dated 1/25/2024)      Long Term Goal: Will ambulate with Stand-by Assistance  using Rolling Walker with minimal  verbal cues x 150 ft  Stairs Assistance(NOT MET dated 1/25/2024)    Long Term Goal: Patient will ascend/descend 4 stairs/steps using both  handrails  nonreciprocal steps with Minimal Assistance and minimal  verbal cues (NOT MET dated 1/25/2024)      Wheelchair Skills/Surface  Long Term Goal: Patient will propel Standard wheelchair x 300 feet with Level tileWITH Bilateral upper extremity with Modified Independent (MET 1/22/2024)      Ramp/Uneven 10 feet surface  Long  Term Goal; Patient will be able to navigate a 10 inch uneven surface with proper A.D. with  contact guard assistance/stand by assistance (NOT MET dated 1/25/2024)      2-6  inch curb  Long  Term Goal; Patient will be able to navigate a  2  inch curb with RW with  minimal assistance (NOT MET dated 1/25/2024)      Picking up object   Long  Term Goal; Patient will be able to  a small object from the floor  with a RW . with  stand by assistance (MET dated 1/25/2023)     Car transfers  Long  Term Goal; Patient will be able to get in and out of a vehicle  with RW  with  minimal assistance (MET dated 1/25/2023)     Education  Long Term Goals:  Patient/family/caregivers trained on physical mobility and precautions with Supervision. (MET dated 1/25/2023)       Patient/family/caregivers trained on safety awareness with Supervision. (MET dated 1/25/2023)       Order and obtain all appropriate equipment.(MET dated 1/25/2023)                             Plan:     During this hospitalization, patient to be seen 5 x/week to address the identified rehab impairments via gait training, therapeutic activities, therapeutic exercises, neuromuscular re-education and progress toward the following  goals:    Plan of Care Expires:  01/31/24  PT Next Visit Date: 01/25/24  Plan of Care reviewed with: patient, caregiver    Additional Information:     Caregiver present for family training session.    Time Tracking:     Therapy Time  PT Received On: 01/25/24  PT Start Time: 1030  PT Stop Time: 1115  PT Total Time (min): 45 min   PT Individual: 45      Billable Minutes: Gait Training 10, Therapeutic Activity 25, and Therapeutic Exercise 10    01/25/2024

## 2024-01-25 NOTE — PLAN OF CARE
Problem: Occupational Therapy  Goal: Occupational Therapy Goal  Description:  Long Term Goals to be met by: 01/30/24     Patient will increase functional independence with ADLs by performing:    Feeding with [place].  UE Dressing with Modified [place].  LE Dressing with Supervision.  Grooming while seated at sink with Modified [place].  Toileting from toilet with Set-up Assistance for hygiene and clothing management.   Bathing from  shower chair/bench with Set-up Assistance.  Toilet transfer to toilet with Set-up Assistance.  Increased functional strength to 4+/5 to 5/5 for bilateral UE's.    Outcome: Ongoing, Progressing

## 2024-01-25 NOTE — ASSESSMENT & PLAN NOTE
Chronic, controlled. Latest blood pressure and vitals reviewed-   BP Readings from Last 3 Encounters:   01/25/24 109/68   01/16/24 115/75   12/18/23 (!) 146/91      Temp:  [97.7 °F (36.5 °C)-97.9 °F (36.6 °C)]   Pulse:  [78-96]   Resp:  [16-18]   BP: (109-128)/(64-68)   SpO2:  [98 %-100 %] .   Home meds for hypertension were reviewed and noted below.     While in the hospital, will manage blood pressure as follows; Continue home antihypertensive regimen    Will utilize p.r.n. blood pressure medication only if patient's blood pressure greater than 140/90 and she develops symptoms such as worsening chest pain or shortness of breath.

## 2024-01-25 NOTE — PT/OT/SLP DISCHARGE
Physical Therapy Discharge Summary    Name: Lupe Santizo  MRN: [id number]   Principal Problem: Status post total left knee replacement     Patient Discharged from acute Physical Therapy on 1/26/2024.  Please refer to prior PT noted date on 1/25/2024 for functional status.     Assessment:     Goals partially met. Patient has nearly reached her PLOF according to the caregiver and family. She has demonstrated an ability to be able to safely enter and exit her home with the assistance of her family and caregiver and is appropriate for care in outpatient PT.Family training done with priamry caregiver, no DME recommended at this time. Patient has wheelchair and RW at home.    Objective:     GOALS:   Multidisciplinary Problems       Physical Therapy Goals          Problem: Physical Therapy    Goal Priority Disciplines Outcome Goal Variances Interventions   Physical Therapy Goal     PT, PT/OT Adequate for Care Transition     Description:   Short Term Goals: 1/24/2024   Long Term Goals: 1/31/2024     Transfers Status    Sit to Stand  Short Term Goal: Complete sit to stand with Stand-by Assistance using Rolling Walker with minimal  verbal cues (MET 1/22/2024)  Long Term Goal:  Complete sit to stand with Supervision or Set-up Assistance using Rolling Walker (MET 1/22/2024)    Stand Step  Short Term Goal: Complete stand step with  Stand-by Assistance  using Rolling Walker with minimal   verbal cues (MET 1/22/2024)  Long Term Goal:  Complete stand step with  Supervision or Set-up Assistance  using Rolling Walker (MET 1/22/2024)    Supine <> Sit    Long Term Goal: Complete supine <> sit with Supervision or Set-up Assistance  (MET 1/22/2024)    Rolling Right/Left  Long Term Goal: Complete rolling  right/left with Supervision or Set-up Assistance (MET 1/22/2024)    Balance/Coordination    Static Sitting  Long Term Goal: Complete static sitting with Modified Independent (MET 1/22/2024)    Dynamic Sitting  Long Term Goal: Complete  dynamic sitting with Supervision or Set-up Assistance  with  minimal  verbal cues minimal excursions (MET 2024)    Static Standing  Short Term Goal: Complete static standing with Stand-by Assistance with  minimal  verbal cues and RW (MET 2024)  Long Term Goal: Complete static standing with Supervision or Set-up Assistance  with   minimal   verbal cues and RW (MET 2024)    Dynamic Standing  Short Term Goal: Complete dynamic standing with Stand-by Assistance  with minimal   verbal cues minimal  excursions with RW (MET 2024)  Long Term Goal: Complete dynamic standing with Supervision or Set-up Assistance with minimal   verbal cues and minimal  excursions with RW (MET 2024)    Endurance    Short Term Goal:   Physical Therapy: 2 times a day, 30-45 minutes (MET 2024)    Rest periods: multiple (MET 2024)    Sitting: 3 hours/day (MET 2024)    Long Term Goal:  Physical Therapy: 2 times a day, 45 minutes (MET dated 2024)    Rest periods: minimal (MET dated 2024)      Sittin-8 hours/day (MET dated 2024)        Mobility/Gait  Short Term Goal: Will ambulate with Stand-by Assistance  Using Rolling Walker with minimal   verbal cues x 100 ft (NOT MET dated 2024)      Long Term Goal: Will ambulate with Stand-by Assistance  using Rolling Walker with minimal  verbal cues x 150 ft  Stairs Assistance(NOT MET dated 2024)    Long Term Goal: Patient will ascend/descend 4 stairs/steps using both  handrails  nonreciprocal steps with Minimal Assistance and minimal  verbal cues (NOT MET dated 2024)      Wheelchair Skills/Surface  Long Term Goal: Patient will propel Standard wheelchair x 300 feet with Level tileWITH Bilateral upper extremity with Modified Independent (MET 2024)      Ramp/Uneven 10 feet surface  Long  Term Goal; Patient will be able to navigate a 10 inch uneven surface with proper A.D. with  contact guard assistance/stand by assistance (NOT MET dated  1/25/2024)      2-6  inch curb  Long  Term Goal; Patient will be able to navigate a  2  inch curb with RW with  minimal assistance (NOT MET dated 1/25/2024)      Picking up object   Long  Term Goal; Patient will be able to  a small object from the floor  with a RW . with  stand by assistance (MET dated 1/25/2023)     Car transfers  Long  Term Goal; Patient will be able to get in and out of a vehicle  with RW  with  minimal assistance (MET dated 1/25/2023)     Education  Long Term Goals:  Patient/family/caregivers trained on physical mobility and precautions with Supervision. (MET dated 1/25/2023)       Patient/family/caregivers trained on safety awareness with Supervision. (MET dated 1/25/2023)       Order and obtain all appropriate equipment.(MET dated 1/25/2023)                             Reasons for Discontinuation of Therapy Services  Transfer to alternate level of care.      Plan:     Patient Discharged to: Outpatient Therapy Services.      1/25/2024

## 2024-01-25 NOTE — PLAN OF CARE
Problem: Physical Therapy  Goal: Physical Therapy Goal  Description:   Short Term Goals: 1/24/2024   Long Term Goals: 1/31/2024     Transfers Status    Sit to Stand  Short Term Goal: Complete sit to stand with Stand-by Assistance using Rolling Walker with minimal  verbal cues (MET 1/22/2024)  Long Term Goal:  Complete sit to stand with Supervision or Set-up Assistance using Rolling Walker (MET 1/22/2024)    Stand Step  Short Term Goal: Complete stand step with  Stand-by Assistance  using Rolling Walker with minimal   verbal cues (MET 1/22/2024)  Long Term Goal:  Complete stand step with  Supervision or Set-up Assistance  using Rolling Walker (MET 1/22/2024)    Supine <> Sit    Long Term Goal: Complete supine <> sit with Supervision or Set-up Assistance  (MET 1/22/2024)    Rolling Right/Left  Long Term Goal: Complete rolling  right/left with Supervision or Set-up Assistance (MET 1/22/2024)    Balance/Coordination    Static Sitting  Long Term Goal: Complete static sitting with Modified Independent (MET 1/22/2024)    Dynamic Sitting  Long Term Goal: Complete dynamic sitting with Supervision or Set-up Assistance  with  minimal  verbal cues minimal excursions (MET 1/22/2024)    Static Standing  Short Term Goal: Complete static standing with Stand-by Assistance with  minimal  verbal cues and RW (MET 1/22/2024)  Long Term Goal: Complete static standing with Supervision or Set-up Assistance  with   minimal   verbal cues and RW (MET 1/22/2024)    Dynamic Standing  Short Term Goal: Complete dynamic standing with Stand-by Assistance  with minimal   verbal cues minimal  excursions with RW (MET 1/22/2024)  Long Term Goal: Complete dynamic standing with Supervision or Set-up Assistance with minimal   verbal cues and minimal  excursions with RW (MET 1/22/2024)    Endurance    Short Term Goal:   Physical Therapy: 2 times a day, 30-45 minutes (MET 1/22/2024)    Rest periods: multiple (MET 1/22/2024)    Sitting: 3 hours/day (MET  2024)    Long Term Goal:  Physical Therapy: 2 times a day, 45 minutes (MET dated 2024)    Rest periods: minimal (MET dated 2024)      Sittin-8 hours/day (MET dated 2024)        Mobility/Gait  Short Term Goal: Will ambulate with Stand-by Assistance  Using Rolling Walker with minimal   verbal cues x 100 ft (NOT MET dated 2024)      Long Term Goal: Will ambulate with Stand-by Assistance  using Rolling Walker with minimal  verbal cues x 150 ft  Stairs Assistance(NOT MET dated 2024)    Long Term Goal: Patient will ascend/descend 4 stairs/steps using both  handrails  nonreciprocal steps with Minimal Assistance and minimal  verbal cues (NOT MET dated 2024)      Wheelchair Skills/Surface  Long Term Goal: Patient will propel Standard wheelchair x 300 feet with Level tileWITH Bilateral upper extremity with Modified Independent (MET 2024)      Ramp/Uneven 10 feet surface  Long  Term Goal; Patient will be able to navigate a 10 inch uneven surface with proper A.D. with  contact guard assistance/stand by assistance (NOT MET dated 2024)      2-6  inch curb  Long  Term Goal; Patient will be able to navigate a  2  inch curb with RW with  minimal assistance (NOT MET dated 2024)      Picking up object   Long  Term Goal; Patient will be able to  a small object from the floor  with a RW . with  stand by assistance (MET dated 2023)     Car transfers  Long  Term Goal; Patient will be able to get in and out of a vehicle  with RW  with  minimal assistance (MET dated 2023)     Education  Long Term Goals:  Patient/family/caregivers trained on physical mobility and precautions with Supervision. (MET dated 2023)       Patient/family/caregivers trained on safety awareness with Supervision. (MET dated 2023)       Order and obtain all appropriate equipment.(MET dated 2023)        Outcome:  Partial of the goals met, Adequate for Care Transition, recommend out  patient P.T. for follow up

## 2024-01-25 NOTE — PROGRESS NOTES
[facility] Medicine  Progress Note    Patient Name: Lupe Santizo  MRN: [id number]  Patient Class: IP- Rehab   Admission Date: 1/16/2024  Length of Stay: 9 days  Attending Physician: Ricardo Mckeon III, MD  Primary Care Provider: Sandra Duggan MD        Subjective:     Principal Problem:Status post total left knee replacement        HPI:  History of present illness:      Lupe Santizo is a 43 y.o. female who presents for evaluation of their left knee pain. Mother reports daughter's pain has been present for years. Patient is both unable to hear and unable to speak, therefore history is from mother who is also MPOA. Patient also has a learning disability. Mother reports that the knee is the primary source of pain for the patient. Pain is mostly located medially. She had an injection at her last visit which gave her temporarily relief. She is on Xarelto for a DVT following gallbladder surgery, which she will be on for life. Patient returns today for f/u of L knee OA. She has since obtained clearance form her PCP and cardiologist which shows that she is low to intermediate risk and is optimized for surgery and okay to hold Xarelto 3 days preop that she is a high risk for recurrent DVT. She would like to proceed with TKA.      Patient underwent a successful left total knee arthroplasty and postoperatively her recovery has been uneventful.  Her care team reached out to us because of her learning impairment and communication challenges and felt that it was safest for her to recover and an inpatient rehab setting to avoid further complications and morbidity.  I have seen and evaluated the patient this morning we are communicating through a tablet patient is smiling her mood seems to be good she is complaining of pain at the knee her surgical site seems clear her dressing does have some serosanguineous discharge.  Patient's chart has been reviewed and reconciled all medications  updated.     Pt. Requires acute inpatient rehab admission with 24-hour nursing and active physician oversight to monitor and manage acute medical comorbid conditions, labs, pain, and functional deficits. Patient/family will also require teaching and integration of improving functional skills into daily living. She will also require an individualized, interdisciplinary approach to her care, receiving PT, OT services 3 hours per day, 5 days per week. Required care cannot be provided at a lower level of care. Patient is anticipated to require approximately 10-14 days LOS with expected discharge home with mother and with      Impairment group (IGC):   Unilateral Kneee Replacements 08.61 Etiologic diagnosis/description:   M17.12: Osteoarthritis of left knee   Date of onset:  1/11/2024 Date of surgery:  1/11/2024   Allergies: Patient has no known allergies.   Comorbid condition: Deaf, hx of CVA, unable to speak, hx of DVT, OA   Medical/functional conditions requiring inpatient rehabilitation:      This patient requires medical management/24-hour nursing of complex co morbidities Deaf, hx of CVA, unable to speak, hx of DVT, OA, labs, medications (see medications list), pain, sleep hygiene, anticoagulation, nutrition, hydration, neurological,  and preventive healthcare.     This patient requires intense therapy and an integrated, interdisciplinary approach to address safety, impaired mobility, impaired ADLs (dressing, toileting, grooming, showering), judgment, and memory, communication, bowel/bladder problems,preventive healthcare, medication management, integration of functional skills into daily living, and home caregiver support and training.      Risk for medical/clinical complications:      This patient is at risk for the following complications: DVT/PE, pneumonia, malnutrition, worsening activity intolerance, complications from anticoagulation,  skin breakdown, inadequate sleep, recurring stroke, and constipation.         Overview/Hospital Course:  01/18 CP: Pt c/o pain/soreness after working with therapy. Also reports loose stools. Med orders placed. Pt is in good spirits and working hard with PT this am. Encouraged good therapy efforts. Unable to visualize surgical site today.  1/19 DL:  Patient doing well.  No acute events reported.  Labs and vital signs stable.  Patient doing well with therapy.  Encouraged to continue great therapy efforts.  1/21 [place] weekend crossover. No reported concerns with therapy efforts.   1/22 DL:  Patient doing well.  She is sitting in dining room and is awake, alert, oriented.  Vital signs stable.  Patient reports mild pain that is well controlled with current p.r.n. medications.  Surgical dressing to left knee dry and intact.  We have contacted patient's orthopedic surgeon who states patient has appointment 1/29.  Surgeon states he will remove dressing and staples at that appointment.  Patient continues to tolerate therapy well.  Encouraged to keep up great therapy efforts.  1/23 FM:  Patient seen and examined after team conference, patient's mood is good she is smiling patient has regained function and getting close to her baseline.  May discharge home before the weekend as she has improved more rapidly than expected.  1/24 DL:  Patient continues to do well.  No acute events reported.  Labs and vital signs stable.  Patient continues to be highly motivated and is working well with therapy.  Patient encouraged to continue great therapy efforts.  1/25:  Patient continues to do well.  She is sitting up in wheelchair in dining room and is awake, alert, oriented.  No acute events reported.  No acute distress noted.  Patient currently working with family and occupational therapist for home training.  Labs and vital signs stable.  Patient continues to work well with therapy.  Encouraged patient to keep up great therapy efforts.    Past Medical History:   Diagnosis Date    Anticoagulant long-term use      Deaf     History of DVT (deep vein thrombosis)     Primary osteoarthritis of left knee        Past Surgical History:   Procedure Laterality Date    CHOLECYSTECTOMY      KNEE SURGERY      TOTAL KNEE ARTHROPLASTY Left 1/11/2024    Procedure: ARTHROPLASTY, KNEE, TOTAL;  Surgeon: Phoenix Godoy MD;  Location: [facility];  Service: Orthopedics;  Laterality: Left;       Review of patient's allergies indicates:  No Known Allergies    Current Facility-Administered Medications on File Prior to Encounter   Medication    methylPREDNISolone acetate injection 40 mg     Current Outpatient Medications on File Prior to Encounter   Medication Sig    acetaminophen (TYLENOL) 500 MG tablet Take 2 tablets (1,000 mg total) by mouth every 8 (eight) hours as needed for Pain.    diclofenac (VOLTAREN) 75 MG EC tablet Take 1 tablet (75 mg total) by mouth 2 (two) times daily.    gabapentin (NEURONTIN) 300 MG capsule Take 1 capsule (300 mg total) by mouth 3 (three) times daily.    methocarbamoL (ROBAXIN) 750 MG Tab Take 1 tablet (750 mg total) by mouth 3 (three) times daily as needed (Pain not relieved with Tylenol, diclofenac, or gabapentin).    oxyCODONE (ROXICODONE) 5 MG immediate release tablet Take 1 tablet (5 mg total) by mouth every 4 (four) hours as needed for Pain (pain not relieved by all other medications).    rivaroxaban (XARELTO) 20 mg Tab Take 20 mg by mouth.    ziprasidone (GEODON) 40 MG Cap TAKE 1 CAPSULE BY MOUTH DAILY AT BEDTIME TAKE WITH FOOD     Family History    None       Tobacco Use    Smoking status: Never    Smokeless tobacco: Current   Substance and Sexual Activity    Alcohol use: Never    Drug use: Never    Sexual activity: Not Currently     Review of Systems   Unable to perform ROS: Patient nonverbal     Objective:     Vital Signs (Most Recent):  Temp: 97.7 °F (36.5 °C) (01/25/24 0448)  Pulse: 96 (01/25/24 0809)  Resp: 16 (01/25/24 0448)  BP: 109/68 (01/25/24 0809)  SpO2: 98 % (01/25/24 0448) Vital Signs (24h  Range):  Temp:  [97.7 °F (36.5 °C)-97.9 °F (36.6 °C)] 97.7 °F (36.5 °C)  Pulse:  [78-96] 96  Resp:  [16-18] 16  SpO2:  [98 %-100 %] 98 %  BP: (109-128)/(64-68) 109/68     Weight: 100.4 kg (221 lb 6.4 oz)  Body mass index is 38 kg/m².     Physical Exam  Constitutional:       General: She is awake. She is not in acute distress.     Appearance: Normal appearance. She is obese. She is not toxic-appearing.   HENT:      Head: Normocephalic and atraumatic.      Nose: Nose normal. No congestion or rhinorrhea.      Mouth/Throat:      Mouth: Mucous membranes are moist.      Pharynx: Oropharynx is clear. No oropharyngeal exudate or posterior oropharyngeal erythema.   Eyes:      General: No scleral icterus.        Right eye: No discharge.         Left eye: No discharge.      Extraocular Movements: Extraocular movements intact.   Neck:      Thyroid: No thyroid mass or thyromegaly.      Vascular: No carotid bruit.      Meningeal: Brudzinski's sign and Kernig's sign absent.   Cardiovascular:      Rate and Rhythm: Normal rate and regular rhythm.      Chest Wall: PMI is not displaced. No thrill.      Pulses: Normal pulses.      Heart sounds: Normal heart sounds. No murmur heard.     No friction rub. No gallop.   Pulmonary:      Effort: Pulmonary effort is normal. No tachypnea, accessory muscle usage, prolonged expiration or respiratory distress.      Breath sounds: Normal breath sounds. No stridor or decreased air movement. No wheezing, rhonchi or rales.   Chest:      Chest wall: No tenderness.   Abdominal:      General: Bowel sounds are normal. There is no distension.      Palpations: Abdomen is soft. There is no hepatomegaly, splenomegaly or mass.      Tenderness: There is no abdominal tenderness. There is no right CVA tenderness, left CVA tenderness, guarding or rebound.      Hernia: No hernia is present.   Musculoskeletal:         General: Tenderness present. No swelling or deformity.      Cervical back: Neck supple. No  rigidity. No muscular tenderness.      Right lower leg: No edema.      Left lower leg: Edema present.      Comments: Surgical site clear   Lymphadenopathy:      Cervical: No cervical adenopathy.   Skin:     General: Skin is warm.      Capillary Refill: Capillary refill takes less than 2 seconds.      Coloration: Skin is not cyanotic, jaundiced or pale.      Findings: No erythema, petechiae or rash.   Neurological:      Mental Status: She is alert. Mental status is at baseline.      Cranial Nerves: No cranial nerve deficit, dysarthria or facial asymmetry.      Motor: Weakness present. No tremor.   Psychiatric:         Mood and Affect: Mood is not anxious or depressed. Affect is not flat.         Speech: Speech is not rapid and pressured or slurred.         Behavior: Behavior is not agitated, aggressive or combative.         Thought Content: Thought content is not paranoid or delusional.         Cognition and Memory: Cognition is not impaired. Memory is not impaired.      Comments: + learning impairment, + deaf              Significant Imaging: I have reviewed all pertinent imaging results/findings within the past 24 hours.    Assessment/Plan:      * Status post total left knee replacement  Patient's pain seems to be fairly well controlled postoperatively agree with current regimen and adjust as needed.  PT/OT evaluations reviewed.  1/21 Left knee pain on scale of 6 out of 10, after pain meds, pain reduced to 2 out of 10.   1/22:  Patient reports 1/10 left knee pain that is well-controlled with p.r.n. medications.  Surgical dressing intact.  No drainage noted.  Patient has appointment with orthopedic surgeon on the 29th of this month.  Surgeon states he will remove dressing and staples at that time.  1/23 FM:  Doing well with txt, home Friday.  1/25:  Patient continues to do well.  Patient anxious for discharge home tomorrow.  We will need to keep follow up appointment with Orthopedic surgery scheduled 1/29.    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      Essential hypertension  Chronic, controlled. Latest blood pressure and vitals reviewed-   BP Readings from Last 3 Encounters:   01/25/24 109/68   01/16/24 115/75   12/18/23 (!) 146/91      Temp:  [97.7 °F (36.5 °C)-97.9 °F (36.6 °C)]   Pulse:  [78-96]   Resp:  [16-18]   BP: (109-128)/(64-68)   SpO2:  [98 %-100 %] .   Home meds for hypertension were reviewed and noted below.     While in the hospital, will manage blood pressure as follows; Continue home antihypertensive regimen    Will utilize p.r.n. blood pressure medication only if patient's blood pressure greater than 140/90 and she develops symptoms such as worsening chest pain or shortness of breath.    Bilateral deafness  Patient able to communicate with tablet and has some basic reading and writing skills.      Obesity (BMI 30-39.9)  Body mass index is 38 kg/m². Morbid obesity complicates all aspects of disease management from diagnostic modalities to treatment. Weight loss encouraged and health benefits explained to patient.         Primary osteoarthritis of left knee  Patient's pain seems to be fairly well controlled postoperatively agree with current regimen and adjust as needed.        VTE Risk Mitigation (From admission, onward)           Ordered     rivaroxaban tablet 20 mg  with dinner         01/16/24 1435     IP VTE LOW RISK PATIENT  Once         01/16/24 1435     Place sequential compression device  Until discontinued         01/16/24 1435                    Discharge Planning   FRED:      Code Status: Full Code   Is the patient medically ready for discharge?:     Reason for patient still in hospital (select all that apply): Patient trending condition and PT / OT recommendations  Discharge  Plan A: Rehab                  Leslie Guidry NP  Department of Hospital Medicine   [facility])

## 2024-01-26 VITALS
RESPIRATION RATE: 18 BRPM | DIASTOLIC BLOOD PRESSURE: 59 MMHG | TEMPERATURE: 98 F | HEIGHT: 64 IN | HEART RATE: 88 BPM | BODY MASS INDEX: 37.8 KG/M2 | OXYGEN SATURATION: 96 % | WEIGHT: 221.38 LBS | SYSTOLIC BLOOD PRESSURE: 107 MMHG

## 2024-01-26 DIAGNOSIS — Z96.652 STATUS POST TOTAL LEFT KNEE REPLACEMENT: Primary | ICD-10-CM

## 2024-01-26 PROCEDURE — 25000003 PHARM REV CODE 250

## 2024-01-26 PROCEDURE — 25000003 PHARM REV CODE 250: Performed by: STUDENT IN AN ORGANIZED HEALTH CARE EDUCATION/TRAINING PROGRAM

## 2024-01-26 PROCEDURE — 25000003 PHARM REV CODE 250: Performed by: INTERNAL MEDICINE

## 2024-01-26 RX ORDER — ACETAMINOPHEN 500 MG
1000 TABLET ORAL EVERY 8 HOURS PRN
Qty: 84 TABLET | Refills: 0 | Status: SHIPPED | OUTPATIENT
Start: 2024-01-26 | End: 2024-02-09

## 2024-01-26 RX ORDER — OXYCODONE HYDROCHLORIDE 5 MG/1
5 TABLET ORAL EVERY 8 HOURS PRN
Qty: 21 TABLET | Refills: 0 | Status: SHIPPED | OUTPATIENT
Start: 2024-01-26 | End: 2024-01-26 | Stop reason: SDUPTHER

## 2024-01-26 RX ORDER — POLYETHYLENE GLYCOL 3350 17 G/17G
17 POWDER, FOR SOLUTION ORAL DAILY
Qty: 30 PACKET | Refills: 0 | Status: SHIPPED | OUTPATIENT
Start: 2024-01-26 | End: 2024-06-17

## 2024-01-26 RX ORDER — OXYCODONE HYDROCHLORIDE 5 MG/1
5 TABLET ORAL EVERY 8 HOURS PRN
Qty: 21 TABLET | Refills: 0 | Status: SHIPPED | OUTPATIENT
Start: 2024-01-26 | End: 2024-02-02

## 2024-01-26 RX ORDER — FAMOTIDINE 20 MG/1
20 TABLET, FILM COATED ORAL 2 TIMES DAILY
Qty: 60 TABLET | Refills: 1 | Status: SHIPPED | OUTPATIENT
Start: 2024-01-26 | End: 2024-06-17

## 2024-01-26 RX ADMIN — SENNOSIDES AND DOCUSATE SODIUM 1 TABLET: 8.6; 5 TABLET ORAL at 08:01

## 2024-01-26 RX ADMIN — FAMOTIDINE 20 MG: 20 TABLET, FILM COATED ORAL at 08:01

## 2024-01-26 RX ADMIN — ACETAMINOPHEN 650 MG: 325 TABLET ORAL at 08:01

## 2024-01-26 RX ADMIN — POLYETHYLENE GLYCOL (3350) 17 G: 17 POWDER, FOR SOLUTION ORAL at 08:01

## 2024-01-26 RX ADMIN — GABAPENTIN 300 MG: 300 CAPSULE ORAL at 08:01

## 2024-01-26 NOTE — PT/OT/SLP DISCHARGE
Occupational Therapy Discharge Summary    Lupe Santizo  MRN: [id number]   Principal Problem: Status post total left knee replacement      Patient Discharged from inpatient Occupational Therapy on 01/26/24.  Please refer to prior OT note dated 01/25/24 for functional status.    Assessment:     Pt was cooperative and motivated with verbal encouragement while exhibiting positive affect during OT treatment sessions including ADL training, Functional mobility training, Strengthening exercises, Energy conservation techniques, Endurance training, Coordination training, Balance training, Self care, Home management, Transfer training, Therapeutic exercise, Therapeutic activity, Cognitive training, Wheelchair mobility training, Community reintegration, and Patient/family education allowing her to progress with functional goals in which she achieved 7/7 STG's and 6/8 LTG's. Pt now supervision to modified independence with ADL's including functional mobility with extra time, and use of adaptive equipment and assistive devices.     Objective:     GOALS:   Short Term Goals to be met by: 01/23/24      Patient will increase functional independence with ADLs by performing:     UE Dressing with Supervision. MET  LE Dressing with Moderate Assistance. MET  Grooming while seated at sink with Set-up Assistance. MET  Toileting from toilet with Minimal Assistance for hygiene and clothing management. MET  Bathing from  shower chair/bench with Minimal Assistance. MET  Toilet transfer to toilet with Minimal Assistance. MET  Increased functional strength to 4/5 for bilateral UE's. MET      Long Term Goals to be met by: 01/30/24      Patient will increase functional independence with ADLs by performing:     Feeding with Zenia. MET  UE Dressing with Modified Zenia. NOT MET - setup assistance  LE Dressing with Supervision. MET  Grooming while seated at sink with Modified Zenia. MET  Toileting from toilet with Set-up  Assistance for hygiene and clothing management. MET  Bathing from  shower chair/bench with Set-up Assistance. MET  Toilet transfer to toilet with Set-up Assistance. MET  Increased functional strength to 4+/5 to 5/5 for bilateral UE's. NOT MET -  4+/5       Reasons for Discontinuation of Therapy Services  Satisfactory goal achievement.      Plan:     Patient Discharged to: Outpatient Therapy Services    1/26/2024

## 2024-01-26 NOTE — ASSESSMENT & PLAN NOTE
Chronic, controlled. Latest blood pressure and vitals reviewed-   BP Readings from Last 3 Encounters:   01/26/24 (!) 107/59   01/16/24 115/75   12/18/23 (!) 146/91      Temp:  [98.2 °F (36.8 °C)-98.8 °F (37.1 °C)]   Pulse:  [88-92]   Resp:  [18]   BP: (107-114)/(53-59)   SpO2:  [96 %-98 %] .   Home meds for hypertension were reviewed and noted below.     While in the hospital, will manage blood pressure as follows; Continue home antihypertensive regimen    Will utilize p.r.n. blood pressure medication only if patient's blood pressure greater than 140/90 and she develops symptoms such as worsening chest pain or shortness of breath.   show

## 2024-01-26 NOTE — NURSING
Patient dc home today with family.  I went over DC paperwork with patient 's family with family voicing understanding. All patient's belongings were packed and sent home with patient. Patient was brought downstairs via wc with  hospital staff and family.

## 2024-01-26 NOTE — DISCHARGE INSTRUCTIONS
-Take all medication as instructed by your doctor.  -If symptoms return or worsen call your doctor or go to nearest ER.  -Follow all doctor orders and go to scheduled appointments.  -Thanks for choosing [facility] with your care!!!

## 2024-01-26 NOTE — DISCHARGE SUMMARY
-- DO NOT REPLY / DO NOT REPLY ALL --  -- Message is from the [facility]--    COVID-19 Universal Screening: Negative    General Patient Message      Reason for Call:  Patient would like for you to get from [facility] emergency medical record room fax cover sheet with name and date of birth, [phone] mrn for 10.07./ and for 10.08 / diagnotic and all lab test completed, lab test in progress, and imaging test, and medication given, fax [phone], fax all information to this fax number serene viera Want to know if you have received the medical from [facility], please give him a call back    Caller Information       Type Contact Phone    10/12/2020 10:36 AM CDT Phone (Incoming) Bhavesh Quiroga (Self) [phone] (M)          Alternative phone number:   [phone]    Turnaround time given to caller:   \"This message will be sent to [state Provider's name]. The clinical team will fulfill your request as soon as they review your message.\"     [facility] Medicine  Discharge Summary      Patient Name: Lupe Santizo  MRN: [id number]  [facility]: [id number]  Patient Class: IP- Rehab  Admission Date: 1/16/2024  Hospital Length of Stay: 10 days  Discharge Date and Time:  01/26/2024 8:42 AM  Attending Physician: Ricardo Mckeon III, MD   Discharging Provider: Ricardo Mckeon III, MD  Primary Care Provider: Sandra Duggan MD    Primary Care Team: Networked reference to record PCT     HPI:   History of present illness:      Lupe Santizo is a 43 y.o. female who presents for evaluation of their left knee pain. Mother reports daughter's pain has been present for years. Patient is both unable to hear and unable to speak, therefore history is from mother who is also MPOA. Patient also has a learning disability. Mother reports that the knee is the primary source of pain for the patient. Pain is mostly located medially. She had an injection at her last visit which gave her temporarily relief. She is on Xarelto for a DVT following gallbladder surgery, which she will be on for life. Patient returns today for f/u of L knee OA. She has since obtained clearance form her PCP and cardiologist which shows that she is low to intermediate risk and is optimized for surgery and okay to hold Xarelto 3 days preop that she is a high risk for recurrent DVT. She would like to proceed with TKA.      Patient underwent a successful left total knee arthroplasty and postoperatively her recovery has been uneventful.  Her care team reached out to us because of her learning impairment and communication challenges and felt that it was safest for her to recover and an inpatient rehab setting to avoid further complications and morbidity.  I have seen and evaluated the patient this morning we are communicating through a tablet patient is smiling her mood seems to be good she is complaining of pain at the knee her surgical site seems clear her dressing does have some  serosanguineous discharge.  Patient's chart has been reviewed and reconciled all medications updated.     Pt. Requires acute inpatient rehab admission with 24-hour nursing and active physician oversight to monitor and manage acute medical comorbid conditions, labs, pain, and functional deficits. Patient/family will also require teaching and integration of improving functional skills into daily living. She will also require an individualized, interdisciplinary approach to her care, receiving PT, OT services 3 hours per day, 5 days per week. Required care cannot be provided at a lower level of care. Patient is anticipated to require approximately 10-14 days LOS with expected discharge home with mother and with      Impairment group (IGC):   Unilateral Kneee Replacements 08.61 Etiologic diagnosis/description:   M17.12: Osteoarthritis of left knee   Date of onset:  1/11/2024 Date of surgery:  1/11/2024   Allergies: Patient has no known allergies.   Comorbid condition: Deaf, hx of CVA, unable to speak, hx of DVT, OA   Medical/functional conditions requiring inpatient rehabilitation:      This patient requires medical management/24-hour nursing of complex co morbidities Deaf, hx of CVA, unable to speak, hx of DVT, OA, labs, medications (see medications list), pain, sleep hygiene, anticoagulation, nutrition, hydration, neurological,  and preventive healthcare.     This patient requires intense therapy and an integrated, interdisciplinary approach to address safety, impaired mobility, impaired ADLs (dressing, toileting, grooming, showering), judgment, and memory, communication, bowel/bladder problems,preventive healthcare, medication management, integration of functional skills into daily living, and home caregiver support and training.      Risk for medical/clinical complications:      This patient is at risk for the following complications: DVT/PE, pneumonia, malnutrition, worsening activity intolerance, complications  from anticoagulation,  skin breakdown, inadequate sleep, recurring stroke, and constipation.        * No surgery found *      Hospital Course:   01/18 CP: Pt c/o pain/soreness after working with therapy. Also reports loose stools. Med orders placed. Pt is in good spirits and working hard with PT this am. Encouraged good therapy efforts. Unable to visualize surgical site today.  1/19 DL:  Patient doing well.  No acute events reported.  Labs and vital signs stable.  Patient doing well with therapy.  Encouraged to continue great therapy efforts.  1/21 [place] weekend crossover. No reported concerns with therapy efforts.   1/22 DL:  Patient doing well.  She is sitting in dining room and is awake, alert, oriented.  Vital signs stable.  Patient reports mild pain that is well controlled with current p.r.n. medications.  Surgical dressing to left knee dry and intact.  We have contacted patient's orthopedic surgeon who states patient has appointment 1/29.  Surgeon states he will remove dressing and staples at that appointment.  Patient continues to tolerate therapy well.  Encouraged to keep up great therapy efforts.  1/23 FM:  Patient seen and examined after team conference, patient's mood is good she is smiling patient has regained function and getting close to her baseline.  May discharge home before the weekend as she has improved more rapidly than expected.  1/24 DL:  Patient continues to do well.  No acute events reported.  Labs and vital signs stable.  Patient continues to be highly motivated and is working well with therapy.  Patient encouraged to continue great therapy efforts.  1/25:  Patient continues to do well.  She is sitting up in wheelchair in dining room and is awake, alert, oriented.  No acute events reported.  No acute distress noted.  Patient currently working with family and occupational therapist for home training.  Labs and vital signs stable.  Patient continues to work well with therapy.  Encouraged patient  to keep up great therapy efforts.    Discharge Note:  Patient transitioned well into the inpatient rehab unit and was very comfortable with our staff and rehabilitative care.  Patient has a learning and communication barrier but was able to put in good effort and make functional gains.  Patient's dressing was left intact for her surgeon to remove patient participated and made her daily hours in therapy and overall improved with our team functionally.  Patient's safety strength range of motion all improved and there were no significant complications during this stay.     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      ENT  Bilateral deafness  Patient able to communicate with tablet and has some basic reading and writing skills.      Cardiac/Vascular  Essential hypertension  Chronic, controlled. Latest blood pressure and vitals reviewed-   BP Readings from Last 3 Encounters:   01/26/24 (!) 107/59   01/16/24 115/75   12/18/23 (!) 146/91      Temp:  [98.2 °F (36.8 °C)-98.8 °F (37.1 °C)]   Pulse:  [88-92]   Resp:  [18]   BP: (107-114)/(53-59)   SpO2:  [96 %-98 %] .   Home meds for hypertension were reviewed and noted below.     While in the hospital, will manage blood pressure as follows; Continue home antihypertensive regimen    Will utilize p.r.n. blood pressure medication only if patient's blood pressure greater than 140/90 and she develops symptoms such as worsening chest pain or shortness of breath.    Hematology  On deep vein thrombosis (DVT) prophylaxis  Xarelto       History of DVT (deep vein thrombosis)  Xarelto  ordered    Will need 6Months of treatment per primary team.    Endocrine  Obesity (BMI 30-39.9)  Body mass index is 38 kg/m². Morbid obesity complicates all aspects of disease management from diagnostic modalities to treatment. Weight loss encouraged and health benefits explained to patient.         Orthopedic  * Status post total left knee replacement  Patient's pain seems to be fairly well controlled postoperatively agree with current regimen and adjust as needed.  PT/OT evaluations reviewed.  1/21 Left knee pain on scale of 6 out of 10, after pain meds, pain reduced to 2 out of 10.   1/22:  Patient reports 1/10 left knee pain that is well-controlled with p.r.n. medications.  Surgical dressing intact.  No drainage noted.  Patient has appointment with orthopedic surgeon on the 29th of this month.  Surgeon states he will remove dressing and staples at that time.  1/23 FM:  Doing well with txt, home Friday.  1/25:  Patient continues to do well.  Patient anxious for discharge home tomorrow.  We will need to keep follow up appointment with Orthopedic surgery scheduled 1/29.    Primary osteoarthritis of left knee  Patient's pain seems to be fairly well controlled postoperatively agree with current regimen and adjust as needed.        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    Specialty: Internal Medicine  Why: Follow up with Dr. Sandra Duggan on 2/1/2024 @ 9:30 a.m.  Contact information:  [address] [place] [address]  [phone]               Phoenix Godoy MD Follow up on 1/29/2024.    Specialty: Orthopedic Surgery  Why: Folllow up w/Dr. Godoy on Monday 1/29/2024 @ 1:40 p.m.  Contact information:  [address]  [place] [address]  [phone]               Rehab, [facility] Out[facility] Follow up on 1/29/2024.    Specialties: Physical Therapy, Occupational Therapy, Speech Pathology  Why: Therapy scheduled for 1/29/2024 @ 11:00 a.m.  Contact information:  [address]  [place] [address]  [phone]                           Patient Instructions:      WALKER FOR HOME USE     Order Specific Question Answer Comments   Type of Walker: Adult (5'4"-6'6")    With wheels? Yes    Height: 5' 4" (1.626 m)    Weight: 100.4 kg (221 lb 6.4 oz)    Length of need (1-99 months): 99    Does patient have medical equipment at home? wheelchair    Does patient have medical equipment at home? rollator    Please check all that apply: Patient's condition impairs ambulation.    Please check all that apply: Patient is unable to safely ambulate without equipment.    Please check all that apply: Walker will be used for gait training.      COMMODE FOR HOME USE     Order Specific Question Answer Comments   Type: Standard    Height: 5' 4" (1.626 m)    Weight: 100.4 kg (221 lb 6.4 oz)    Does patient have medical equipment at home? wheelchair    Does patient have medical equipment at home? rollator    Length of need (1-99 months): 99      Ambulatory referral/consult to Physical/Occupational Therapy   Standing Status: Future   Referral Priority: Routine Referral Type: Physical Medicine   Referral Reason: Specialty Services Required   Referred to Provider: [facility] OUT[facility]IENT " REHAB   Number of Visits Requested: 1     Diet Cardiac     Activity as tolerated     Weight bearing restrictions (specify):   Order Comments: WBAT       Significant Diagnostic Studies: N/A    Pending Diagnostic Studies:       None           Medications:  Reconciled Home Medications:      Medication List        START taking these medications      famotidine 20 MG tablet  Commonly known as: PEPCID  Take 1 tablet (20 mg total) by mouth 2 (two) times daily.     polyethylene glycol 17 gram Pwpk  Commonly known as: GLYCOLAX  Take 17 g by mouth once daily.            CHANGE how you take these medications      oxyCODONE 5 MG immediate release tablet  Commonly known as: ROXICODONE  Take 1 tablet (5 mg total) by mouth every 8 (eight) hours as needed for Pain (pain not relieved by all other medications).  What changed: when to take this     rivaroxaban 20 mg Tab  Commonly known as: XARELTO  Take 1 tablet (20 mg total) by mouth Daily.  What changed: when to take this            CONTINUE taking these medications      acetaminophen 500 MG tablet  Commonly known as: TYLENOL  Take 2 tablets (1,000 mg total) by mouth every 8 (eight) hours as needed for Pain.     diclofenac 75 MG EC tablet  Commonly known as: VOLTAREN  Take 1 tablet (75 mg total) by mouth 2 (two) times daily.     gabapentin 300 MG capsule  Commonly known as: NEURONTIN  Take 1 capsule (300 mg total) by mouth 3 (three) times daily.     methocarbamoL 750 MG Tab  Commonly known as: ROBAXIN  Take 1 tablet (750 mg total) by mouth 3 (three) times daily as needed (Pain not relieved with Tylenol, diclofenac, or gabapentin).     ziprasidone 40 MG Cap  Commonly known as: GEODON  TAKE 1 CAPSULE BY MOUTH DAILY AT BEDTIME TAKE WITH FOOD              Indwelling Lines/Drains at time of discharge:   Lines/Drains/Airways       None                   Time spent on the discharge of patient: 43 minutes         Ricardo Mckeon III, MD  Department of Hospital Medicine  [facility]  ([facility])

## 2024-01-29 NOTE — PLAN OF CARE
[facility] - Rehab (Hospital)  Discharge Final Note    Primary Care Provider: Sandra Duggan MD    Expected Discharge Date: 1/26/2024    Final Discharge Note (most recent)       Final Note - 01/29/24 0710          Final Note    Assessment Type Final Discharge Note     Anticipated Discharge Disposition Rehab Facility     What phone number can be called within the next 1-3 days to see how you are doing after discharge? [phone]     Hospital Resources/Appts/Education Provided Appointments scheduled and added to AVS        Post-Acute Status    Post-Acute Authorization HME;Placement     Post-Acute Placement Status Set-up Complete/Auth obtained     HME Status Set-up Complete/Auth obtained     Discharge Delays None known at this time                     Important Message from Medicare             Contact Info       Sandra Duggan MD   Specialty: Internal Medicine   Relationship: PCP - General    [address]  [place] [address]   Phone: [phone]       Next Steps: Follow up on 2/1/2024    Instructions: Follow up with Dr. Sandra Duggan on 2/1/2024 @ 9:30 a.mPhoenix Wang MD   Specialty: Orthopedic Surgery    [address]  [place] [address]   Phone: [phone]       Next Steps: Follow up on 1/29/2024    Instructions: Quang stoddard w/Dr. Godoy on Monday 1/29/2024 @ 1:40 p.m.    [place] [facility]   Specialty: Physical Therapy, Occupational Therapy, Speech Pathology    [address]  [place] [address]   Phone: [phone]       Next Steps: Follow up on 1/29/2024    Instructions: Therapy scheduled for 1/29/2024 @ 11:00 a.m.

## 2024-04-17 RX ORDER — FAMOTIDINE 20 MG/1
20 TABLET, FILM COATED ORAL 2 TIMES DAILY
Qty: 60 TABLET | Refills: 1 | OUTPATIENT
Start: 2024-04-17

## 2024-04-17 RX ORDER — RIVAROXABAN 20 MG/1
20 TABLET, FILM COATED ORAL
Qty: 30 TABLET | Refills: 1 | OUTPATIENT
Start: 2024-04-17

## 2024-06-17 DIAGNOSIS — S78.119A UNILATERAL AKA: Primary | ICD-10-CM

## 2024-06-17 RX ORDER — FAMOTIDINE 20 MG/1
20 TABLET, FILM COATED ORAL 2 TIMES DAILY
Qty: 60 TABLET | Refills: 1 | Status: SHIPPED | OUTPATIENT
Start: 2024-06-17

## 2024-06-17 RX ORDER — CHLORPHENIRAMIN/PSEUDOEPHED/DM 1-15-5MG/5
LIQUID (ML) ORAL
Qty: 510 G | Refills: 0 | Status: SHIPPED | OUTPATIENT
Start: 2024-06-17

## 2024-07-31 DIAGNOSIS — S78.119A UNILATERAL AKA: ICD-10-CM

## 2024-07-31 RX ORDER — FAMOTIDINE 20 MG/1
20 TABLET, FILM COATED ORAL 2 TIMES DAILY
Qty: 60 TABLET | Refills: 1 | OUTPATIENT
Start: 2024-07-31

## 2024-09-16 DIAGNOSIS — S78.119A UNILATERAL AKA: ICD-10-CM

## 2024-09-16 RX ORDER — FAMOTIDINE 20 MG/1
20 TABLET, FILM COATED ORAL 2 TIMES DAILY
Qty: 60 TABLET | Refills: 1 | Status: SHIPPED | OUTPATIENT
Start: 2024-09-16

## 2024-12-09 DIAGNOSIS — S78.119A UNILATERAL AKA: ICD-10-CM

## 2024-12-09 RX ORDER — FAMOTIDINE 20 MG/1
20 TABLET, FILM COATED ORAL 2 TIMES DAILY
Qty: 60 TABLET | Refills: 1 | OUTPATIENT
Start: 2024-12-09

## 2025-01-29 DIAGNOSIS — S78.119A UNILATERAL AKA: ICD-10-CM

## 2025-01-29 RX ORDER — CHLORPHENIRAMIN/PSEUDOEPHED/DM 1-15-5MG/5
LIQUID (ML) ORAL
Qty: 510 G | Refills: 0 | OUTPATIENT
Start: 2025-01-29

## 2025-05-12 PROBLEM — M20.11 ACQUIRED HALLUX VALGUS OF RIGHT FOOT: Status: ACTIVE | Noted: 2025-05-12

## 2025-09-02 DIAGNOSIS — S78.119A UNILATERAL AKA: ICD-10-CM

## 2025-09-02 RX ORDER — FAMOTIDINE 20 MG/1
20 TABLET, FILM COATED ORAL 2 TIMES DAILY
Qty: 60 TABLET | Refills: 1 | OUTPATIENT
Start: 2025-09-02